# Patient Record
Sex: FEMALE | Race: WHITE | HISPANIC OR LATINO | Employment: FULL TIME | URBAN - METROPOLITAN AREA
[De-identification: names, ages, dates, MRNs, and addresses within clinical notes are randomized per-mention and may not be internally consistent; named-entity substitution may affect disease eponyms.]

---

## 2017-01-03 ENCOUNTER — ALLSCRIPTS OFFICE VISIT (OUTPATIENT)
Dept: OTHER | Facility: OTHER | Age: 42
End: 2017-01-03

## 2017-01-03 ENCOUNTER — LAB REQUISITION (OUTPATIENT)
Dept: LAB | Facility: HOSPITAL | Age: 42
End: 2017-01-03
Payer: COMMERCIAL

## 2017-01-03 DIAGNOSIS — Z34.93 ENCOUNTER FOR SUPERVISION OF NORMAL PREGNANCY IN THIRD TRIMESTER: ICD-10-CM

## 2017-01-03 DIAGNOSIS — O09.523 SUPERVISION OF ELDERLY MULTIGRAVIDA IN THIRD TRIMESTER: ICD-10-CM

## 2017-01-03 LAB
GLUCOSE (HISTORICAL): NEGATIVE
HGB UR QL STRIP.AUTO: NORMAL
LEUKOCYTE ESTERASE UR QL STRIP: NORMAL
PROT UR STRIP-MCNC: NEGATIVE MG/DL

## 2017-01-03 PROCEDURE — 87653 STREP B DNA AMP PROBE: CPT | Performed by: OBSTETRICS & GYNECOLOGY

## 2017-01-05 LAB — GP B STREP DNA SPEC QL NAA+PROBE: ABNORMAL

## 2017-01-11 ENCOUNTER — ALLSCRIPTS OFFICE VISIT (OUTPATIENT)
Dept: PERINATAL CARE | Facility: CLINIC | Age: 42
End: 2017-01-11
Payer: COMMERCIAL

## 2017-01-11 ENCOUNTER — GENERIC CONVERSION - ENCOUNTER (OUTPATIENT)
Dept: OTHER | Facility: OTHER | Age: 42
End: 2017-01-11

## 2017-01-11 ENCOUNTER — ALLSCRIPTS OFFICE VISIT (OUTPATIENT)
Dept: OTHER | Facility: OTHER | Age: 42
End: 2017-01-11

## 2017-01-11 LAB
GLUCOSE (HISTORICAL): NORMAL
HGB UR QL STRIP.AUTO: NORMAL
KETONES UR STRIP-MCNC: NORMAL MG/DL
LEUKOCYTE ESTERASE UR QL STRIP: NORMAL
NITRITE UR QL STRIP: NORMAL

## 2017-01-11 PROCEDURE — 59025 FETAL NON-STRESS TEST: CPT | Performed by: OBSTETRICS & GYNECOLOGY

## 2017-01-11 PROCEDURE — 76815 OB US LIMITED FETUS(S): CPT | Performed by: OBSTETRICS & GYNECOLOGY

## 2017-01-18 ENCOUNTER — ALLSCRIPTS OFFICE VISIT (OUTPATIENT)
Dept: OTHER | Facility: OTHER | Age: 42
End: 2017-01-18

## 2017-01-18 ENCOUNTER — ALLSCRIPTS OFFICE VISIT (OUTPATIENT)
Dept: PERINATAL CARE | Facility: CLINIC | Age: 42
End: 2017-01-18
Payer: COMMERCIAL

## 2017-01-18 ENCOUNTER — GENERIC CONVERSION - ENCOUNTER (OUTPATIENT)
Dept: OTHER | Facility: OTHER | Age: 42
End: 2017-01-18

## 2017-01-18 LAB
GLUCOSE (HISTORICAL): NORMAL
HGB UR QL STRIP.AUTO: NORMAL
LEUKOCYTE ESTERASE UR QL STRIP: NORMAL
NITRITE UR QL STRIP: NORMAL
PROT UR STRIP-MCNC: NORMAL MG/DL

## 2017-01-18 PROCEDURE — 76815 OB US LIMITED FETUS(S): CPT | Performed by: OBSTETRICS & GYNECOLOGY

## 2017-01-18 PROCEDURE — 59025 FETAL NON-STRESS TEST: CPT | Performed by: OBSTETRICS & GYNECOLOGY

## 2017-01-25 ENCOUNTER — ALLSCRIPTS OFFICE VISIT (OUTPATIENT)
Dept: PERINATAL CARE | Facility: CLINIC | Age: 42
End: 2017-01-25
Payer: COMMERCIAL

## 2017-01-25 ENCOUNTER — GENERIC CONVERSION - ENCOUNTER (OUTPATIENT)
Dept: OTHER | Facility: OTHER | Age: 42
End: 2017-01-25

## 2017-01-25 PROCEDURE — 76820 UMBILICAL ARTERY ECHO: CPT | Performed by: OBSTETRICS & GYNECOLOGY

## 2017-01-25 PROCEDURE — 76818 FETAL BIOPHYS PROFILE W/NST: CPT | Performed by: OBSTETRICS & GYNECOLOGY

## 2017-01-25 PROCEDURE — 76821 MIDDLE CEREBRAL ARTERY ECHO: CPT | Performed by: OBSTETRICS & GYNECOLOGY

## 2017-02-01 ENCOUNTER — GENERIC CONVERSION - ENCOUNTER (OUTPATIENT)
Dept: OTHER | Facility: OTHER | Age: 42
End: 2017-02-01

## 2017-02-01 ENCOUNTER — ALLSCRIPTS OFFICE VISIT (OUTPATIENT)
Dept: PERINATAL CARE | Facility: CLINIC | Age: 42
End: 2017-02-01
Payer: COMMERCIAL

## 2017-02-01 ENCOUNTER — APPOINTMENT (OUTPATIENT)
Dept: PERINATAL CARE | Facility: CLINIC | Age: 42
End: 2017-02-01
Payer: COMMERCIAL

## 2017-02-01 ENCOUNTER — ALLSCRIPTS OFFICE VISIT (OUTPATIENT)
Dept: OTHER | Facility: OTHER | Age: 42
End: 2017-02-01

## 2017-02-01 PROCEDURE — 76815 OB US LIMITED FETUS(S): CPT | Performed by: OBSTETRICS & GYNECOLOGY

## 2017-02-01 PROCEDURE — 59025 FETAL NON-STRESS TEST: CPT | Performed by: OBSTETRICS & GYNECOLOGY

## 2017-02-03 ENCOUNTER — HOSPITAL ENCOUNTER (OUTPATIENT)
Facility: HOSPITAL | Age: 42
Discharge: HOME | End: 2017-02-03
Attending: OBSTETRICS & GYNECOLOGY | Admitting: OBSTETRICS & GYNECOLOGY
Payer: COMMERCIAL

## 2017-02-03 VITALS
OXYGEN SATURATION: 98 % | TEMPERATURE: 98.3 F | BODY MASS INDEX: 34.85 KG/M2 | SYSTOLIC BLOOD PRESSURE: 120 MMHG | WEIGHT: 166 LBS | RESPIRATION RATE: 20 BRPM | HEIGHT: 58 IN | DIASTOLIC BLOOD PRESSURE: 83 MMHG | HEART RATE: 101 BPM

## 2017-02-03 PROCEDURE — 99214 OFFICE O/P EST MOD 30 MIN: CPT

## 2017-02-03 PROCEDURE — 76815 OB US LIMITED FETUS(S): CPT | Performed by: OBSTETRICS & GYNECOLOGY

## 2017-02-03 PROCEDURE — 59025 FETAL NON-STRESS TEST: CPT | Performed by: OBSTETRICS & GYNECOLOGY

## 2017-02-03 RX ORDER — ACETAMINOPHEN 500 MG
1000 TABLET ORAL EVERY 6 HOURS PRN
COMMUNITY
End: 2017-02-06 | Stop reason: HOSPADM

## 2017-02-04 ENCOUNTER — ANESTHESIA EVENT (INPATIENT)
Dept: LABOR AND DELIVERY | Facility: HOSPITAL | Age: 42
End: 2017-02-04
Payer: COMMERCIAL

## 2017-02-04 ENCOUNTER — ANESTHESIA (INPATIENT)
Dept: LABOR AND DELIVERY | Facility: HOSPITAL | Age: 42
End: 2017-02-04
Payer: COMMERCIAL

## 2017-02-04 ENCOUNTER — HOSPITAL ENCOUNTER (INPATIENT)
Facility: HOSPITAL | Age: 42
LOS: 2 days | Discharge: HOME | End: 2017-02-06
Attending: OBSTETRICS & GYNECOLOGY | Admitting: OBSTETRICS & GYNECOLOGY
Payer: COMMERCIAL

## 2017-02-04 DIAGNOSIS — Z3A.40 40 WEEKS GESTATION OF PREGNANCY: Primary | ICD-10-CM

## 2017-02-04 LAB
ABO GROUP BLD: NORMAL
BASE EXCESS BLDCOA CALC-SCNC: -5.5 MMOL/L (ref 3–11)
BASE EXCESS BLDCOV CALC-SCNC: -4.3 MMOL/L (ref 1–9)
BASOPHILS # BLD AUTO: 0.02 THOUSANDS/ΜL (ref 0–0.1)
BASOPHILS NFR BLD AUTO: 0 % (ref 0–1)
BLD GP AB SCN SERPL QL: NEGATIVE
EOSINOPHIL # BLD AUTO: 0.06 THOUSAND/ΜL (ref 0–0.61)
EOSINOPHIL NFR BLD AUTO: 1 % (ref 0–6)
ERYTHROCYTE [DISTWIDTH] IN BLOOD BY AUTOMATED COUNT: 14.7 % (ref 11.6–15.1)
HCO3 BLDCOA-SCNC: 20.9 MMOL/L (ref 17.3–27.3)
HCO3 BLDCOV-SCNC: 20 MMOL/L (ref 12.2–28.6)
HCT VFR BLD AUTO: 37.5 % (ref 34.8–46.1)
HGB BLD-MCNC: 12.8 G/DL (ref 11.5–15.4)
LYMPHOCYTES # BLD AUTO: 2.42 THOUSANDS/ΜL (ref 0.6–4.47)
LYMPHOCYTES NFR BLD AUTO: 21 % (ref 14–44)
MCH RBC QN AUTO: 30 PG (ref 26.8–34.3)
MCHC RBC AUTO-ENTMCNC: 34.1 G/DL (ref 31.4–37.4)
MCV RBC AUTO: 88 FL (ref 82–98)
MONOCYTES # BLD AUTO: 0.68 THOUSAND/ΜL (ref 0.17–1.22)
MONOCYTES NFR BLD AUTO: 6 % (ref 4–12)
NEUTROPHILS # BLD AUTO: 8.09 THOUSANDS/ΜL (ref 1.85–7.62)
NEUTS SEG NFR BLD AUTO: 72 % (ref 43–75)
NRBC BLD AUTO-RTO: 0 /100 WBCS
O2 CT VFR BLDCOA CALC: 9.7 ML/DL
OXYHGB MFR BLDCOA: 41.4 %
OXYHGB MFR BLDCOV: 72 %
PCO2 BLDCOA: 43.6 MM[HG] (ref 30–60)
PCO2 BLDCOV: 35.3 MM HG (ref 27–43)
PH BLDCOA: 7.3 [PH] (ref 7.23–7.43)
PH BLDCOV: 7.37 [PH] (ref 7.19–7.49)
PLATELET # BLD AUTO: 184 THOUSANDS/UL (ref 149–390)
PMV BLD AUTO: 12 FL (ref 8.9–12.7)
PO2 BLDCOA: 19.5 MM HG (ref 5–25)
PO2 BLDCOV: 30.2 MM HG (ref 15–45)
RBC # BLD AUTO: 4.27 MILLION/UL (ref 3.81–5.12)
RH BLD: POSITIVE
SAO2 % BLDCOV: 16.6 ML/DL
WBC # BLD AUTO: 11.31 THOUSAND/UL (ref 4.31–10.16)

## 2017-02-04 PROCEDURE — 86900 BLOOD TYPING SEROLOGIC ABO: CPT | Performed by: OBSTETRICS & GYNECOLOGY

## 2017-02-04 PROCEDURE — 86901 BLOOD TYPING SEROLOGIC RH(D): CPT | Performed by: OBSTETRICS & GYNECOLOGY

## 2017-02-04 PROCEDURE — 99213 OFFICE O/P EST LOW 20 MIN: CPT

## 2017-02-04 PROCEDURE — 86592 SYPHILIS TEST NON-TREP QUAL: CPT | Performed by: OBSTETRICS & GYNECOLOGY

## 2017-02-04 PROCEDURE — 86850 RBC ANTIBODY SCREEN: CPT | Performed by: OBSTETRICS & GYNECOLOGY

## 2017-02-04 PROCEDURE — 85025 COMPLETE CBC W/AUTO DIFF WBC: CPT | Performed by: OBSTETRICS & GYNECOLOGY

## 2017-02-04 PROCEDURE — 82805 BLOOD GASES W/O2 SATURATION: CPT | Performed by: OBSTETRICS & GYNECOLOGY

## 2017-02-04 RX ORDER — BUTORPHANOL TARTRATE 1 MG/ML
INJECTION, SOLUTION INTRAMUSCULAR; INTRAVENOUS
Status: COMPLETED
Start: 2017-02-04 | End: 2017-02-04

## 2017-02-04 RX ORDER — OXYTOCIN/RINGER'S LACTATE 30/500 ML
1-30 PLASTIC BAG, INJECTION (ML) INTRAVENOUS
Status: DISCONTINUED | OUTPATIENT
Start: 2017-02-04 | End: 2017-02-05

## 2017-02-04 RX ORDER — LIDOCAINE HYDROCHLORIDE AND EPINEPHRINE 15; 5 MG/ML; UG/ML
INJECTION, SOLUTION EPIDURAL AS NEEDED
Status: DISCONTINUED | OUTPATIENT
Start: 2017-02-04 | End: 2017-02-05 | Stop reason: SURG

## 2017-02-04 RX ORDER — SODIUM CHLORIDE, SODIUM LACTATE, POTASSIUM CHLORIDE, CALCIUM CHLORIDE 600; 310; 30; 20 MG/100ML; MG/100ML; MG/100ML; MG/100ML
125 INJECTION, SOLUTION INTRAVENOUS CONTINUOUS
Status: DISCONTINUED | OUTPATIENT
Start: 2017-02-04 | End: 2017-02-05

## 2017-02-04 RX ORDER — LIDOCAINE HYDROCHLORIDE 10 MG/ML
INJECTION, SOLUTION INFILTRATION; PERINEURAL AS NEEDED
Status: DISCONTINUED | OUTPATIENT
Start: 2017-02-04 | End: 2017-02-05 | Stop reason: SURG

## 2017-02-04 RX ORDER — ROPIVACAINE HYDROCHLORIDE 2 MG/ML
INJECTION, SOLUTION EPIDURAL; INFILTRATION; PERINEURAL AS NEEDED
Status: DISCONTINUED | OUTPATIENT
Start: 2017-02-04 | End: 2017-02-05 | Stop reason: SURG

## 2017-02-04 RX ORDER — BUTORPHANOL TARTRATE 1 MG/ML
1 INJECTION, SOLUTION INTRAMUSCULAR; INTRAVENOUS ONCE
Status: COMPLETED | OUTPATIENT
Start: 2017-02-04 | End: 2017-02-04

## 2017-02-04 RX ADMIN — SODIUM CHLORIDE 5 MILLION UNITS: 0.9 INJECTION, SOLUTION INTRAVENOUS at 17:35

## 2017-02-04 RX ADMIN — BUTORPHANOL TARTRATE 1 MG: 1 INJECTION, SOLUTION INTRAMUSCULAR; INTRAVENOUS at 18:03

## 2017-02-04 RX ADMIN — ROPIVACAINE HYDROCHLORIDE 4 ML: 2 INJECTION, SOLUTION EPIDURAL; INFILTRATION at 18:59

## 2017-02-04 RX ADMIN — Medication 8 ML/HR: at 19:00

## 2017-02-04 RX ADMIN — SODIUM CHLORIDE, SODIUM LACTATE, POTASSIUM CHLORIDE, AND CALCIUM CHLORIDE 125 ML/HR: .6; .31; .03; .02 INJECTION, SOLUTION INTRAVENOUS at 17:35

## 2017-02-04 RX ADMIN — Medication 2 MILLI-UNITS/MIN: at 20:28

## 2017-02-04 RX ADMIN — LIDOCAINE HYDROCHLORIDE AND EPINEPHRINE 3 ML: 15; 5 INJECTION, SOLUTION EPIDURAL at 18:53

## 2017-02-04 RX ADMIN — SODIUM CHLORIDE 2.5 MILLION UNITS: 9 INJECTION, SOLUTION INTRAVENOUS at 22:22

## 2017-02-04 RX ADMIN — LIDOCAINE HYDROCHLORIDE 3 ML: 10 INJECTION, SOLUTION INFILTRATION; PERINEURAL at 18:45

## 2017-02-04 RX ADMIN — SODIUM CHLORIDE, SODIUM LACTATE, POTASSIUM CHLORIDE, AND CALCIUM CHLORIDE 125 ML/HR: .6; .31; .03; .02 INJECTION, SOLUTION INTRAVENOUS at 18:42

## 2017-02-04 RX ADMIN — SODIUM CHLORIDE, SODIUM LACTATE, POTASSIUM CHLORIDE, AND CALCIUM CHLORIDE 125 ML/HR: .6; .31; .03; .02 INJECTION, SOLUTION INTRAVENOUS at 19:30

## 2017-02-04 RX ADMIN — ROPIVACAINE HYDROCHLORIDE 4 ML: 2 INJECTION, SOLUTION EPIDURAL; INFILTRATION at 18:55

## 2017-02-05 LAB
BASOPHILS # BLD AUTO: 0.03 THOUSANDS/ΜL (ref 0–0.1)
BASOPHILS NFR BLD AUTO: 0 % (ref 0–1)
EOSINOPHIL # BLD AUTO: 0.01 THOUSAND/ΜL (ref 0–0.61)
EOSINOPHIL NFR BLD AUTO: 0 % (ref 0–6)
ERYTHROCYTE [DISTWIDTH] IN BLOOD BY AUTOMATED COUNT: 14.8 % (ref 11.6–15.1)
HCT VFR BLD AUTO: 34.5 % (ref 34.8–46.1)
HGB BLD-MCNC: 11.6 G/DL (ref 11.5–15.4)
LYMPHOCYTES # BLD AUTO: 2.02 THOUSANDS/ΜL (ref 0.6–4.47)
LYMPHOCYTES NFR BLD AUTO: 11 % (ref 14–44)
MCH RBC QN AUTO: 29.7 PG (ref 26.8–34.3)
MCHC RBC AUTO-ENTMCNC: 33.6 G/DL (ref 31.4–37.4)
MCV RBC AUTO: 88 FL (ref 82–98)
MONOCYTES # BLD AUTO: 1.41 THOUSAND/ΜL (ref 0.17–1.22)
MONOCYTES NFR BLD AUTO: 8 % (ref 4–12)
NEUTROPHILS # BLD AUTO: 14.56 THOUSANDS/ΜL (ref 1.85–7.62)
NEUTS SEG NFR BLD AUTO: 81 % (ref 43–75)
NRBC BLD AUTO-RTO: 0 /100 WBCS
PLATELET # BLD AUTO: 142 THOUSANDS/UL (ref 149–390)
PMV BLD AUTO: 11.6 FL (ref 8.9–12.7)
RBC # BLD AUTO: 3.91 MILLION/UL (ref 3.81–5.12)
WBC # BLD AUTO: 18.09 THOUSAND/UL (ref 4.31–10.16)

## 2017-02-05 PROCEDURE — 85025 COMPLETE CBC W/AUTO DIFF WBC: CPT | Performed by: OBSTETRICS & GYNECOLOGY

## 2017-02-05 PROCEDURE — 0HQ9XZZ REPAIR PERINEUM SKIN, EXTERNAL APPROACH: ICD-10-PCS | Performed by: OBSTETRICS & GYNECOLOGY

## 2017-02-05 PROCEDURE — 4A0HXCZ MEASUREMENT OF PRODUCTS OF CONCEPTION, CARDIAC RATE, EXTERNAL APPROACH: ICD-10-PCS | Performed by: OBSTETRICS & GYNECOLOGY

## 2017-02-05 RX ORDER — IBUPROFEN 600 MG/1
600 TABLET ORAL EVERY 6 HOURS PRN
Status: DISCONTINUED | OUTPATIENT
Start: 2017-02-05 | End: 2017-02-06 | Stop reason: HOSPADM

## 2017-02-05 RX ORDER — ACETAMINOPHEN 325 MG/1
650 TABLET ORAL EVERY 6 HOURS PRN
Status: DISCONTINUED | OUTPATIENT
Start: 2017-02-05 | End: 2017-02-06 | Stop reason: HOSPADM

## 2017-02-05 RX ORDER — DOCUSATE SODIUM 100 MG/1
100 CAPSULE, LIQUID FILLED ORAL 2 TIMES DAILY
Status: DISCONTINUED | OUTPATIENT
Start: 2017-02-05 | End: 2017-02-06 | Stop reason: HOSPADM

## 2017-02-05 RX ORDER — OXYCODONE HYDROCHLORIDE AND ACETAMINOPHEN 5; 325 MG/1; MG/1
1 TABLET ORAL EVERY 4 HOURS PRN
Status: DISCONTINUED | OUTPATIENT
Start: 2017-02-05 | End: 2017-02-06 | Stop reason: HOSPADM

## 2017-02-05 RX ORDER — OXYCODONE HYDROCHLORIDE AND ACETAMINOPHEN 5; 325 MG/1; MG/1
2 TABLET ORAL EVERY 4 HOURS PRN
Status: DISCONTINUED | OUTPATIENT
Start: 2017-02-05 | End: 2017-02-06 | Stop reason: HOSPADM

## 2017-02-05 RX ORDER — DIAPER,BRIEF,INFANT-TODD,DISP
1 EACH MISCELLANEOUS AS NEEDED
Status: DISCONTINUED | OUTPATIENT
Start: 2017-02-05 | End: 2017-02-06 | Stop reason: HOSPADM

## 2017-02-05 RX ORDER — SENNOSIDES 8.6 MG
1 TABLET ORAL
Status: DISCONTINUED | OUTPATIENT
Start: 2017-02-05 | End: 2017-02-06 | Stop reason: HOSPADM

## 2017-02-05 RX ORDER — MEDROXYPROGESTERONE ACETATE 150 MG/ML
150 INJECTION, SUSPENSION INTRAMUSCULAR ONCE
Status: COMPLETED | OUTPATIENT
Start: 2017-02-05 | End: 2017-02-05

## 2017-02-05 RX ADMIN — DOCUSATE SODIUM 100 MG: 100 CAPSULE, LIQUID FILLED ORAL at 17:11

## 2017-02-05 RX ADMIN — OXYCODONE HYDROCHLORIDE AND ACETAMINOPHEN 2 TABLET: 5; 325 TABLET ORAL at 01:22

## 2017-02-05 RX ADMIN — IBUPROFEN 600 MG: 600 TABLET ORAL at 07:57

## 2017-02-05 RX ADMIN — IBUPROFEN 600 MG: 600 TABLET ORAL at 14:12

## 2017-02-05 RX ADMIN — MEDROXYPROGESTERONE ACETATE 150 MG: 150 INJECTION, SUSPENSION INTRAMUSCULAR at 17:10

## 2017-02-05 RX ADMIN — DOCUSATE SODIUM 100 MG: 100 CAPSULE, LIQUID FILLED ORAL at 07:59

## 2017-02-06 VITALS
RESPIRATION RATE: 18 BRPM | WEIGHT: 166 LBS | HEART RATE: 82 BPM | TEMPERATURE: 98.1 F | BODY MASS INDEX: 38.42 KG/M2 | SYSTOLIC BLOOD PRESSURE: 117 MMHG | OXYGEN SATURATION: 98 % | HEIGHT: 55 IN | DIASTOLIC BLOOD PRESSURE: 68 MMHG

## 2017-02-06 LAB — RPR SER QL: NORMAL

## 2017-02-06 RX ORDER — ACETAMINOPHEN 325 MG/1
650 TABLET ORAL EVERY 6 HOURS PRN
Qty: 30 TABLET | Refills: 0 | Status: SHIPPED | OUTPATIENT
Start: 2017-02-06 | End: 2017-02-16

## 2017-02-06 RX ORDER — IBUPROFEN 600 MG/1
600 TABLET ORAL EVERY 6 HOURS PRN
Qty: 30 TABLET | Refills: 0 | Status: SHIPPED | OUTPATIENT
Start: 2017-02-06 | End: 2018-06-12

## 2017-02-06 RX ORDER — DOCUSATE SODIUM 100 MG/1
100 CAPSULE, LIQUID FILLED ORAL 2 TIMES DAILY
Qty: 60 CAPSULE | Refills: 0 | Status: SHIPPED | OUTPATIENT
Start: 2017-02-06 | End: 2018-06-12

## 2017-02-06 RX ADMIN — DOCUSATE SODIUM 100 MG: 100 CAPSULE, LIQUID FILLED ORAL at 07:47

## 2017-02-06 RX ADMIN — IBUPROFEN 600 MG: 600 TABLET ORAL at 07:47

## 2017-02-08 ENCOUNTER — TELEPHONE (OUTPATIENT)
Dept: LABOR AND DELIVERY | Facility: HOSPITAL | Age: 42
End: 2017-02-08

## 2017-02-13 LAB — PLACENTA IN STORAGE: NORMAL

## 2017-02-20 ENCOUNTER — GENERIC CONVERSION - ENCOUNTER (OUTPATIENT)
Dept: OTHER | Facility: OTHER | Age: 42
End: 2017-02-20

## 2017-03-02 ENCOUNTER — ALLSCRIPTS OFFICE VISIT (OUTPATIENT)
Dept: OTHER | Facility: OTHER | Age: 42
End: 2017-03-02

## 2017-03-20 ENCOUNTER — GENERIC CONVERSION - ENCOUNTER (OUTPATIENT)
Dept: OTHER | Facility: OTHER | Age: 42
End: 2017-03-20

## 2017-04-24 ENCOUNTER — ALLSCRIPTS OFFICE VISIT (OUTPATIENT)
Dept: OTHER | Facility: OTHER | Age: 42
End: 2017-04-24

## 2017-04-24 LAB — HCG, QUALITATIVE (HISTORICAL): NEGATIVE

## 2017-07-17 ENCOUNTER — ALLSCRIPTS OFFICE VISIT (OUTPATIENT)
Dept: OTHER | Facility: OTHER | Age: 42
End: 2017-07-17

## 2017-07-24 ENCOUNTER — ALLSCRIPTS OFFICE VISIT (OUTPATIENT)
Dept: OTHER | Facility: OTHER | Age: 42
End: 2017-07-24

## 2017-08-24 ENCOUNTER — GENERIC CONVERSION - ENCOUNTER (OUTPATIENT)
Dept: OTHER | Facility: OTHER | Age: 42
End: 2017-08-24

## 2017-09-06 ENCOUNTER — ALLSCRIPTS OFFICE VISIT (OUTPATIENT)
Dept: OTHER | Facility: OTHER | Age: 42
End: 2017-09-06

## 2017-09-06 DIAGNOSIS — Z12.31 ENCOUNTER FOR SCREENING MAMMOGRAM FOR MALIGNANT NEOPLASM OF BREAST: ICD-10-CM

## 2018-01-09 NOTE — RESULT NOTES
Verified Results  (1) PLACENTA IN STORAGE [id number] 01:10AM Lani Whelan     Test Name Result Flag Reference   PLACENTA IN STORAGE Placenta Discarded

## 2018-01-10 NOTE — MISCELLANEOUS
Message  Received call from Tamiko Hines that patient's specimen is expected to result in the next 24-48 hours  11 days today        Signatures   Electronically signed by : Nii Jose, ; Sep 12 2016 11:18AM EST                       (Author)

## 2018-01-12 VITALS
WEIGHT: 151 LBS | BODY MASS INDEX: 29.64 KG/M2 | SYSTOLIC BLOOD PRESSURE: 98 MMHG | DIASTOLIC BLOOD PRESSURE: 66 MMHG | HEIGHT: 60 IN

## 2018-01-13 VITALS
HEIGHT: 60 IN | WEIGHT: 165.03 LBS | SYSTOLIC BLOOD PRESSURE: 120 MMHG | BODY MASS INDEX: 32.4 KG/M2 | DIASTOLIC BLOOD PRESSURE: 65 MMHG

## 2018-01-14 VITALS
BODY MASS INDEX: 30.04 KG/M2 | DIASTOLIC BLOOD PRESSURE: 66 MMHG | HEIGHT: 60 IN | WEIGHT: 153 LBS | SYSTOLIC BLOOD PRESSURE: 104 MMHG | HEART RATE: 79 BPM

## 2018-01-14 VITALS
SYSTOLIC BLOOD PRESSURE: 106 MMHG | BODY MASS INDEX: 29.64 KG/M2 | HEIGHT: 60 IN | WEIGHT: 151 LBS | DIASTOLIC BLOOD PRESSURE: 76 MMHG

## 2018-01-14 VITALS
SYSTOLIC BLOOD PRESSURE: 131 MMHG | HEIGHT: 60 IN | BODY MASS INDEX: 32.63 KG/M2 | WEIGHT: 166.2 LBS | DIASTOLIC BLOOD PRESSURE: 68 MMHG

## 2018-01-14 VITALS
BODY MASS INDEX: 29.72 KG/M2 | DIASTOLIC BLOOD PRESSURE: 72 MMHG | WEIGHT: 151.4 LBS | HEIGHT: 60 IN | SYSTOLIC BLOOD PRESSURE: 118 MMHG

## 2018-01-14 VITALS
SYSTOLIC BLOOD PRESSURE: 112 MMHG | HEIGHT: 60 IN | WEIGHT: 162 LBS | HEART RATE: 102 BPM | DIASTOLIC BLOOD PRESSURE: 65 MMHG | BODY MASS INDEX: 31.8 KG/M2

## 2018-01-14 VITALS
BODY MASS INDEX: 32.39 KG/M2 | DIASTOLIC BLOOD PRESSURE: 64 MMHG | HEIGHT: 60 IN | WEIGHT: 165 LBS | SYSTOLIC BLOOD PRESSURE: 116 MMHG

## 2018-01-14 VITALS
BODY MASS INDEX: 32.39 KG/M2 | DIASTOLIC BLOOD PRESSURE: 68 MMHG | HEIGHT: 60 IN | WEIGHT: 165 LBS | SYSTOLIC BLOOD PRESSURE: 125 MMHG

## 2018-01-14 VITALS
DIASTOLIC BLOOD PRESSURE: 50 MMHG | WEIGHT: 164.2 LBS | SYSTOLIC BLOOD PRESSURE: 115 MMHG | BODY MASS INDEX: 32.24 KG/M2 | HEIGHT: 60 IN

## 2018-01-14 NOTE — MISCELLANEOUS
Message  Return to work or school:   Duarte Izaguirre is under my professional care   She was seen in my office on 08/24/2017   She is able to return to work on  08/25/2017            Signatures   Electronically signed by : Katy Woodard RN; Aug 24 2017  7:49AM EST                       (Author)

## 2018-01-15 NOTE — PROGRESS NOTES
SEP 20 2016         RE: Francesco Coello                               To: [facility]   MR#: [id number]                                   [address] [place] [address]   : Suzy Mary [address], [address]   ENC: [id number]                             Fax: [phone]   (Exam #: [id number])      The LMP of this 37year old,  G5, P2-0-2-2 patient was MAY 15 2016, her   working BRYCE is 2017 and the current gestational age is 25 weeks 3   days by 1st Trimester Sono  A sonographic examination was performed on SEP   20 2016 using real time equipment  The ultrasound examination was   performed using abdominal & vaginal techniques  The patient has a BMI of   31 0  Her blood pressure today was 111/56        Earliest ultrasound found in her record: 16 12w5d  17 BRYCE      Cardiac motion was observed at 151 bpm       INDICATIONS      fetal anatomical survey   obesity   advanced maternal age   family h/o diabetes      Exam Types      Transvaginal   LEVEL II      RESULTS      Fetus # 1 of 1   Vertex presentation   Fetal growth appeared normal   Placenta Location = Posterior, fundal   No placenta previa   Placenta Grade = II      MEASUREMENTS (* Included In Average GA)      AC              14 7 cm        19 weeks 5 days* (36%)   BPD              4 5 cm        19 weeks 5 days* (33%)   HC              17 1 cm        19 weeks 4 days* (26%)   Femur            3 1 cm        19 weeks 6 days* (31%)      Nuchal Fold      4 6 mm      Humerus          3 2 cm        20 weeks 5 days  (57%)   Radius           2 6 cm        20 weeks 4 days   Ulna             3 1 cm        21 weeks 4 days   Tibia            2 9 cm        20 weeks 4 days  (47%)   Fibula           2 8 cm        19 weeks 2 days   Foot             3 5 cm        21 weeks 0 days      Cerebellum       2 2 cm        21 weeks 1 day   Biorbit          3 0 cm        19 weeks 4 days   CisternaMagna    4 7 mm HC/AC           1 17   FL/AC           0 21   FL/BPD          0 69   EFW (Ac/Fl/Hc)   313 grams - 0 lbs 11 oz      THE AVERAGE GESTATIONAL AGE is 19 weeks 5 days +/- 10 days  AMNIOTIC FLUID         Largest Vertical Pocket = 4 6 cm   Amniotic Fluid: Normal      UTERINE ARTERIES                                  S/D   PI    RI    NOTCH       Left Uterine Artery        2 02  0 74  0 50       Right Uterine Artery       2 36  0 94  0 58      CERVICAL EVALUATION      SUPINE      Cervical Length: 4 10 cm      OTHER TEST RESULTS           Funneling?: No             Dynamic Changes?: No        Resp  To TFP?: No      ANATOMY      Head                                    Normal   Face/Neck                               Normal   Th  Cav  Normal   Heart                                   Normal   Abd  Cav  Normal   Stomach                                 Normal   Right Kidney                            Normal   Left Kidney                             Normal   Bladder                                 Normal   Abd  Wall                               Normal   Spine                                   Normal   Extrems                                 Normal   Genitalia                               Normal   Placenta                                Normal   Umbl  Cord                              Normal   Uterus                                  Normal   PCI                                     Normal      ANATOMY DETAILS      Visualized Appearing Sonographically Normal:   HEAD: (Calvarium, BPD Level, Cavum, Lateral Ventricles, Choroid Plexus,   Cerebellum, Cisterna Magna);    FACE/NECK: (Neck, Nuchal Fold, Profile,   Orbits, Nose/Lips, Palate, Face);    TH  CAV : (Diaphragm);     HEART:   (Four Chamber View, Proximal Left Outflow, Proximal Right Outflow, 3   Vessel Trachea, Short Axis of Greater Vessels, Ductal Arch, Aortic Arch,   Interventricular Septum, Interatrial Septum, IVC, SVC, Cardiac Axis,   Cardiac Position);    ABD  CAV , STOMACH, RIGHT KIDNEY, LEFT KIDNEY,   BLADDER, ABD  WALL, SPINE: (Cervical Spine, Thoracic Spine, Lumbar Spine,   Sacrum);    EXTREMS: (Lt Humerus, Rt Humerus, Lt Forearm, Rt Forearm, Lt   Hand, Rt Hand, Lt Femur, Rt Femur, Lt Low Leg, Rt Low Leg, Lt Foot, Rt   Foot);    GENITALIA, PLACENTA, UMBL  CORD, UTERUS, PCI      ADNEXA      The left ovary appeared normal and measured 3 1 x 2 5 x 1 0 cm with a   volume of 4 1 cc  The right ovary appeared normal and measured 2 7 x 2 6 x   1 5 cm with a volume of 5 5 cc  IMPRESSION      Collins IUP   19 weeks and 5 days by this ultrasound  (BRYCE=2017)   20 weeks and 3 days by 1st Tri Sono  (BRYCE=2017)   Vertex presentation   Fetal growth appeared normal   Normal anatomy survey   Regular fetal heart rate of 151 bpm   Posterior, fundal placenta   No placenta previa      CONSULT COMMENT      OFFICE VISIT      Thank you very much for referring this very nice patient for a    evaluation and fetal anatomic survey  This is the patient's fifth   pregnancy  She had a history of 2 previous full-term vaginal deliveries   without couple occasions in  and again in   She has had 2 first   trimester miscarriages  Other than class I obesity and occasional   migraines, she has no other significant medical or surgical history  She   denies the current use of tobacco, alcohol, or drugs  Her medications   include prenatal vitamins and Tylenol when necessary  She has no   significant drug allergies  Her family medical history is   noncontributory  A review of systems is otherwise negative  On exam today   the patient appears well, in no acute distress, and denies any complaints  Her abdomen is non-tender  The patient had noninvasive prenatal testing through [place] [facility] test   Her results were normal, placing her in a   very low risk category    The sensitivity of detecting Trisomy 21 with this   test is 99 1% with a specificity of 99 9%  The sensitivity of detecting   Trisomy 18 is >99 9% with a specificity of 99 6%  The sensitivity of   detecting Trisomy 13 is 91 7% with a specificity of 99 7%  Her negative   result is very reassuring to rule out the aforementioned Trisomies  The fetal anatomic survey is complete  There is no sonographic evidence   of fetal abnormalities at this time  Good fetal movement and tone are   seen  The amniotic fluid volume appears normal   The placenta is   posterior fundal and it appears sonographically normal   A transvaginal   ultrasound was performed to assess the cervix, which was not seen well   transabdominally  The cervical length was 4 1 centimeters, which is   normal for the current gestational age  There was no significant   funneling or dynamic changes appreciated  The patient was informed of   today's findings and all of her questions were answered  The limitations   of ultrasound were reviewed with the patient, which she accepts  Recommend the patient return in 8 weeks for a growth ultrasound secondary   to advanced maternal age  We discussed that additional surveillance is   recommended in the third trimester starting at around 36 weeks with weekly   antepartum surveillance for the indication of AMA greater than 40  Please note, in addition to the time spent discussing the results of the   ultrasound, I spent approximately 10 minutes of face-to-face time with the   patient, greater than 50% of which was spent in counseling and the   coordination of care for this patient  Thank you very much for allowing us to participate in the care of this   very nice patient  Should you have any questions, please do not hesitate   to contact our office  JOSIAH Thompson M D     Electronically signed 10/03/16 17:23

## 2018-01-15 NOTE — PROGRESS NOTES
Chief Complaint  Patient seen for genetic counseling to discuss maternal age-related risks for aneuploidy  At the time of our genetic counseling session, Ralph Tan was approximately 17 weeks 4 days pregnant  At the age of 37, there is an estimated 1/62 risk for a fetal chromosome abnormality at term  Approximately half of these abnormalities are due to Down syndrome and the remainder are due to other chromosome abnormalities  I reviewed with the patient the option of amniocentesis for chromosome analysis  Amniocentesis is generally performed at 16 weeks or later and has an associated procedure related risk of miscarriage of less than 1/300  Chromosome results amniocentesis are greater than 99 9% accurate  Occasionally a repeat procedure may be necessary due to cell culture failure  Measurement of AFP from amniotic fluid is able to detect approximately 95% of open neural tube defects  We also reviewed the availability and limitations of second trimester maternal serum screening, NIPS, and level II ultrasound evaluation  Second trimester maternal serum screening is able to detect approximately 80% of pregnancies in which the fetus has Down syndrome, 80% of pregnancies in which the fetus has trisomy 25 and 80% of pregnancies which appears has an open neural tube defect  NIPS involves assessment of fragments of fetal DNA in maternal blood  This test has varying detection rates depending on the laboratory used though the quoted detection rate for Down syndrome is generally greater than 99% and detection rate for trisomy 18 is 98% or better  NIPS has a low false positive rate however consideration of prenatal diagnostic testing is always recommended following a positive NIPS  Level II ultrasound evaluation is able to detect many major physical birth defects and variations in fetal development that may be associated with chromosome abnormalities   Level II ultrasound evaluation is not able to detect all birth defects or health problems  After discussing the available prenatal diagnostic and screening procedures, the patient elected to decline amniocentesis at this time  She did elect to move forward with NIPS  Blood will be drawn following the genetic counseling session and sent to 's labs  The patient was made aware of expected out of pocket expense due to unmet deductible  In addition, she is planning to schedule level II ultrasound evaluation at the appropriate time  Maternal serum AFP screening is recommended if not previously ordered  During her counseling session, histories were taken on the patient's family and the family of her , Jeffrey Pitt, both of which were negative for any prior known cases of intellectual disability, birth defects or single gene disorders  The patient reports that both she and her  are of [place] descent with no known Denominational ancestry  Carrier screening for CF, SMA, Fragile X and hemoglobinopathies was discussed  Jayshree elected to decline genetic carrier screening for CF, SMA and Fragile X  Hemoglobinopathy screening is recommended if not previously performed  Lastly, we discussed the fact that it is important to keep in mind that everyone in the general population regardless of age, family history, or medical background has approximately a 3% risk of having a child with some type of her defect, genetic disease or intellectual disability  Currently there are no tests available to rule out all birth defects or health problems  Active Problems    1  Elderly multigravida in second trimester (659 63) (O09 522)   2  Encounter for routine gynecological examination (V72 31) (Z01 419)   3  Encounter for supervision of other normal pregnancy in first trimester (V22 1) (Z34 81)   4  Multigravida of advanced maternal age in first trimester (65 56) (O200 65)   5  Screen for STD (sexually transmitted disease) (V74 5) (Z11 3)    Past Medical History    1   History of Complete spontaneous  (634 92) (O03 9)   2  History of  (normal spontaneous vaginal delivery) (650) (O80)    Surgical History    1  History of Dental Surgery    Family History    1  Family history of Elevated BP    2  Family history of diabetes mellitus (V18 0) (Z83 3)    Social History    · Never a smoker    Current Meds   1  Prenatal Plus/Iron 27-1 MG TABS; TAKE 1 TABLET DAILY; Therapy: 66IUT6986 to (Evaluate:85Qpj5190)  Requested for: 07FAD9978; Last   Rx:26Lbs0391 Ordered    Allergies    1  No Known Drug Allergies    2   Latex    Vitals  Signs   Recorded: 56XAJ6045 83:03XB   Systolic: 97, LUE, Sitting  Diastolic: 69, LUE, Sitting  Pain Scale: 0  Height: 4 ft 10 in  Weight: 156 lb 9 6 oz  BMI Calculated: 32 73  BSA Calculated: 1 64  BP Cuff Size: Large    Future Appointments    Date/Time Provider Specialty Site   2016 08:00 AM  [address]   2016 04:00 PM JOSIAH Anderson 21 Chief Resident, Schedule  [facility]  Leah Santa 41     Signatures   Electronically signed by : Mag Reno, ; Aug 31 2016  9:51AM EST                       (Author)    Electronically signed by : Mag Reno, ; Aug 31 2016  9:52AM EST                       (Author)

## 2018-01-16 NOTE — MISCELLANEOUS
Reason For Visit  Reason For Visit Free Text Note Form: PN PATIENT SEEN FOR ASSESS  Case Management Documentation [facility]:   Information obtained from the patient, family member(s) and medical record  Patient's financial status employed  Action Plan: information provided  Progress Note  MET WITH 42 Y/O- M- G5:P2:AB2-  BILINGUAL WOMAN  PATIENT RESIDES WITH  AND 2 KIDS  PREGNANCY WAS NOT PLAN BUT WELCOMED  PATIENT DENIES ANY USAGE OF DRUG, ALCOHOL OR SMOKING  NO MENTAL HEALTH HISTORY OR DV ISSUES  PRESENTS COOPERATIVE  DENIES ANY CONCERN AT THIS TIME  WILL FOLLOW UP AS NEEDED  Active Problems    1  Encounter for routine gynecological examination (V72 31) (Z01 419)   2  Encounter for supervision of other normal pregnancy in first trimester (V22 1) (Z34 81)   3  Multigravida of advanced maternal age in first trimester (65 56) (O200 65)   4  Screen for STD (sexually transmitted disease) (V74 5) (Z11 3)    Current Meds   1  Prenatal Plus/Iron 27-1 MG TABS; TAKE 1 TABLET DAILY; Therapy: 45DOX2737 to (Evaluate:48Vvd6002)  Requested for: 55XFL3713; Last   Rx:52Wel3829 Ordered    Allergies    1  No Known Drug Allergies    2   No Known Food Allergies    Signatures   Electronically signed by : SHIVANI Chang; Aug 25 2016  3:56PM EST                       (Author)

## 2018-01-16 NOTE — PROGRESS NOTES
2017         RE: Vishnu Graham                               To: [facility]   MR#: [id number]                                   [address] [place] [address]   : [facility], [address]   ENC: [id number]                             Fax: [phone]   (Exam #: [id number])      The LMP of this 37year old,  G5, P2-0-2-2 patient was MAY 15 2016, her   working BRYCE is 2017 and the current gestational age is 42 weeks 4   days by [address] [place], [place] [address] [address]  A sonographic examination was performed on 2017 using real time equipment  The ultrasound examination was   performed using abdominal technique  The patient has a BMI of 32 2  Her   blood pressure today was 120/65  Earliest ultrasound found in her record: 16 12w5d  17 BRYCE      problem list:   1  Advanced maternal age with normal NIPT      Cardiac motion was observed at 152 bpm       INDICATIONS      obesity   advanced maternal age   family h/o diabetes      Exam Types      Amniotic Fluid Index      RESULTS      Fetus # 1 of 1   Variable presentation   Placenta Location = Anterior   Placenta Grade = II      The NST was reactive with no decelerations  AMNIOTIC FLUID      Q1: 2 3      Q2: 4 2      Q3: 4 4      Q4: 2 6   MIGUEL ANGEL Total = 13 5 cm      IMPRESSION      Collins IUP   36 weeks and 4 days by 1st Tri Sono  (BRYCE=2017)   Variable presentation   Regular fetal heart rate of 152 bpm   Anterior placenta      GENERAL COMMENT      The patient presented today for antepartum fetal surveillance secondary to   advanced maternal age  She had a modified biophysical profile, which   includes an NST and evaluation of the amniotic fluid  The NST was   reactive and reassuring  The amniotic fluid index was 13 5 cm, which is   normal for gestational age  Recommend continued weekly fetal testing secondary to advanced maternal   age        Thank very much for allowing us to participate in the care of your   patient  Should you have any questions, please do not hesitate to contact   our office  JOSIAH Vasquez M D     Electronically signed 01/12/17 08:22

## 2018-01-18 NOTE — PROGRESS NOTES
2017         RE: Isabel Whitney                               To: [facility]   MR#: [id number]                                   [address] [place] [address]   : Kim De La Cruz [address]   ENC: [id number]                             Fax: [phone]   (Exam #: [id number])      The LMP of this 37year old,  G5, P2-0-2-2 patient was MAY 15 2016, her   working BRYCE is 2017 and the current gestational age is 37 weeks 4   days by 1st Trimester Sono  A sonographic examination was performed on 2017 using real time equipment  The ultrasound examination was   performed using abdominal technique  The patient has a BMI of 32 2  Her   blood pressure today was 125/68  Earliest ultrasound found in her record: 16 12w5d  17 BRYCE      problem list:   1  Advanced maternal age with normal NIPT      Cardiac motion was observed at 173 bpm       INDICATIONS      advanced maternal age      Exam Types      Level I   Doppler study   Biophysical Profile      RESULTS      Fetus # 1 of 1   Vertex presentation   Placenta Location = Anterior   Placenta Grade = II      The NST was reactive with variable decelerations  AMNIOTIC FLUID      Q1: 1 9      Q2: 2 6      Q3: 2 2      Q4: 2 3   MIGUEL ANGEL Total = 9 1 cm   Amniotic Fluid: Normal      FETAL VESSELS                                     S/D   PI    RI    PSV   AEDV RF                                                    cm/s       Umbilical Artery           2 42  0 90  0 59       Middle Cerebral Artery     8 97  2 16  0 87      BIOPHYSICAL PROFILE      The Biophysical Profile score was 10/10  Breathin  Movement: 2  Tone: 2  AFV: 2  NST: 2   The NST was reactive with variable decelerations        IMPRESSION      Collins IUP   38 weeks and 4 days by 1st Tri Sono  (BRYCE=2017)   Vertex presentation   Regular fetal heart rate of 173 bpm   Anterior placenta      GENERAL COMMENT Debbi Lagunas is on weekly fetal testing secondary to her age  Her NST today is   reactive with a baseline of 140 and XL's to 180  There was an area of a   deceleration down to 120 which return to baseline over one minute without   interval change and just after returning to baseline the fetus had   hiccups  An ultrasound was done which showed a normal MIGUEL ANGEL today of 9, and   a normal biophysical profile and normal Dopplers  Risk for stillbirth increases based on her age of 37  With the variable   seen on today's nonstress test, backup testing was recommended and was   reassuring  Would recommend delivery any time after 39 weeks  Flywheel SoftwareJOSIAH M D     Maternal-Fetal Medicine   Electronically signed 01/25/17 21:26

## 2018-01-18 NOTE — PROGRESS NOTES
DEC 27 2016         RE: Wilbern Prader                               To: [address]   MR#: [id number]                                   [address] [place] [address]   : [facility],  [address], [address]   ENC:                                              Fax: [phone]   (Exam #: [id number])      The LMP of this 37year old,  G5, P2-0-2-2 patient was MAY 15 2016, her   working BRYCE is 2017 and the current gestational age is 29 weeks 3   days by [address] [place] [address]  A sonographic examination was performed on DEC   27 2016 using real time equipment  The ultrasound examination was   performed using abdominal technique  The patient has a BMI of 31 4  Her   blood pressure today was 114/60  Earliest ultrasound found in her record: 16 12w5d  17 BRYCE      problem list:   1  Advanced maternal age with normal NIPT      Cardiac motion was observed at 120 bpm       INDICATIONS      obesity   advanced maternal age   family h/o diabetes   fetal growth      Exam Types      Level I      RESULTS      Fetus # 1 of 1   Vertex presentation   Fetal growth appeared normal   Placenta Location = Posterior, fundal   No placenta previa   Placenta Grade = II      MEASUREMENTS (* Included In Average GA)      AC              29 3 cm        33 weeks 3 days* (32%)   BPD              8 1 cm        32 weeks 4 days* (13%)   HC              30 6 cm        33 weeks 4 days* (23%)   Femur            6 5 cm        33 weeks 1 day * (33%)      Humerus          5 8 cm        33 weeks 4 days  (45%)      Cerebellum       4 4 cm        35 weeks 1 day      HC/AC           1 04   FL/AC           0 22   FL/BPD          0 80   EFW (Ac/Fl/Hc)  2185 grams - 4 lbs 13 oz                 (28%)      THE AVERAGE GESTATIONAL AGE is 33 weeks 1 day +/- 18 days        AMNIOTIC FLUID      Q1: 4 9      Q2: 3 3      Q3: 5 3      Q4: 2 4   MIGUEL ANGEL Total = 15 8 cm   Amniotic Fluid: Normal      ANATOMY DETAILS      Visualized Appearing Sonographically Normal:   HEAD: (Calvarium, BPD Level, Cavum, Lateral Ventricles, Cerebellum); HEART: (Four Chamber View, Proximal Left Outflow, Proximal Right Outflow,   3VV, Interventricular Septum);    STOMACH, RIGHT KIDNEY, LEFT KIDNEY,   BLADDER, GENITALIA (Male), PLACENTA      BIOPHYSICAL PROFILE      The Biophysical Profile score was 8/8  Breathin  Movement: 2  Tone: 2  AFV: 2      IMPRESSION      Collins IUP   33 weeks and 1 day by this ultrasound  (BRYCE=2017)   34 weeks and 3 days by 1st Tri Sono  (BRYCE=2017)   Vertex presentation   Fetal growth appeared normal   Regular fetal heart rate of 120 bpm   Posterior, fundal placenta   No placenta previa      GENERAL COMMENT      The patient has no complaints today  She reports regular fetal movements   and denies problems related to hypertension, gestational diabetes,    labor, or vaginal bleeding  Her prenatal course has apparently been   unremarkable in the interim since her most recent visit here  Recommend starting weekly nonstress tests/fluid scans from 36 weeks on   secondary to her age of 37 which increases her risk for stillbirth  RECOMMENDATION      Growth Ultrasound: As indicated      JOSIAH Vasquez M D     Maternal-Fetal Medicine   Electronically signed 16 17:21

## 2018-01-18 NOTE — MISCELLANEOUS
Message  Return to work or school:   Annie Russell is under my professional care  She was seen in my office on 03/02/2017   She is able to return to work on  03/21/2017      No restrictions          Signatures   Electronically signed by : Sonia Keith RN; Mar 20 2017 10:17AM EST                       (Author)

## 2018-01-22 VITALS
BODY MASS INDEX: 28.66 KG/M2 | SYSTOLIC BLOOD PRESSURE: 127 MMHG | WEIGHT: 146 LBS | HEIGHT: 60 IN | DIASTOLIC BLOOD PRESSURE: 85 MMHG

## 2018-01-22 VITALS
SYSTOLIC BLOOD PRESSURE: 122 MMHG | HEIGHT: 60 IN | BODY MASS INDEX: 32.79 KG/M2 | DIASTOLIC BLOOD PRESSURE: 70 MMHG | WEIGHT: 167 LBS

## 2018-01-22 VITALS
DIASTOLIC BLOOD PRESSURE: 76 MMHG | WEIGHT: 164 LBS | HEIGHT: 60 IN | SYSTOLIC BLOOD PRESSURE: 119 MMHG | BODY MASS INDEX: 32.2 KG/M2

## 2018-01-22 VITALS
DIASTOLIC BLOOD PRESSURE: 70 MMHG | SYSTOLIC BLOOD PRESSURE: 102 MMHG | BODY MASS INDEX: 32.39 KG/M2 | HEIGHT: 60 IN | WEIGHT: 165 LBS

## 2018-03-07 NOTE — PROGRESS NOTES
Education  Screwpulp Education Post Partum:   Post Partum Education provided:   The patient is breastfeeding  The patient is not planning on exclusively breastfeeding until the baby is 10months of age  The patient is formula feeding  The baby has seen a pediatrician   The pediatrician is in the network Pediatrician name: Michael 79 partum education provided by: Sonja Domingo 3/2/17      Signatures   Electronically signed by : Sonja Domingo, ; Mar  2 2017 11:38AM EST                       (Author)

## 2018-03-07 NOTE — PROGRESS NOTES
Education  [email or web address] Education 3rd Trimester: Third Trimester Education provided: benefits of breastfeeding, importance of exclusive breastfeeding, early initiation of breastfeeding, exclusive breastfeeding for the first 6 months, frequent feedings for optimal milk production, feeding on demand/baby-led feedings, effective positioning and attachment, non-pharmacologic pain relief methods for labor, importance of early skin-to-skin contact and 28 week packet given  rooming-in on 24 hour basis   The patient is planning on breastfeeding  The patient is planning on exclusively breastfeeding until the baby is 10months of age         Signatures   Electronically signed by : Consuelo Yu RN; Nov 29 2016  3:15PM EST                       (Author)

## 2018-06-12 ENCOUNTER — HOSPITAL ENCOUNTER (EMERGENCY)
Facility: HOSPITAL | Age: 43
Discharge: HOME | End: 2018-06-12
Attending: EMERGENCY MEDICINE
Payer: COMMERCIAL

## 2018-06-12 VITALS
TEMPERATURE: 98.8 F | BODY MASS INDEX: 26.37 KG/M2 | OXYGEN SATURATION: 99 % | RESPIRATION RATE: 18 BRPM | DIASTOLIC BLOOD PRESSURE: 86 MMHG | HEART RATE: 73 BPM | WEIGHT: 135 LBS | SYSTOLIC BLOOD PRESSURE: 129 MMHG

## 2018-06-12 DIAGNOSIS — S61.219A LACERATION OF FINGER: Primary | ICD-10-CM

## 2018-06-12 PROCEDURE — 90471 IMMUNIZATION ADMIN: CPT

## 2018-06-12 PROCEDURE — 99283 EMERGENCY DEPT VISIT LOW MDM: CPT

## 2018-06-12 PROCEDURE — 90715 TDAP VACCINE 7 YRS/> IM: CPT | Performed by: EMERGENCY MEDICINE

## 2018-06-12 RX ORDER — LIDOCAINE HYDROCHLORIDE 10 MG/ML
5 INJECTION, SOLUTION EPIDURAL; INFILTRATION; INTRACAUDAL; PERINEURAL ONCE
Status: COMPLETED | OUTPATIENT
Start: 2018-06-12 | End: 2018-06-12

## 2018-06-12 RX ORDER — BACITRACIN, NEOMYCIN, POLYMYXIN B 400; 3.5; 5 [USP'U]/G; MG/G; [USP'U]/G
1 OINTMENT TOPICAL ONCE
Status: COMPLETED | OUTPATIENT
Start: 2018-06-12 | End: 2018-06-12

## 2018-06-12 RX ORDER — LIDOCAINE WITH 8.4% SOD BICARB 0.9%(10ML)
5 SYRINGE (ML) INJECTION ONCE
Status: DISCONTINUED | OUTPATIENT
Start: 2018-06-12 | End: 2018-06-12

## 2018-06-12 RX ADMIN — TETANUS TOXOID, REDUCED DIPHTHERIA TOXOID AND ACELLULAR PERTUSSIS VACCINE, ADSORBED 0.5 ML: 5; 2.5; 8; 8; 2.5 SUSPENSION INTRAMUSCULAR at 21:37

## 2018-06-12 RX ADMIN — BACITRACIN, NEOMYCIN, POLYMYXIN B 1 SMALL APPLICATION: 400; 3.5; 5 OINTMENT TOPICAL at 22:18

## 2018-06-12 RX ADMIN — LIDOCAINE HYDROCHLORIDE 5 ML: 10 INJECTION, SOLUTION EPIDURAL; INFILTRATION; INTRACAUDAL; PERINEURAL at 21:37

## 2018-06-13 NOTE — ED PROVIDER NOTES
History  Chief Complaint   Patient presents with    Finger Laceration     cutting a tomatoe and cut left thumb on a knife , bleding controlled     Patient is a 36-year-old female presents for a right thumb laceration  Patient says she was cutting tomatoes 0 5 hour ago when the knife slipped and she sliced her thumb  She admits to subjective numbness on the tip of her right thumb  She is unaware when her last tetanus shot was  On exam, there is a 1 cm laceration to the tip of her right thumb   she has intact sensation in her entire thumb  She has good flexion of her DI P and PIP in her thumb  She has intact sensation in her entire hand  Prior to Admission Medications   Prescriptions Last Dose Informant Patient Reported? Taking?   ibuprofen (MOTRIN) 600 mg tablet 6/12/2018 at Unknown time  No Yes   Sig: Take 1 tablet by mouth every 6 (six) hours as needed for mild pain (cramping) for up to 10 days      Facility-Administered Medications: None       Past Medical History:   Diagnosis Date    Migraine     takes excederine migraine       Past Surgical History:   Procedure Laterality Date    DENTAL SURGERY         History reviewed  No pertinent family history  I have reviewed and agree with the history as documented  Social History   Substance Use Topics    Smoking status: Never Smoker    Smokeless tobacco: Not on file    Alcohol use No        Review of Systems   Constitutional: Negative for chills, diaphoresis and fever  HENT: Negative for congestion, sinus pressure, sore throat and trouble swallowing  Eyes: Negative for pain, discharge and itching  Respiratory: Negative for cough, chest tightness, shortness of breath and wheezing  Cardiovascular: Negative for chest pain, palpitations and leg swelling  Gastrointestinal: Negative for abdominal distention, abdominal pain, blood in stool, diarrhea, nausea and vomiting  Endocrine: Negative for polyphagia and polyuria     Genitourinary: Negative for difficulty urinating, dysuria, flank pain, hematuria, pelvic pain and vaginal bleeding  Musculoskeletal: Negative for arthralgias and back pain  Skin: Positive for wound  Negative for rash  Neurological: Positive for numbness  Negative for dizziness, syncope, weakness, light-headedness and headaches  Physical Exam  ED Triage Vitals [06/12/18 2024]   Temperature Pulse Respirations Blood Pressure SpO2   98 8 °F (37 1 °C) 73 18 129/86 99 %      Temp Source Heart Rate Source Patient Position - Orthostatic VS BP Location FiO2 (%)   Tympanic Monitor Sitting Right arm --      Pain Score       6           Orthostatic Vital Signs  Vitals:    06/12/18 2024   BP: 129/86   Pulse: 73   Patient Position - Orthostatic VS: Sitting       Physical Exam   Constitutional: She is oriented to person, place, and time  She appears well-developed and well-nourished  No distress  HENT:   Head: Normocephalic and atraumatic  Eyes: Conjunctivae are normal  Pupils are equal, round, and reactive to light  Neck: Normal range of motion  Neck supple  Cardiovascular: Normal rate, regular rhythm and normal heart sounds  Exam reveals no gallop and no friction rub  No murmur heard  Pulmonary/Chest: Effort normal and breath sounds normal  No respiratory distress  She has no wheezes  Abdominal: Soft  She exhibits no distension  There is no tenderness  There is no guarding  Musculoskeletal: Normal range of motion  She exhibits no edema or deformity  Good flexion in thumb PIP and the DIP   Lymphadenopathy:     She has no cervical adenopathy  Neurological: She is alert and oriented to person, place, and time  No cranial nerve deficit or sensory deficit  She exhibits normal muscle tone  Intact sensation in left thumb   Skin: Skin is warm and dry  No rash noted  1 cm laceration to left thumb tip   Psychiatric: She has a normal mood and affect  Nursing note and vitals reviewed        ED Medications  Medications tetanus-diphtheria-acellular pertussis (BOOSTRIX) IM injection 0 5 mL (0 5 mL Intramuscular Given 6/12/18 2137)   lidocaine (PF) (XYLOCAINE-MPF) 1 % injection 5 mL (5 mL Infiltration Given by Other 6/12/18 2137)   neomycin-bacitracin-polymyxin b (NEOSPORIN) ointment 1 small application (1 small application Topical Given 6/12/18 2218)       Diagnostic Studies  Results Reviewed     None                 No orders to display         Procedures  Lac Repair  Date/Time: 6/13/2018 1:13 AM  Performed by: Dot Sofia  Authorized by: Dot Sofia   Consent: Verbal consent obtained  Risks and benefits: risks, benefits and alternatives were discussed  Consent given by: patient  Patient understanding: patient states understanding of the procedure being performed  Patient identity confirmed: verbally with patient  Body area: upper extremity  Location details: right thumb  Laceration length: 2 cm  Foreign bodies: no foreign bodies  Tendon involvement: none  Nerve involvement: none  Vascular damage: no  Anesthesia: digital block and local infiltration    Anesthesia:  Local Anesthetic: lidocaine 1% with epinephrine    Sedation:  Patient sedated: no    Wound Dehiscence:  Superficial Wound Dehiscence: simple closure      Procedure Details:  Irrigation solution: saline  Irrigation method: syringe  Amount of cleaning: standard  Debridement: none  Degree of undermining: none  Skin closure: 5-0 nylon  Number of sutures: 4  Technique: simple  Approximation: close  Approximation difficulty: simple  Dressing: antibiotic ointment  Patient tolerance: Patient tolerated the procedure well with no immediate complications            Phone Consults  ED Phone Contact    ED Course                               MDM  Number of Diagnoses or Management Options  Laceration of finger:   Diagnosis management comments: 61-year-old female presents right thumb laceration  Patient is unaware last tetanus    Patient has intact sensation in the thumb an entire hand  Patient has good flexion and extension of her thumb  Plan:  Tetanus booster, laceration repair  Patient told to follow up with PCP for suture removal in 7 days    CritCare Time    Disposition  Final diagnoses:   Laceration of finger     Time reflects when diagnosis was documented in both MDM as applicable and the Disposition within this note     Time User Action Codes Description Comment    6/12/2018 10:14 PM Saenz Media Add [[id number] [id number]] Laceration of finger       ED Disposition     ED Disposition Condition Comment    Discharge  Michael Guidry discharge to home/self care  Condition at discharge: Stable        Follow-up Information     Follow up With Specialties Details Why [address] [facility],[address], MD Reproductive Endocrinology and Infertility, Obstetrics and Gynecology, Obstetrics, Gynecology In 7 days suture removal  [address]  [phone]            Discharge Medication List as of 6/12/2018 10:17 PM      CONTINUE these medications which have NOT CHANGED    Details   ibuprofen (MOTRIN) 600 mg tablet Take 1 tablet by mouth every 6 (six) hours as needed for mild pain (cramping) for up to 10 days, Starting Mon 2/6/2017, Until Tue 6/12/2018, Print           No discharge procedures on file  ED Provider  Attending physically available and evaluated Michael Guidry  CHARLY managed the patient along with the ED Attending      Electronically Signed by         Gemma Ang DO  06/13/18 9606

## 2018-06-13 NOTE — ED ATTENDING ATTESTATION
Francisco Strange MD, saw and evaluated the patient  I have discussed the patient with the resident/non-physician practitioner and agree with the resident's/non-physician practitioner's findings, Plan of Care, and MDM as documented in the resident's/non-physician practitioner's note, except where noted  All available labs and Radiology studies were reviewed  At this point I agree with the current assessment done in the Emergency Department    I have conducted an independent evaluation of this patient a history and physical is as follows:    Sliced thumb with a knife PE; sensation intact to light touch full range of motion lac noted mdm: will repair  Critical Care Time  CritCare Time    Procedures

## 2018-06-13 NOTE — DISCHARGE INSTRUCTIONS
Finger Laceration   WHAT YOU NEED TO KNOW:   A finger laceration is a deep cut in your skin  It is often caused by a sharp object, such as a knife, or blunt force to your finger  Your blood vessels, bones, joints, tendons, or nerves may also be injured  DISCHARGE INSTRUCTIONS:   Return to the emergency department if:   · Your wound comes apart  · Blood soaks through your bandage  · You have severe pain in your finger or hand  · Your finger is pale and cold  · You have sudden trouble moving your finger  · Your swelling suddenly gets worse  · You have red streaks on your skin coming from your wound  Contact your healthcare provider or hand specialist if:   · You have new numbness or tingling  · Your finger feels warm, looks swollen or red, and is draining pus  · You have a fever  · You have questions or concerns about your condition or care  Medicines: You may  need any of the following:  · Antibiotics  help prevent a bacterial infection  · Acetaminophen  decreases pain and fever  It is available without a doctor's order  Ask how much to take and how often to take it  Follow directions  Read the labels of all other medicines you are using to see if they also contain acetaminophen, or ask your doctor or pharmacist  Acetaminophen can cause liver damage if not taken correctly  Do not use more than 4 grams (4,000 milligrams) total of acetaminophen in one day  · Prescription pain medicine  may be given  Ask your healthcare provider how to take this medicine safely  Some prescription pain medicines contain acetaminophen  Do not take other medicines that contain acetaminophen without talking to your healthcare provider  Too much acetaminophen may cause liver damage  Prescription pain medicine may cause constipation  Ask your healthcare provider how to prevent or treat constipation  · Take your medicine as directed    Contact your healthcare provider if you think your medicine is not helping or if you have side effects  Tell him or her if you are allergic to any medicine  Keep a list of the medicines, vitamins, and herbs you take  Include the amounts, and when and why you take them  Bring the list or the pill bottles to follow-up visits  Carry your medicine list with you in case of an emergency  Self-care:   · Apply ice  on your finger for 15 to 20 minutes every hour or as directed  Use an ice pack, or put crushed ice in a plastic bag  Cover it with a towel before you apply it to your skin  Ice helps prevent tissue damage and decreases swelling and pain  · Elevate  your hand above the level of your heart as often as you can  This will help decrease swelling and pain  Prop your hand on pillows or blankets to keep it elevated comfortably  · Wear your splint as directed  A splint will decrease movement and stress on your wound  The splint may help your wound heal faster  Ask your healthcare provider how to apply and remove a splint  · Apply ointments to decrease scarring  Do not apply ointments until your healthcare provider says it is okay  You may need to wait until your wound is healed  Ask which ointment to buy and how often to use it  Wound care:   · Do not get your wound wet until your healthcare provider says it is okay  Do not soak your hand in water  Do not go swimming until your healthcare provider says it is okay  When your healthcare provider says it is okay, carefully wash around the wound with soap and water  Let soap and water run over your wound  Gently pat the area dry or allow it to air dry  · Change your bandages when they get wet, dirty, or after washing  Apply new, clean bandages as directed  Do not apply elastic bandages or tape too tightly  Do not put powders or lotions on your wound  · Apply antibiotic ointment as directed  Your healthcare provider may give you antibiotic ointment to put over your wound if you have stitches   If you have Strips-Strips over your wound, let them dry up and fall off on their own  If they do not fall off within 14 days, gently remove them  If you have glue over your wound, do not remove or pick at it  If your glue comes off, do not replace it with glue that you have at home  · Check your wound every day for signs of infection  Signs of infection include swelling, redness, or pus  Follow up with your healthcare provider or hand specialist in 2 days:  Write down your questions so you remember to ask them during your visits  © 2017 [address] [employer]  Information is for End User's use only and may not be sold, redistributed or otherwise used for commercial purposes  All illustrations and images included in CareNotes® are the copyrighted property of [employer] , [employer]  or Isiah Nava  The above information is an  only  It is not intended as medical advice for individual conditions or treatments  Talk to your doctor, nurse or pharmacist before following any medical regimen to see if it is safe and effective for you

## 2018-10-23 ENCOUNTER — OFFICE VISIT (OUTPATIENT)
Dept: URGENT CARE | Facility: CLINIC | Age: 43
End: 2018-10-23
Payer: COMMERCIAL

## 2018-10-23 VITALS
BODY MASS INDEX: 34.71 KG/M2 | HEIGHT: 55 IN | WEIGHT: 150 LBS | TEMPERATURE: 101.9 F | HEART RATE: 123 BPM | DIASTOLIC BLOOD PRESSURE: 70 MMHG | RESPIRATION RATE: 14 BRPM | SYSTOLIC BLOOD PRESSURE: 120 MMHG

## 2018-10-23 DIAGNOSIS — J02.9 ACUTE PHARYNGITIS, UNSPECIFIED ETIOLOGY: ICD-10-CM

## 2018-10-23 DIAGNOSIS — H65.91 RIGHT NON-SUPPURATIVE OTITIS MEDIA: Primary | ICD-10-CM

## 2018-10-23 DIAGNOSIS — M54.42 ACUTE BACK PAIN WITH SCIATICA, LEFT: ICD-10-CM

## 2018-10-23 PROCEDURE — 99213 OFFICE O/P EST LOW 20 MIN: CPT

## 2018-10-23 RX ORDER — AMOXICILLIN 500 MG/1
500 TABLET, FILM COATED ORAL 2 TIMES DAILY
Qty: 10 TABLET | Refills: 0 | Status: SHIPPED | OUTPATIENT
Start: 2018-10-23 | End: 2018-10-28

## 2018-10-23 RX ORDER — IBUPROFEN 600 MG/1
600 TABLET ORAL EVERY 8 HOURS PRN
Qty: 15 TABLET | Refills: 0 | Status: SHIPPED | OUTPATIENT
Start: 2018-10-23 | End: 2019-10-29

## 2018-10-23 RX ORDER — LIDOCAINE 50 MG/G
1 PATCH TOPICAL DAILY
Qty: 5 PATCH | Refills: 0 | Status: SHIPPED | OUTPATIENT
Start: 2018-10-23 | End: 2019-10-29

## 2018-10-23 RX ORDER — DEXAMETHASONE 4 MG/1
8 TABLET ORAL ONCE
Qty: 2 TABLET | Refills: 0 | Status: SHIPPED | OUTPATIENT
Start: 2018-10-23 | End: 2018-10-23

## 2018-10-23 NOTE — PROGRESS NOTES
[facility]        NAME: Chandler Reynolds is a 44 y o  female  : 1975    MRN: [id number]  DATE: 2018  TIME: 3:00 PM    Assessment and Plan   Right non-suppurative otitis media [H65 91]  1  Right non-suppurative otitis media  amoxicillin (AMOXIL) 500 MG tablet   2  Acute back pain with sciatica, left  lidocaine (LIDODERM) 5 %    ibuprofen (MOTRIN) 600 mg tablet   3  Acute pharyngitis, unspecified etiology  dexamethasone (DECADRON) 4 mg tablet         Patient Instructions       Follow up with PCP in 3-5 days  Proceed to  ER if symptoms worsen  Chief Complaint     Chief Complaint   Patient presents with    Fever     x1 day, taking tylenol, right sided flank/back pain, O2- 96%         History of Present Illness       One day history of right ear pain, sore throat and left low back pain that radiates into left buttock  Denies any injury or fall  Had 25 lb child she carries but denies any pain due to carrying child  Denies sick contacts in household  Intermittent fever reported with some relief from tylenol  Did not try ibuprofen  Used heat on back last night with some relief  Works in an office setting and moves frequently  Denies any loss hearing , nasal congestion, sinus pain  Review of Systems   Review of Systems   Constitutional: Positive for fever  Negative for activity change and chills  HENT: Positive for ear pain and sore throat  Negative for congestion, ear discharge, facial swelling, postnasal drip, rhinorrhea, sinus pain, sinus pressure, sneezing, tinnitus and trouble swallowing  Eyes: Negative  Negative for discharge and redness  Respiratory: Negative  Negative for cough, shortness of breath and wheezing  Cardiovascular: Negative  Negative for chest pain  Gastrointestinal: Negative  Negative for abdominal pain  Endocrine: Negative  Genitourinary: Negative  Musculoskeletal: Positive for back pain   Negative for gait problem, joint swelling, myalgias and neck pain  Skin: Negative  Negative for color change  Neurological: Negative  Negative for dizziness and weakness  Psychiatric/Behavioral: Negative  Negative for agitation  Current Medications       Current Outpatient Prescriptions:     amoxicillin (AMOXIL) 500 MG tablet, Take 1 tablet (500 mg total) by mouth 2 (two) times a day for 5 days, Disp: 10 tablet, Rfl: 0    dexamethasone (DECADRON) 4 mg tablet, Take 2 tablets (8 mg total) by mouth once for 1 dose, Disp: 2 tablet, Rfl: 0    ibuprofen (MOTRIN) 600 mg tablet, Take 1 tablet by mouth every 6 (six) hours as needed for mild pain (cramping) for up to 10 days, Disp: 30 tablet, Rfl: 0    ibuprofen (MOTRIN) 600 mg tablet, Take 1 tablet (600 mg total) by mouth every 8 (eight) hours as needed for mild pain for up to 5 days, Disp: 15 tablet, Rfl: 0    lidocaine (LIDODERM) 5 %, Apply 1 patch topically daily for 5 days Remove & Discard patch within 12 hours or as directed by MD, Disp: 5 patch, Rfl: 0    Current Allergies     Allergies as of 10/23/2018 - Reviewed 10/23/2018   Allergen Reaction Noted    Other Swelling 02/04/2017            The following portions of the patient's history were reviewed and updated as appropriate: allergies, current medications, past family history, past medical history, past social history, past surgical history and problem list      Past Medical History:   Diagnosis Date    Migraine     takes excederine migraine       Past Surgical History:   Procedure Laterality Date    DENTAL SURGERY         No family history on file  Medications have been verified  Objective   /70   Pulse (!) 123   Temp (!) 101 9 °F (38 8 °C)   Resp 14   Ht 4' 7" (1 397 m)   Wt 68 kg (150 lb)   BMI 34 86 kg/m²        Physical Exam     Physical Exam   Constitutional: She is oriented to person, place, and time  She appears well-developed and well-nourished  HENT:   Head: Normocephalic and atraumatic     Right Ear: Hearing and ear canal normal  No lacerations  No swelling or tenderness  No foreign bodies  Tympanic membrane is erythematous and bulging  Tympanic membrane is not injected, not scarred, not perforated and not retracted  Tympanic membrane mobility is abnormal  No decreased hearing is noted  Left Ear: Hearing, tympanic membrane, external ear and ear canal normal    Nose: Nose normal    Mouth/Throat: Mucous membranes are normal  Posterior oropharyngeal erythema present  No oropharyngeal exudate, posterior oropharyngeal edema or tonsillar abscesses  Eyes: Pupils are equal, round, and reactive to light  Conjunctivae and EOM are normal  Right eye exhibits no discharge  Left eye exhibits no discharge  Neck: Normal range of motion  Neck supple  No thyromegaly present  Cardiovascular: Normal rate and normal heart sounds  Exam reveals no gallop and no friction rub  No murmur heard  Pulmonary/Chest: Effort normal and breath sounds normal  No respiratory distress  Abdominal: Soft  Bowel sounds are normal  She exhibits no distension and no mass  There is no tenderness  There is no rebound and no guarding  Musculoskeletal: Normal range of motion  She exhibits no edema or deformity  Lumbar back: She exhibits tenderness, pain and spasm  She exhibits normal range of motion, no bony tenderness, no swelling, no edema, no deformity and no laceration  Back:    Neurological: She is alert and oriented to person, place, and time  Skin: Skin is warm, dry and intact  No pallor  Psychiatric: She has a normal mood and affect  Her behavior is normal  Judgment and thought content normal    Nursing note and vitals reviewed

## 2018-10-23 NOTE — PATIENT INSTRUCTIONS
Otitis Media   WHAT YOU NEED TO KNOW:   Otitis media is an ear infection  DISCHARGE INSTRUCTIONS:   Medicines:  · Ibuprofen or acetaminophen  helps decrease your pain and fever  They are available without a doctor's order  Ask your healthcare provider which medicine is right for you  Ask how much to take and how often to take it  These medicines can cause stomach bleeding if not taken correctly  Ibuprofen can cause kidney damage  Do not take ibuprofen if you have kidney disease, an ulcer, or allergies to aspirin  Acetaminophen can cause liver damage  Do not drink alcohol if you take acetaminophen  · Ear drops  help treat your ear pain  · Antibiotics  help treat a bacterial infection that caused your ear infection  · Take your medicine as directed  Contact your healthcare provider if you think your medicine is not helping or if you have side effects  Tell him or her if you are allergic to any medicine  Keep a list of the medicines, vitamins, and herbs you take  Include the amounts, and when and why you take them  Bring the list or the pill bottles to follow-up visits  Carry your medicine list with you in case of an emergency  Heat or ice:   · Heat  may be used to decrease your pain  Place a warm, moist washcloth on your ear  Apply for 15 to 20 minutes, 3 to 4 times a day    · Ice  helps decrease swelling and pain  Use an ice pack or put crushed ice in a plastic bag  Cover the ice pack with a towel and place it on your ear for 15 to 20 minutes, 3 to 4 times a day for 2 days  Prevent otitis media:   · Wash your hands often  Use soap and water  Wash your hands after you use the bathroom, change a child's diapers, or sneeze  Wash your hands before you prepare or eat food  · Stay away from people who are ill  Some germs are easily and quickly spread through contact  Return to work or school: You may return to work or school when your fever is gone     Follow up with your healthcare provider as directed:  Write down your questions so you remember to ask them during your visits  Contact your healthcare provider if:   · Your ear pain gets worse or does not go away, even after treatment  · The outside of your ear is red or swollen  · You have vomiting or diarrhea  · You have fluid coming from your ear  · You have questions or concerns about your condition or care  Return to the emergency department if:   · You have a seizure  · You have a fever and a stiff neck  © 2017 [facility] [facility] Information is for End User's use only and may not be sold, redistributed or otherwise used for commercial purposes  All illustrations and images included in CareNotes® are the copyrighted property of [employer]  or Isiah Nava  The above information is an  only  It is not intended as medical advice for individual conditions or treatments  Talk to your doctor, nurse or pharmacist before following any medical regimen to see if it is safe and effective for you

## 2019-10-03 ENCOUNTER — HOSPITAL ENCOUNTER (EMERGENCY)
Facility: HOSPITAL | Age: 44
Discharge: HOME | End: 2019-10-03
Attending: EMERGENCY MEDICINE
Payer: COMMERCIAL

## 2019-10-03 VITALS
SYSTOLIC BLOOD PRESSURE: 122 MMHG | RESPIRATION RATE: 18 BRPM | HEIGHT: 55 IN | HEART RATE: 66 BPM | DIASTOLIC BLOOD PRESSURE: 69 MMHG | BODY MASS INDEX: 32.4 KG/M2 | OXYGEN SATURATION: 98 % | TEMPERATURE: 97.9 F | WEIGHT: 140 LBS

## 2019-10-03 DIAGNOSIS — R19.7 DIARRHEA, UNSPECIFIED TYPE: ICD-10-CM

## 2019-10-03 DIAGNOSIS — R10.13 EPIGASTRIC PAIN: Primary | ICD-10-CM

## 2019-10-03 LAB
ALBUMIN SERPL BCP-MCNC: 3.8 G/DL (ref 3.5–5)
ALP SERPL-CCNC: 84 U/L (ref 46–116)
ALT SERPL W P-5'-P-CCNC: 22 U/L (ref 12–78)
ANION GAP SERPL CALCULATED.3IONS-SCNC: 6 MMOL/L (ref 4–13)
AST SERPL W P-5'-P-CCNC: 16 U/L (ref 5–45)
BASOPHILS # BLD AUTO: 0.03 THOUSANDS/ΜL (ref 0–0.1)
BASOPHILS NFR BLD AUTO: 0 % (ref 0–1)
BILIRUB SERPL-MCNC: 0.13 MG/DL (ref 0.2–1)
BUN SERPL-MCNC: 18 MG/DL (ref 5–25)
CALCIUM SERPL-MCNC: 9 MG/DL (ref 8.3–10.1)
CHLORIDE SERPL-SCNC: 106 MMOL/L (ref 100–108)
CO2 SERPL-SCNC: 26 MMOL/L (ref 21–32)
CREAT SERPL-MCNC: 0.62 MG/DL (ref 0.6–1.3)
EOSINOPHIL # BLD AUTO: 0.08 THOUSAND/ΜL (ref 0–0.61)
EOSINOPHIL NFR BLD AUTO: 1 % (ref 0–6)
ERYTHROCYTE [DISTWIDTH] IN BLOOD BY AUTOMATED COUNT: 12 % (ref 11.6–15.1)
EXT PREG TEST URINE: NEGATIVE
EXT. CONTROL ED NAV: NORMAL
GFR SERPL CREATININE-BSD FRML MDRD: 111 ML/MIN/1.73SQ M
GLUCOSE SERPL-MCNC: 118 MG/DL (ref 65–140)
HCT VFR BLD AUTO: 39.4 % (ref 34.8–46.1)
HGB BLD-MCNC: 13.3 G/DL (ref 11.5–15.4)
IMM GRANULOCYTES # BLD AUTO: 0.04 THOUSAND/UL (ref 0–0.2)
IMM GRANULOCYTES NFR BLD AUTO: 0 % (ref 0–2)
LIPASE SERPL-CCNC: 106 U/L (ref 73–393)
LYMPHOCYTES # BLD AUTO: 3.4 THOUSANDS/ΜL (ref 0.6–4.47)
LYMPHOCYTES NFR BLD AUTO: 37 % (ref 14–44)
MCH RBC QN AUTO: 30.9 PG (ref 26.8–34.3)
MCHC RBC AUTO-ENTMCNC: 33.8 G/DL (ref 31.4–37.4)
MCV RBC AUTO: 92 FL (ref 82–98)
MONOCYTES # BLD AUTO: 0.56 THOUSAND/ΜL (ref 0.17–1.22)
MONOCYTES NFR BLD AUTO: 6 % (ref 4–12)
NEUTROPHILS # BLD AUTO: 5 THOUSANDS/ΜL (ref 1.85–7.62)
NEUTS SEG NFR BLD AUTO: 56 % (ref 43–75)
NRBC BLD AUTO-RTO: 0 /100 WBCS
PLATELET # BLD AUTO: 252 THOUSANDS/UL (ref 149–390)
PMV BLD AUTO: 11.1 FL (ref 8.9–12.7)
POTASSIUM SERPL-SCNC: 3.5 MMOL/L (ref 3.5–5.3)
PROT SERPL-MCNC: 7.6 G/DL (ref 6.4–8.2)
RBC # BLD AUTO: 4.3 MILLION/UL (ref 3.81–5.12)
SODIUM SERPL-SCNC: 138 MMOL/L (ref 136–145)
WBC # BLD AUTO: 9.11 THOUSAND/UL (ref 4.31–10.16)

## 2019-10-03 PROCEDURE — 99284 EMERGENCY DEPT VISIT MOD MDM: CPT

## 2019-10-03 PROCEDURE — 36415 COLL VENOUS BLD VENIPUNCTURE: CPT | Performed by: EMERGENCY MEDICINE

## 2019-10-03 PROCEDURE — 96360 HYDRATION IV INFUSION INIT: CPT

## 2019-10-03 PROCEDURE — 83690 ASSAY OF LIPASE: CPT | Performed by: EMERGENCY MEDICINE

## 2019-10-03 PROCEDURE — 80053 COMPREHEN METABOLIC PANEL: CPT | Performed by: EMERGENCY MEDICINE

## 2019-10-03 PROCEDURE — 85025 COMPLETE CBC W/AUTO DIFF WBC: CPT | Performed by: EMERGENCY MEDICINE

## 2019-10-03 PROCEDURE — 99284 EMERGENCY DEPT VISIT MOD MDM: CPT | Performed by: EMERGENCY MEDICINE

## 2019-10-03 PROCEDURE — 81025 URINE PREGNANCY TEST: CPT | Performed by: EMERGENCY MEDICINE

## 2019-10-03 RX ORDER — DICYCLOMINE HCL 20 MG
20 TABLET ORAL 2 TIMES DAILY
Qty: 20 TABLET | Refills: 0 | Status: SHIPPED | OUTPATIENT
Start: 2019-10-03 | End: 2019-10-29

## 2019-10-03 RX ORDER — ONDANSETRON 4 MG/1
4 TABLET, ORALLY DISINTEGRATING ORAL ONCE
Status: COMPLETED | OUTPATIENT
Start: 2019-10-03 | End: 2019-10-03

## 2019-10-03 RX ADMIN — SODIUM CHLORIDE 1000 ML: 0.9 INJECTION, SOLUTION INTRAVENOUS at 22:03

## 2019-10-03 RX ADMIN — ONDANSETRON 4 MG: 4 TABLET, ORALLY DISINTEGRATING ORAL at 22:03

## 2019-10-04 NOTE — DISCHARGE INSTRUCTIONS
You were seen today in the Emergency Department for abdominal pain and diarrhea  Your workup included blood work and revealed nothing concerning at this time  Please follow up with your Primary Care Provider in the next 1-2 days if your symptoms persist       Please return to the Emergency Department if you have fevers, chills, chest pain, shortness of breath, your abdominal pain worsens, are unable to eat or drink, or have any other concerning symptoms  I have attached an educational handout about your symptoms  Please look this over and let your nurse and/or me know if you have any further questions before you leave

## 2019-10-04 NOTE — ED PROVIDER NOTES
History  Chief Complaint   Patient presents with    Abdominal Pain     pt reports abd pain since yesterday  Pt denies N/V but reports diarrhea     George Russo is an 38y o  year old female with no significant PMHx, who presents to the ED today with abdominal pain for 2 days, preceded by diarrhea for 4-5 days  The patient says that 1 of her children had similar symptoms, was diagnosed with a viral gastroenteritis last week, and so she thought that this was probably which she had as well  Yesterday she started to have sharp epigastric abdominal pain radiating around her body to her back  She endorses minimal nausea, no vomiting  Her diarrhea is not black or bloody  The pain is not exacerbated or relieved by anything in particular  She endorses decreased oral intake over the last couple of days due to her symptoms  She has taken a couple about resolved she is prescribed for GERD, but this has not abated her abdominal pain  She currently rates her pain at 8/10 in severity  She did not think that she could be pregnant because she had an IUD  He has no history of abdominal surgeries  She is able to pass gas  The patient denies fevers, chills, headaches, lightheadedness or syncope, vertigo, vision changes, hearing changes, chest pain, shortness of breath, cough, changes with urination, back pain, numbness or tingling, rashes, pain anywhere else in body  No vaginal bleeding or discharge  The patient has no recent travel history, new or changing medications, or immunocompromise  The patient is sexually active with 1 partner          History provided by:  Patient   used: No    Abdominal Pain   Pain location:  Epigastric  Pain quality: sharp and stabbing    Pain radiates to:  Back (around to, not through to)  Pain severity:  Severe  Onset quality:  Sudden  Timing:  Constant  Chronicity:  New  Context: sick contacts    Context: not eating, not previous surgeries, not recent illness and not trauma    Relieved by:  Nothing  Worsened by:  Nothing  Ineffective treatments:  Antacids  Associated symptoms: diarrhea and nausea    Associated symptoms: no anorexia, no chest pain, no chills, no constipation, no cough, no dysuria, no fatigue, no fever, no flatus, no hematemesis, no hematochezia, no hematuria, no melena, no shortness of breath, no sore throat, no vaginal bleeding, no vaginal discharge and no vomiting    Risk factors: has not had multiple surgeries        Prior to Admission Medications   Prescriptions Last Dose Informant Patient Reported? Taking?   ibuprofen (MOTRIN) 600 mg tablet   No Yes   Sig: Take 1 tablet (600 mg total) by mouth every 8 (eight) hours as needed for mild pain for up to 5 days   lidocaine (LIDODERM) 5 %   No No   Sig: Apply 1 patch topically daily for 5 days Remove & Discard patch within 12 hours or as directed by MD      Facility-Administered Medications: None       Past Medical History:   Diagnosis Date    Migraine     takes excederine migraine       Past Surgical History:   Procedure Laterality Date    DENTAL SURGERY         History reviewed  No pertinent family history  I have reviewed and agree with the history as documented  Social History     Tobacco Use    Smoking status: Never Smoker    Smokeless tobacco: Never Used   Substance Use Topics    Alcohol use: No    Drug use: No        Review of Systems   Constitutional: Negative for activity change, appetite change, chills, diaphoresis, fatigue and fever  HENT: Negative for rhinorrhea and sore throat  Eyes: Negative for visual disturbance  Respiratory: Negative for cough and shortness of breath  Cardiovascular: Negative for chest pain, palpitations and leg swelling  Gastrointestinal: Positive for abdominal pain, diarrhea and nausea  Negative for abdominal distention, anorexia, blood in stool, constipation, flatus, hematemesis, hematochezia, melena and vomiting     Genitourinary: Negative for difficulty urinating, dysuria, flank pain, hematuria, vaginal bleeding and vaginal discharge  Musculoskeletal: Positive for back pain  Negative for arthralgias, myalgias, neck pain and neck stiffness  Skin: Negative for rash and wound  Allergic/Immunologic: Negative for immunocompromised state  Neurological: Negative for dizziness, syncope, weakness, light-headedness, numbness and headaches  Hematological: Does not bruise/bleed easily  Psychiatric/Behavioral: Negative for confusion  All other systems reviewed and are negative  Physical Exam  ED Triage Vitals [10/03/19 2147]   Temperature Pulse Respirations Blood Pressure SpO2   97 9 °F (36 6 °C) 66 18 122/69 98 %      Temp src Heart Rate Source Patient Position - Orthostatic VS BP Location FiO2 (%)   -- -- -- -- --      Pain Score       8             Orthostatic Vital Signs  Vitals:    10/03/19 2147   BP: 122/69   Pulse: 66       Physical Exam   Constitutional: She is oriented to person, place, and time  She appears well-developed and well-nourished  No distress  HENT:   Head: Normocephalic and atraumatic  Mouth/Throat: Oropharynx is clear and moist  No oropharyngeal exudate  Eyes: Conjunctivae are normal  No scleral icterus  Neck: Neck supple  No JVD present  No tracheal deviation present  Cardiovascular: Normal rate, regular rhythm and intact distal pulses  Pulmonary/Chest: Effort normal and breath sounds normal  No respiratory distress  Abdominal: Soft  She exhibits no distension and no mass  There is tenderness in the epigastric area  There is no rigidity, no rebound, no guarding, no tenderness at McBurney's point and negative Saenz's sign  Musculoskeletal: She exhibits no edema or deformity  Neurological: She is alert and oriented to person, place, and time  Moves all extremities   Skin: Skin is warm and dry  Capillary refill takes less than 2 seconds  No rash noted  She is not diaphoretic     Psychiatric: She has a normal mood and affect  Her behavior is normal    Vitals reviewed        ED Medications  Medications   ondansetron (ZOFRAN-ODT) dispersible tablet 4 mg (4 mg Oral Given 10/3/19 2203)   sodium chloride 0 9 % bolus 1,000 mL (0 mL Intravenous Stopped 10/3/19 2316)       Diagnostic Studies  Results Reviewed     Procedure Component Value Units Date/Time    POCT pregnancy, urine [[id number]]  (Normal) Resulted:  10/03/19 2302    Lab Status:  Final result Updated:  10/03/19 2302     EXT PREG TEST UR (Ref: Negative) negative     Control valid    Comprehensive metabolic panel [[id number]]  (Abnormal) Collected:  10/03/19 2203    Lab Status:  Final result Specimen:  Blood from Arm, Left Updated:  10/03/19 2231     Sodium 138 mmol/L      Potassium 3 5 mmol/L      Chloride 106 mmol/L      CO2 26 mmol/L      ANION GAP 6 mmol/L      BUN 18 mg/dL      Creatinine 0 62 mg/dL      Glucose 118 mg/dL      Calcium 9 0 mg/dL      AST 16 U/L      ALT 22 U/L      Alkaline Phosphatase 84 U/L      Total Protein 7 6 g/dL      Albumin 3 8 g/dL      Total Bilirubin 0 13 mg/dL      eGFR 111 ml/min/1 73sq m     Narrative:       [place] guidelines for Chronic Kidney Disease (CKD):     Stage 1 with normal or high GFR (GFR > 90 mL/min/1 73 square meters)    Stage 2 Mild CKD (GFR = 60-89 mL/min/1 73 square meters)    Stage 3A Moderate CKD (GFR = 45-59 mL/min/1 73 square meters)    Stage 3B Moderate CKD (GFR = 30-44 mL/min/1 73 square meters)    Stage 4 Severe CKD (GFR = 15-29 mL/min/1 73 square meters)    Stage 5 End Stage CKD (GFR <15 mL/min/1 73 square meters)  Note: GFR calculation is accurate only with a steady state creatinine    Lipase [[id number]]  (Normal) Collected:  10/03/19 2203    Lab Status:  Final result Specimen:  Blood from Arm, Left Updated:  10/03/19 2231     Lipase 106 u/L     CBC and differential [[id number]] Collected:  10/03/19 2203    Lab Status:  Final result Specimen:  Blood from Arm, Left Updated:  10/03/19 2212     WBC 9 11 Thousand/uL      RBC 4 30 Million/uL      Hemoglobin 13 3 g/dL      Hematocrit 39 4 %      MCV 92 fL      MCH 30 9 pg      MCHC 33 8 g/dL      RDW 12 0 %      MPV 11 1 fL      Platelets 417 Thousands/uL      nRBC 0 /100 WBCs      Neutrophils Relative 56 %      Immat GRANS % 0 %      Lymphocytes Relative 37 %      Monocytes Relative 6 %      Eosinophils Relative 1 %      Basophils Relative 0 %      Neutrophils Absolute 5 00 Thousands/µL      Immature Grans Absolute 0 04 Thousand/uL      Lymphocytes Absolute 3 40 Thousands/µL      Monocytes Absolute 0 56 Thousand/µL      Eosinophils Absolute 0 08 Thousand/µL      Basophils Absolute 0 03 Thousands/µL                  No orders to display         Procedures  Procedures        ED Course                               MDM  Number of Diagnoses or Management Options  Diarrhea, unspecified type: new and does not require workup  Epigastric pain: new and does not require workup  Diagnosis management comments: 38year old female with no PMHx presents with 2 days of abdominal pain and 4-5 days of diarrhea in the setting of sick contact with similar illness  No pain out of proportion to exam or worsening of pain with or shortly after eating  Pain not colicky  No melena or hematochezia  No peritoneal signs on exam   Patient has no pulsatile abdominal mass, known aneurysm, pulse deficit or asymmetry, tearing or ripping pain  No tenderness at McBurney's point and no positive Saenz sign, Rovsing sign  No radiation of pain from flank to groin, CVA tenderness, urinary complains, or history of urolithiasis  No vaginal bleeding or discharge  No history of abdominal trauma or sickle cell disease  Patient does not have a hx of abdominal surgeries  Patient is not pregnant  Vital signs normal, no fever  Patient is able to pass flatus and stool and can tolerate Po  Workup today reveals no abnormalities    She has been treated with zofran and 1 liter bolus ns and discharged with a prescription with Bentyl and strict return precautions  Amount and/or Complexity of Data Reviewed  Clinical lab tests: ordered and reviewed  Tests in the medicine section of CPT®: ordered and reviewed  Review and summarize past medical records: yes  Discuss the patient with other providers: yes    Risk of Complications, Morbidity, and/or Mortality  Presenting problems: low  Diagnostic procedures: low  Management options: low    Patient Progress  Patient progress: stable      Disposition  Final diagnoses:   Epigastric pain   Diarrhea, unspecified type     Time reflects when diagnosis was documented in both MDM as applicable and the Disposition within this note     Time User Action Codes Description Comment    10/3/2019 11:10 PM Gwendolydeuce Percy Add [R10 13] Epigastric pain     10/3/2019 11:10 PM Gwendolynn Percy Add [R19 7] Diarrhea, unspecified type       ED Disposition     ED Disposition Condition Date/Time Comment    Discharge Stable u Oct 3, 2019 11:10 PM Dylan Posada discharge to home/self care  Return precautions given and questions answered  Follow-up Information     Follow up With Specialties Details Why Contact Info    Khalida Ochoa MD Family Medicine  [facility] [address] [facility]  [phone]            Discharge Medication List as of 10/3/2019 11:12 PM      CONTINUE these medications which have NOT CHANGED    Details   ibuprofen (MOTRIN) 600 mg tablet Take 1 tablet (600 mg total) by mouth every 8 (eight) hours as needed for mild pain for up to 5 days, Starting Tue 10/23/2018, Until Thu 10/3/2019, Normal      lidocaine (LIDODERM) 5 % Apply 1 patch topically daily for 5 days Remove & Discard patch within 12 hours or as directed by MD, Starting Tue 10/23/2018, Until Sun 10/28/2018, Normal           No discharge procedures on file  ED Provider  Attending physically available and evaluated Dylan Posada   I managed the patient along with the ED Attending      Electronically Signed by         Burgess Jyoti DO  10/03/19 4868

## 2019-10-28 NOTE — PROGRESS NOTES
Subjective      Lilia Srinivasan is a 38 y o  female who presents for annual well woman exam   Last Pap smear 16 resulted NILM/ HR HPV negative  Next Pap smear due 2021  Has not had a mammogram   Mammogram ordered today  No menses has Mirena IUD  No intermenstrual bleeding, spotting, or discharge  Patient requests a STI testing today  Current contraception: Mirena IUD placed 17 effective through 2022  History of abnormal Pap smear: no  Family history of uterine or ovarian cancer: no  Regular self breast exam: yes  History of abnormal mammogram:  Mammogram ordered today  Family history of breast cancer: yes- mother and m-grandmother   History of abnormal lipids: no  Gardasil: no      Menstrual History:  OB History        5    Para   3    Term   3            AB   2    Living   3       SAB   2    TAB        Ectopic        Multiple   0    Live Births   3                Menarche age: 6  No LMP recorded (lmp unknown)  (Menstrual status: Birth Control)  Period Duration (Days): mirena -no menses    The following portions of the patient's history were reviewed and updated as appropriate: allergies, current medications, past family history, past medical history, past social history, past surgical history and problem list     Review of Systems  Pertinent items are noted in HPI        Objective      /55 (BP Location: Left arm, Patient Position: Sitting, Cuff Size: Standard)   Pulse 73   Ht 5' (1 524 m)   Wt 65 8 kg (145 lb)   LMP  (LMP Unknown)   BMI 28 32 kg/m²     General:   alert and oriented, in no acute distress, alert, appears stated age and cooperative   Heart: regular rate and rhythm, S1, S2 normal, no murmur, click, rub or gallop   Lungs: clear to auscultation bilaterally   Abdomen: soft, non-tender, without masses or organomegaly, nondistended and normal bowel sounds   Vulva: normal, Bartholin's, Urethra, [place]'s normal, female escutcheon   Vagina: normal mucosa, normal discharge, no palpable nodules, Mirena IUD strings easily visible on exam    Cervix: no cervical motion tenderness and no lesions   Uterus: normal size, non-tender, normal shape and consistency   Adnexa: normal adnexa and no mass, fullness, tenderness   Breast:  Nontender, no palpable masses, no nipple discharge, no skin changes bilaterally           Assessment      @well woman  no contraindication to continue hormonal therapy@   Plan      All questions answered  Blood tests: HIV, RPR and hepatitis-B  Breast self exam technique reviewed and patient encouraged to perform self-exam monthly  Contraception: Mirena IUD effective through 7/24/2022  Diagnosis explained in detail, including differential   Dietary diary  Discussed healthy lifestyle modifications  Educational material distributed  Follow up in 1 Year for annual exam   Follow up as needed  GC specimen  Mammogram   Breast awareness reviewed    Gardasil vaccine recommendations up to age 39 reviewed  Written information provided  Will call with results  Provided list of PCPs for patient

## 2019-10-28 NOTE — PATIENT INSTRUCTIONS
Mammogram   GENERAL INFORMATION:   What is a mammogram?  A mammogram is an x-ray of your breasts to screen for breast cancer  Who should have a mammogram?  You should have a mammogram if you felt a lump or noticed other changes during a breast self-exam  Talk to your caregiver about when you should start having mammograms  How do I get ready for a mammogram?   · Do not use deodorant, powder, lotion, or perfume  These products may cause particles to appear on your mammogram      · Wear a 2-piece outfit  · If you are breastfeeding, express as much milk as possible before the mammogram     · Bring a list of the dates and places of your past mammograms and other breast tests or treatments  · If your breasts are tender before your monthly period, do not have a mammogram during this time  Schedule your mammogram to be done 1 week after your period ends  How is a mammogram done? A top view and a side view x-ray are usually done for each breast  Tell caregivers if you have breast implants or breast problems before you have your mammogram  You may need extra x-rays of each breast   · You will be given a hospital gown  Take off your clothes from the waist up  Wear the hospital gown so that it opens in the front  · You will sit or stand next to a small x-ray machine  The caregiver will help you place one of your breasts on the x-ray plate  Your arm and breast will be moved until your breast is in the correct position  · Your breast will be gently pressed between 2 plastic plates for a few seconds while the x-ray is taken  This may be uncomfortable  · You will be asked to hold your breath while the x-ray is taken  Another x-ray will be taken of the same breast after the position of the x-ray machine has been changed  · Your other breast will be x-rayed the same way    What happens after my mammogram?  Your breasts may feel tender for a short while after the mammogram  You may resume your regular activities  Ask your caregiver when you should receive the results of your mammogram   What are the risks of a mammogram?  You will be exposed to a small amount of radiation  Some breast cancers may not show up on mammograms  When should I contact my caregiver? Contact your caregiver if:  · You cannot make your appointment on time  · You do not receive your results when expected  · You have questions or concerns about the mammogram   CARE AGREEMENT:   You have the right to help plan your care  Learn about your health condition and how it may be treated  Discuss treatment options with your caregivers to decide what care you want to receive  You always have the right to refuse treatment  The above information is an  only  It is not intended as medical advice for individual conditions or treatments  Talk to your doctor, nurse or pharmacist before following any medical regimen to see if it is safe and effective for you  © 2014 [address] is for End User's use only and may not be sold, redistributed or otherwise used for commercial purposes  All illustrations and images included in CareNotes® are the copyrighted property of [employer]  or [facility]  Breast Self Exam for Women   WHAT YOU NEED TO KNOW:   What is a breast self-exam (BSE)? A BSE is a way to check your breasts for lumps and other changes  Regular BSEs can help you know how your breasts normally look and feel  Most breast lumps or changes are not cancer, but you should always have them checked by a healthcare provider  Your healthcare provider can also watch you do a BSE and can tell you if you are doing your BSE correctly  Why should I do a BSE? Breast cancer is the most common type of cancer in women  Even if you have mammograms, you may still want to do a BSE regularly   If you know how your breasts normally feel and look, it may help you know when to contact your healthcare provider  Mammograms can miss some cancers  You may find a lump during a BSE that did not show up on your mammogram   When should I do a BSE? Harjit your calendar to help you remember to do BSE on a regular schedule  One easy way to remember to do a BSE is to do the exam on the same day of each month  If you have periods, you may want to do your BSE 1 week after your period ends  This is the time when your breasts may be the least swollen, lumpy, or tender  You can do regular BSEs even if you are breastfeeding or have breast implants  How should I do a BSE? · Look at your breasts in a mirror  Look at the size and shape of each breast and nipple  Check for swelling, lumps, dimpling, scaly skin, or other skin changes  Look for nipple changes, such as a nipple that is painful or beginning to pull inward  Gently squeeze both nipples and check to see if fluid (that is not breast milk) comes out of them  If you find any of these or other breast changes, contact your healthcare provider  Check your breasts while you sit or  the following 3 positions:    [facility] your arms down at your sides  ¨ Raise your hands and join them behind your head  ¨ Put firm pressure with your hands on your hips  Bend slightly forward while you look at your breasts in the mirror  · Lie down and feel your breasts  When you lie down, your breast tissue spreads out evenly over your chest  This makes it easier for you to feel for lumps and anything that may not be normal for your breasts  Do a BSE on one breast at a time  ¨ Place a small pillow or towel under your left shoulder  Put your left arm behind your head  ¨ Use the 3 middle fingers of your right hand  Use your fingertip pads, on the top of your fingers  Your fingertip pad is the most sensitive part of your finger  ¨ Use small circles to feel your breast tissue  Use your fingertip pads to make dime-sized, overlapping circles on your breast and armpits  Use light, medium, and firm pressure  First, press lightly  Second, press with medium pressure to feel a little deeper into the breast  Last, use firm pressure to feel deep within your breast     ¨ Examine your entire breast area  Examine the breast area from above the breast to below the breast where you feel only ribs  Make small circles with your fingertips, starting in the middle of your armpit  Make circles going up and down the breast area  Continue toward your breast and all the way across it  Examine the area from your armpit all the way over to the middle of your chest (breastbone)  Stop at the middle of your chest     ¨ Move the pillow or towel to your right shoulder, and put your right arm behind your head  Use the 3 fingertip pads of your left hand, and repeat the above steps to do a BSE on your right breast        What else can I do to check for breast problems or cancer? Some experts suggest that women 36years of age or older should have a mammogram every year  Other experts suggest that women between the ages of 48and 76years old should have a mammogram every 2 years  Talk to your healthcare provider about when you should have a mammogram   When should I contact my healthcare provider? · You find any lumps or changes in your breasts  · You have breast pain or fluid coming from your nipples  · You have questions or concerns or concerns about your condition or care  CARE AGREEMENT:   You have the right to help plan your care  Learn about your health condition and how it may be treated  Discuss treatment options with your caregivers to decide what care you want to receive  You always have the right to refuse treatment  The above information is an  only  It is not intended as medical advice for individual conditions or treatments  Talk to your doctor, nurse or pharmacist before following any medical regimen to see if it is safe and effective for you    © 2017 [facility] [address] [place] [address] is for End User's use only and may not be sold, redistributed or otherwise used for commercial purposes  All illustrations and images included in CareNotes® are the copyrighted property of [employer] M [employer] Inc  or Isiah Nava  Human Papillomavirus Vaccine (By injection)   Human Papillomavirus Vaccine (HUE-man pap-ah-KARLO-slime-VYE-sarah VAX-een)  Helps prevent genital warts and cancer of the anus, cervix, vagina, or vulva, which may be caused by human papillomavirus (HPV)  Brand Name(s): Cervarix, Gardasil, Gardasil 9   There may be other brand names for this medicine  When This Medicine Should Not Be Used: This vaccine is not right for everyone  You should not receive it if you had an allergic reaction to human papillomavirus vaccine or to yeast   How to Use This Medicine:   Injectable  · Your doctor will prescribe your exact dose and tell you how often it should be given  This medicine is given as a shot into one of your muscles  · A nurse or other health provider will give you this medicine  · This vaccine must be given as 2 or 3 doses based on the brand  · Read and follow the patient instructions that come with this medicine  Talk to your doctor or pharmacist if you have any questions  · Missed dose: This vaccine needs to be given on a fixed schedule  If you miss your scheduled shot, call your doctor to make another appointment as soon as possible  Drugs and Foods to Avoid:   Ask your doctor or pharmacist before using any other medicine, including over-the-counter medicines, vitamins, and herbal products  · Some medicines can affect how this vaccine works  Tell your doctor if you are using any treatment that weakens the immune system, such as cancer medicine, radiation treatment, or a steroid  Warnings While Using This Medicine:   · Tell your doctor if you are pregnant or breastfeeding, or if you have a weak immune system or are allergic to latex    · This vaccine will not protect you against sexually transmitted diseases that are not caused by HPV  · You still need to see your doctor for routine screening tests for anal or cervical cancer even after you receive this vaccine  · You may feel faint, lightheaded, or dizzy right after you receive this vaccine  Your doctor may ask you to wait 15 minutes before standing  Possible Side Effects While Using This Medicine:   Call your doctor right away if you notice any of these side effects:  · Allergic reaction: Itching or hives, swelling in your face or hands, swelling or tingling in your mouth or throat, chest tightness, trouble breathing  · Lightheadedness, dizziness, or fainting  If you notice these less serious side effects, talk with your doctor:   · Headache, tiredness  · Mild fever  · Pain, redness, itching, or swelling where the shot is given  If you notice other side effects that you think are caused by this medicine, tell your doctor  Call your doctor for medical advice about side effects  You may report side effects to FDA at 3-766-FDA-8681  © 2017 [address] Information is for End User's use only and may not be sold, redistributed or otherwise used for commercial purposes  The above information is an  only  It is not intended as medical advice for individual conditions or treatments  Talk to your doctor, nurse or pharmacist before following any medical regimen to see if it is safe and effective for you

## 2019-10-29 ENCOUNTER — ANNUAL EXAM (OUTPATIENT)
Dept: OBGYN CLINIC | Facility: CLINIC | Age: 44
End: 2019-10-29

## 2019-10-29 ENCOUNTER — APPOINTMENT (OUTPATIENT)
Dept: LAB | Facility: HOSPITAL | Age: 44
End: 2019-10-29
Payer: COMMERCIAL

## 2019-10-29 VITALS
DIASTOLIC BLOOD PRESSURE: 55 MMHG | HEIGHT: 60 IN | SYSTOLIC BLOOD PRESSURE: 106 MMHG | HEART RATE: 73 BPM | WEIGHT: 145 LBS | BODY MASS INDEX: 28.47 KG/M2

## 2019-10-29 DIAGNOSIS — Z72.51 HIGH RISK HETEROSEXUAL BEHAVIOR: ICD-10-CM

## 2019-10-29 DIAGNOSIS — Z01.419 WOMEN'S ANNUAL ROUTINE GYNECOLOGICAL EXAMINATION: Primary | ICD-10-CM

## 2019-10-29 DIAGNOSIS — Z12.39 BREAST CANCER SCREENING: ICD-10-CM

## 2019-10-29 PROBLEM — Z3A.40 40 WEEKS GESTATION OF PREGNANCY: Status: RESOLVED | Noted: 2017-02-04 | Resolved: 2019-10-29

## 2019-10-29 PROBLEM — H65.91 RIGHT NON-SUPPURATIVE OTITIS MEDIA: Status: RESOLVED | Noted: 2018-10-23 | Resolved: 2019-10-29

## 2019-10-29 PROBLEM — M54.42 ACUTE BACK PAIN WITH SCIATICA, LEFT: Status: RESOLVED | Noted: 2018-10-23 | Resolved: 2019-10-29

## 2019-10-29 PROBLEM — J02.9 ACUTE PHARYNGITIS: Status: RESOLVED | Noted: 2018-10-23 | Resolved: 2019-10-29

## 2019-10-29 LAB
HBV SURFACE AG SER QL: NORMAL
RPR SER QL: NORMAL

## 2019-10-29 PROCEDURE — 87389 HIV-1 AG W/HIV-1&-2 AB AG IA: CPT

## 2019-10-29 PROCEDURE — 99396 PREV VISIT EST AGE 40-64: CPT | Performed by: NURSE PRACTITIONER

## 2019-10-29 PROCEDURE — 3725F SCREEN DEPRESSION PERFORMED: CPT | Performed by: NURSE PRACTITIONER

## 2019-10-29 PROCEDURE — 36415 COLL VENOUS BLD VENIPUNCTURE: CPT

## 2019-10-29 PROCEDURE — 87340 HEPATITIS B SURFACE AG IA: CPT

## 2019-10-29 PROCEDURE — 87591 N.GONORRHOEAE DNA AMP PROB: CPT | Performed by: NURSE PRACTITIONER

## 2019-10-29 PROCEDURE — 87491 CHLMYD TRACH DNA AMP PROBE: CPT | Performed by: NURSE PRACTITIONER

## 2019-10-29 PROCEDURE — 86592 SYPHILIS TEST NON-TREP QUAL: CPT

## 2019-10-29 RX ORDER — ACETAMINOPHEN, ASPIRIN AND CAFFEINE 250; 250; 65 MG/1; MG/1; MG/1
1 TABLET, FILM COATED ORAL EVERY 6 HOURS PRN
COMMUNITY

## 2019-10-29 RX ORDER — OMEPRAZOLE 20 MG/1
20 CAPSULE, DELAYED RELEASE ORAL DAILY
Refills: 5 | COMMUNITY
Start: 2019-10-23 | End: 2021-03-05 | Stop reason: SDUPTHER

## 2019-10-30 ENCOUNTER — TELEPHONE (OUTPATIENT)
Dept: OBGYN CLINIC | Facility: CLINIC | Age: 44
End: 2019-10-30

## 2019-10-30 LAB
C TRACH DNA SPEC QL NAA+PROBE: NEGATIVE
HIV 1+2 AB+HIV1 P24 AG SERPL QL IA: NORMAL
N GONORRHOEA DNA SPEC QL NAA+PROBE: NEGATIVE

## 2019-10-31 ENCOUNTER — APPOINTMENT (OUTPATIENT)
Dept: LAB | Facility: CLINIC | Age: 44
End: 2019-10-31
Payer: COMMERCIAL

## 2019-10-31 ENCOUNTER — OFFICE VISIT (OUTPATIENT)
Dept: INTERNAL MEDICINE CLINIC | Facility: CLINIC | Age: 44
End: 2019-10-31

## 2019-10-31 VITALS
SYSTOLIC BLOOD PRESSURE: 110 MMHG | BODY MASS INDEX: 29.8 KG/M2 | WEIGHT: 141.98 LBS | DIASTOLIC BLOOD PRESSURE: 60 MMHG | HEIGHT: 58 IN | TEMPERATURE: 98.2 F | HEART RATE: 70 BPM

## 2019-10-31 DIAGNOSIS — Z23 NEED FOR IMMUNIZATION AGAINST INFLUENZA: ICD-10-CM

## 2019-10-31 DIAGNOSIS — E66.3 OVERWEIGHT (BMI 25.0-29.9): ICD-10-CM

## 2019-10-31 DIAGNOSIS — R20.2 NUMBNESS AND TINGLING IN BOTH HANDS: Primary | ICD-10-CM

## 2019-10-31 DIAGNOSIS — R73.09 ELEVATED GLUCOSE: ICD-10-CM

## 2019-10-31 DIAGNOSIS — R20.0 NUMBNESS AND TINGLING IN BOTH HANDS: Primary | ICD-10-CM

## 2019-10-31 DIAGNOSIS — M43.6 NECK STIFFNESS: ICD-10-CM

## 2019-10-31 DIAGNOSIS — R10.13 EPIGASTRIC PAIN: ICD-10-CM

## 2019-10-31 LAB
CHOLEST SERPL-MCNC: 169 MG/DL (ref 50–200)
EST. AVERAGE GLUCOSE BLD GHB EST-MCNC: 108 MG/DL
HBA1C MFR BLD: 5.4 % (ref 4.2–6.3)
HDLC SERPL-MCNC: 44 MG/DL
LDLC SERPL CALC-MCNC: 104 MG/DL (ref 0–100)
NONHDLC SERPL-MCNC: 125 MG/DL
TRIGL SERPL-MCNC: 104 MG/DL

## 2019-10-31 PROCEDURE — 99203 OFFICE O/P NEW LOW 30 MIN: CPT | Performed by: PHYSICIAN ASSISTANT

## 2019-10-31 PROCEDURE — 83036 HEMOGLOBIN GLYCOSYLATED A1C: CPT

## 2019-10-31 PROCEDURE — 90471 IMMUNIZATION ADMIN: CPT | Performed by: PHYSICIAN ASSISTANT

## 2019-10-31 PROCEDURE — 3008F BODY MASS INDEX DOCD: CPT | Performed by: PHYSICIAN ASSISTANT

## 2019-10-31 PROCEDURE — 1036F TOBACCO NON-USER: CPT | Performed by: PHYSICIAN ASSISTANT

## 2019-10-31 PROCEDURE — 90686 IIV4 VACC NO PRSV 0.5 ML IM: CPT | Performed by: PHYSICIAN ASSISTANT

## 2019-10-31 PROCEDURE — 80061 LIPID PANEL: CPT

## 2019-10-31 PROCEDURE — 36415 COLL VENOUS BLD VENIPUNCTURE: CPT

## 2019-10-31 RX ORDER — METHOCARBAMOL 750 MG/1
750 TABLET, FILM COATED ORAL 3 TIMES DAILY PRN
Qty: 30 TABLET | Refills: 0 | Status: SHIPPED | OUTPATIENT
Start: 2019-10-31 | End: 2021-04-14

## 2019-10-31 NOTE — PATIENT INSTRUCTIONS
Please start stretching the neck 10-15min 3 times a day    Tylenol, tylenol extra strength or tylenol arthritis    Robaxin muscle relaxer 3 times a day as needed or at night before bed - may make drowsy! Heating pad to relax muscles  Consider wrist braces to support wrists and prevent bending at night    We will try this for another 3-4 weeks and call if no better, or follow up  Will consider xray of neck  Low Fat Diet   AMBULATORY CARE:   A low-fat diet  is an eating plan that is low in total fat, unhealthy fat, and cholesterol  You may need to follow a low-fat diet if you have trouble digesting or absorbing fat  You may also need to follow this diet if you have high cholesterol  You can also lower your cholesterol by increasing the amount of fiber in your diet  Soluble fiber is a type of fiber that helps to decrease cholesterol levels  Different types of fat in food:   · Limit unhealthy fats  A diet that is high in cholesterol, saturated fat, and trans fat may cause unhealthy cholesterol levels  Unhealthy cholesterol levels increase your risk of heart disease  ¨ Cholesterol:  Limit intake of cholesterol to less than 200 mg per day  Cholesterol is found in meat, eggs, and dairy  ¨ Saturated fat:  Limit saturated fat to less than 7% of your total daily calories  Ask your dietitian how many calories you need each day  Saturated fat is found in butter, cheese, ice cream, whole milk, and palm oil  Saturated fat is also found in meat, such as beef, pork, chicken skin, and processed meats  Processed meats include sausage, hot dogs, and bologna  ¨ Trans fat:  Avoid trans fat as much as possible  Trans fat is used in fried and baked foods  Foods that say trans fat free on the label may still have up to 0 5 grams of trans fat per serving  · Include healthy fats  Replace foods that are high in saturated and trans fat with foods high in healthy fats   This may help to decrease high cholesterol levels  ¨ Monounsaturated fats: These are found in avocados, nuts, and vegetable oils, such as olive, canola, and sunflower oil  ¨ Polyunsaturated fats: These can be found in vegetable oils, such as soybean or corn oil  Omega-3 fats can help to decrease the risk of heart disease  Omega-3 fats are found in fish, such as salmon, herring, trout, and tuna  Omega-3 fats can also be found in plant foods, such as walnuts, flaxseed, soybeans, and canola oil    Foods to limit or avoid:   · Grains:      ¨ Snacks that are made with partially hydrogenated oils, such as chips, regular crackers, and butter-flavored popcorn    ¨ High-fat baked goods, such as biscuits, croissants, doughnuts, pies, cookies, and pastries    · Dairy:      ¨ Whole milk, 2% milk, and yogurt and ice cream made with whole milk    ¨ Half and half creamer, heavy cream, and whipping cream    ¨ Cheese, cream cheese, and sour cream    · Meats and proteins:      ¨ High-fat cuts of meat (T-bone steak, regular hamburger, and ribs)    ¨ Fried meat, poultry (turkey and chicken), and fish    ¨ Poultry (chicken and turkey) with skin    ¨ Cold cuts (salami or bologna), hot dogs, schaefer, and sausage    ¨ Whole eggs and egg yolks    · Vegetables and fruits with added fat:      ¨ Fried vegetables or vegetables in butter or high-fat sauces, such as cream or cheese sauces    ¨ Fried fruit or fruit served with butter or cream    · Fats:      ¨ Butter, stick margarine, and shortening    ¨ Coconut, palm oil, and palm kernel oil  Foods to include:   · Grains:      ¨ Whole-grain breads, cereals, pasta, and brown rice    ¨ Low-fat crackers and pretzels    · Vegetables and fruits:      ¨ Fresh, frozen, or canned vegetables (no salt or low-sodium)    ¨ Fresh, frozen, dried, or canned fruit (canned in light syrup or fruit juice)    ¨ Avocado    · Low-fat dairy products:      ¨ Nonfat (skim) or 1% milk    ¨ Nonfat or low-fat cheese, yogurt, and cottage cheese    · Meats and proteins:      ¨ Chicken or turkey with no skin    ¨ Baked or broiled fish    ¨ Lean beef and pork (loin, round, extra lean hamburger)    ¨ Beans and peas, unsalted nuts, soy products    ¨ Egg whites and substitutes    ¨ Seeds and nuts    · Fats:      ¨ Unsaturated oil, such as canola, olive, peanut, soybean, or sunflower oil    ¨ Soft or liquid margarine and vegetable oil spread    ¨ Low-fat salad dressing  Other ways to decrease fat:   · Read food labels before you buy foods  Choose foods that have less than 30% of calories from fat  Choose low-fat or fat-free dairy products  Remember that fat free does not mean calorie free  These foods still contain calories, and too many calories can lead to weight gain  · Trim fat from meat and avoid fried food  Trim all visible fat from meat before you cook it  Remove the skin from poultry  Do not garcia meat, fish, or poultry  Bake, roast, boil, or broil these foods instead  Avoid fried foods  Eat a baked potato instead of [place] fries  Steam vegetables instead of sautéing them in butter  · Add less fat to foods  Use imitation schaefer bits on salads and baked potatoes instead of regular schaefer bits  Use fat-free or low-fat salad dressings instead of regular dressings  Use low-fat or nonfat butter-flavored topping instead of regular butter or margarine on popcorn and other foods  Ways to decrease fat in recipes:  Replace high-fat ingredients with low-fat or nonfat ones  This may cause baked goods to be drier than usual  You may need to use nonfat cooking spray on pans to prevent food from sticking  You also may need to change the amount of other ingredients, such as water, in the recipe  Try the following:  · Use low-fat or light margarine instead of regular margarine or shortening  · Use lean ground turkey breast or chicken, or lean ground beef (less than 5% fat) instead of hamburger       · Add 1 teaspoon of canola oil to 8 ounces of skim milk instead of using cream or half and half  · Use grated zucchini, carrots, or apples in breads instead of coconut  · Use blenderized, low-fat cottage cheese, plain tofu, or low-fat ricotta cheese instead of cream cheese  · Use 1 egg white and 1 teaspoon of canola oil, or use ¼ cup (2 ounces) of fat-free egg substitute instead of a whole egg  · Replace half of the oil that is called for in a recipe with applesauce when you bake  Use 3 tablespoons of cocoa powder and 1 tablespoon of canola oil instead of a square of baking chocolate  How to increase fiber:  Eat enough high-fiber foods to get 20 to 30 grams of fiber every day  Slowly increase your fiber intake to avoid stomach cramps, gas, and other problems  · Eat 3 ounces of whole-grain foods each day  An ounce is about 1 slice of bread  Eat whole-grain breads, such as whole-wheat bread  Whole wheat, whole-wheat flour, or other whole grains should be listed as the first ingredient on the food label  Replace white flour with whole-grain flour or use half of each in recipes  Whole-grain flour is heavier than white flour, so you may have to add more yeast or baking powder  · Eat a high-fiber cereal for breakfast   Oatmeal is a good source of soluble fiber  Look for cereals that have bran or fiber in the name  Choose whole-grain products, such as brown rice, barley, and whole-wheat pasta  · Eat more beans, peas, and lentils  For example, add beans to soups or salads  Eat at least 5 cups of fruits and vegetables each day  Eat fruits and vegetables with the peel because the peel is high in fiber  © 2017 [address]  Information is for End User's use only and may not be sold, redistributed or otherwise used for commercial purposes  All illustrations and images included in CareNotes® are the copyrighted property of [employer] M [employer]  or Isiah Nava  The above information is an  only   It is not intended as medical advice for individual conditions or treatments  Talk to your doctor, nurse or pharmacist before following any medical regimen to see if it is safe and effective for you

## 2019-12-20 ENCOUNTER — HOSPITAL ENCOUNTER (OUTPATIENT)
Dept: RADIOLOGY | Age: 44
Discharge: HOME | End: 2019-12-20
Payer: COMMERCIAL

## 2019-12-20 VITALS — HEIGHT: 58 IN | WEIGHT: 141 LBS | BODY MASS INDEX: 29.6 KG/M2

## 2019-12-20 DIAGNOSIS — Z12.39 BREAST CANCER SCREENING: ICD-10-CM

## 2019-12-20 PROCEDURE — 77067 SCR MAMMO BI INCL CAD: CPT

## 2019-12-20 PROCEDURE — 77063 BREAST TOMOSYNTHESIS BI: CPT

## 2019-12-31 ENCOUNTER — HOSPITAL ENCOUNTER (OUTPATIENT)
Dept: ULTRASOUND IMAGING | Facility: CLINIC | Age: 44
Discharge: HOME | End: 2019-12-31
Payer: COMMERCIAL

## 2019-12-31 ENCOUNTER — HOSPITAL ENCOUNTER (OUTPATIENT)
Dept: MAMMOGRAPHY | Facility: CLINIC | Age: 44
Discharge: HOME | End: 2019-12-31
Payer: COMMERCIAL

## 2019-12-31 VITALS — BODY MASS INDEX: 29.6 KG/M2 | HEIGHT: 58 IN | WEIGHT: 141 LBS

## 2019-12-31 DIAGNOSIS — R92.8 ABNORMAL MAMMOGRAM: ICD-10-CM

## 2019-12-31 PROCEDURE — 77065 DX MAMMO INCL CAD UNI: CPT

## 2019-12-31 PROCEDURE — G0279 TOMOSYNTHESIS, MAMMO: HCPCS

## 2019-12-31 PROCEDURE — 76642 ULTRASOUND BREAST LIMITED: CPT

## 2020-01-02 ENCOUNTER — TRANSCRIBE ORDERS (OUTPATIENT)
Dept: ADMINISTRATIVE | Facility: HOSPITAL | Age: 45
End: 2020-01-02

## 2020-01-02 DIAGNOSIS — R92.8 ABNORMAL MAMMOGRAM: Primary | ICD-10-CM

## 2020-07-01 ENCOUNTER — HOSPITAL ENCOUNTER (OUTPATIENT)
Dept: MAMMOGRAPHY | Facility: CLINIC | Age: 45
Discharge: HOME | End: 2020-07-01
Payer: COMMERCIAL

## 2020-07-01 ENCOUNTER — HOSPITAL ENCOUNTER (OUTPATIENT)
Dept: ULTRASOUND IMAGING | Facility: CLINIC | Age: 45
Discharge: HOME | End: 2020-07-01
Payer: COMMERCIAL

## 2020-07-01 DIAGNOSIS — R92.8 ABNORMAL MAMMOGRAM OF BOTH BREASTS: Primary | ICD-10-CM

## 2020-07-01 DIAGNOSIS — R92.8 ABNORMAL MAMMOGRAM: ICD-10-CM

## 2020-07-01 PROCEDURE — 76642 ULTRASOUND BREAST LIMITED: CPT

## 2020-07-01 PROCEDURE — 77065 DX MAMMO INCL CAD UNI: CPT

## 2020-07-01 PROCEDURE — G0279 TOMOSYNTHESIS, MAMMO: HCPCS

## 2020-08-07 ENCOUNTER — OFFICE VISIT (OUTPATIENT)
Dept: URGENT CARE | Age: 45
End: 2020-08-07
Payer: COMMERCIAL

## 2020-08-07 VITALS
OXYGEN SATURATION: 98 % | HEART RATE: 77 BPM | BODY MASS INDEX: 32.4 KG/M2 | DIASTOLIC BLOOD PRESSURE: 76 MMHG | TEMPERATURE: 97.3 F | RESPIRATION RATE: 16 BRPM | WEIGHT: 140 LBS | HEIGHT: 55 IN | SYSTOLIC BLOOD PRESSURE: 94 MMHG

## 2020-08-07 DIAGNOSIS — N39.0 URINARY TRACT INFECTION WITH HEMATURIA, SITE UNSPECIFIED: Primary | ICD-10-CM

## 2020-08-07 DIAGNOSIS — R31.9 URINARY TRACT INFECTION WITH HEMATURIA, SITE UNSPECIFIED: Primary | ICD-10-CM

## 2020-08-07 LAB
SL AMB  POCT GLUCOSE, UA: ABNORMAL
SL AMB LEUKOCYTE ESTERASE,UA: ABNORMAL
SL AMB POCT BILIRUBIN,UA: ABNORMAL
SL AMB POCT BLOOD,UA: ABNORMAL
SL AMB POCT CLARITY,UA: ABNORMAL
SL AMB POCT COLOR,UA: YELLOW
SL AMB POCT KETONES,UA: ABNORMAL
SL AMB POCT NITRITE,UA: ABNORMAL
SL AMB POCT PH,UA: 5
SL AMB POCT SPECIFIC GRAVITY,UA: 1.02
SL AMB POCT URINE PROTEIN: ABNORMAL
SL AMB POCT UROBILINOGEN: 0.2

## 2020-08-07 PROCEDURE — 81002 URINALYSIS NONAUTO W/O SCOPE: CPT | Performed by: PHYSICIAN ASSISTANT

## 2020-08-07 PROCEDURE — S9083 URGENT CARE CENTER GLOBAL: HCPCS | Performed by: PHYSICIAN ASSISTANT

## 2020-08-07 PROCEDURE — 87086 URINE CULTURE/COLONY COUNT: CPT | Performed by: PHYSICIAN ASSISTANT

## 2020-08-07 PROCEDURE — G0382 LEV 3 HOSP TYPE B ED VISIT: HCPCS | Performed by: PHYSICIAN ASSISTANT

## 2020-08-07 RX ORDER — CIPROFLOXACIN 500 MG/1
500 TABLET, FILM COATED ORAL EVERY 12 HOURS SCHEDULED
Qty: 14 TABLET | Refills: 0 | Status: SHIPPED | OUTPATIENT
Start: 2020-08-07 | End: 2020-08-14

## 2020-08-08 LAB — BACTERIA UR CULT: NORMAL

## 2020-08-08 NOTE — PATIENT INSTRUCTIONS
1  Drink plenty fluids  2   Take probiotics [i e  Yogurt, Acidophilus, Florastor (liquid)] daily  3   Over-the-counter medications as needed for symptomatic care  4    Advance activities as tolerated  5    Follow-up with your primary care physician in 3-4 days  6   Go to emergency room if symptoms are worsening  Peripheral Line Insertion

## 2020-08-08 NOTE — PROGRESS NOTES
[facility]        NAME: Lary Israel is a 41 y o  female  : 1975    MRN: [id number]  DATE: 2020  TIME: 9:07 PM    Assessment and Plan   Urinary tract infection with hematuria, site unspecified [N39 0, R31 9]  1  Urinary tract infection with hematuria, site unspecified  ciprofloxacin (CIPRO) 500 mg tablet         Patient Instructions       Follow up with PCP in 3-5 days  Proceed to  ER if symptoms worsen  Chief Complaint     Chief Complaint   Patient presents with    Possible UTI     started last week with bladder pain and burning urination    concentrated and foul smelling urin    started to resolve with increased hydration    now worsening         History of Present Illness       Patient here for evaluation of a possible urinary tract infection  Patient's symptoms started last week  She has been drinking plenty fluids but symptoms have been getting worse  She denies any fevers, chills, body aches  Review of Systems   Review of Systems   Constitutional: Negative  Genitourinary: Positive for dysuria, frequency, hematuria and urgency  Negative for decreased urine volume, difficulty urinating and flank pain  Musculoskeletal: Negative for back pain           Current Medications       Current Outpatient Medications:     aspirin-acetaminophen-caffeine (EXCEDRIN MIGRAINE) 250-250-65 MG per tablet, Take 1 tablet by mouth every 6 (six) hours as needed for headaches, Disp: , Rfl:     levonorgestrel (MIRENA) 20 MCG/24HR IUD, 1 each by Intrauterine route once, Disp: , Rfl:     methocarbamol (ROBAXIN) 750 mg tablet, Take 1 tablet (750 mg total) by mouth 3 (three) times a day as needed for muscle spasms, Disp: 30 tablet, Rfl: 0    omeprazole (PriLOSEC) 20 mg delayed release capsule, Take 20 mg by mouth daily, Disp: , Rfl: 5    ciprofloxacin (CIPRO) 500 mg tablet, Take 1 tablet (500 mg total) by mouth every 12 (twelve) hours for 7 days, Disp: 14 tablet, Rfl: 0    Current Allergies     Allergies as of 08/07/2020 - Reviewed 08/07/2020   Allergen Reaction Noted    Other Swelling 02/04/2017            The following portions of the patient's history were reviewed and updated as appropriate: allergies, current medications, past family history, past medical history, past social history, past surgical history and problem list      Past Medical History:   Diagnosis Date    GERD (gastroesophageal reflux disease)     Migraine     takes excederine migraine       Past Surgical History:   Procedure Laterality Date    DENTAL SURGERY         Family History   Problem Relation Age of Onset    Breast cancer Mother         58 y/o    Mony Castelan BRCA1 Negative Mother     Diabetes Father     Hypertension Father     No Known Problems Brother     No Known Problems Daughter     No Known Problems Son     Breast cancer Maternal Grandmother 46    Lung cancer Maternal Grandmother 59    Alzheimer's disease Maternal Grandfather     Heart disease Paternal Grandmother     No Known Problems Paternal Grandfather     No Known Problems Brother     No Known Problems Brother     No Known Problems Son     Lung cancer Maternal Uncle 48    Throat cancer Cousin 39    BRCA1 Positive Cousin     Kidney cancer Cousin 41         Medications have been verified  Objective   BP 94/76   Pulse 77   Temp (!) 97 3 °F (36 3 °C)   Resp 16   Ht 4' 7" (1 397 m)   Wt 63 5 kg (140 lb)   SpO2 98%   BMI 32 54 kg/m²        Physical Exam     Physical Exam  Vitals signs and nursing note reviewed  Constitutional:       General: She is not in acute distress  Appearance: She is well-developed  She is not diaphoretic  HENT:      Head: Normocephalic and atraumatic  Abdominal:      General: Abdomen is flat  Bowel sounds are normal       Tenderness: There is no abdominal tenderness  There is no right CVA tenderness or left CVA tenderness  Skin:     General: Skin is warm and dry  Findings: No rash  Neurological:      Mental Status: She is alert and oriented to person, place, and time  Psychiatric:         Behavior: Behavior normal          Thought Content:  Thought content normal          Judgment: Judgment normal

## 2020-11-04 ENCOUNTER — OFFICE VISIT (OUTPATIENT)
Dept: INTERNAL MEDICINE CLINIC | Facility: CLINIC | Age: 45
End: 2020-11-04

## 2020-11-04 VITALS
WEIGHT: 139.8 LBS | OXYGEN SATURATION: 98 % | BODY MASS INDEX: 28.18 KG/M2 | SYSTOLIC BLOOD PRESSURE: 132 MMHG | TEMPERATURE: 98.7 F | HEART RATE: 85 BPM | DIASTOLIC BLOOD PRESSURE: 80 MMHG | HEIGHT: 59 IN

## 2020-11-04 DIAGNOSIS — M72.2 PLANTAR FASCIITIS, LEFT: ICD-10-CM

## 2020-11-04 DIAGNOSIS — Z13.220 SCREENING, LIPID: ICD-10-CM

## 2020-11-04 DIAGNOSIS — Z13.0 SCREENING FOR DEFICIENCY ANEMIA: ICD-10-CM

## 2020-11-04 DIAGNOSIS — E66.3 OVERWEIGHT (BMI 25.0-29.9): ICD-10-CM

## 2020-11-04 DIAGNOSIS — Z23 NEED FOR IMMUNIZATION AGAINST INFLUENZA: ICD-10-CM

## 2020-11-04 DIAGNOSIS — R23.2 HOT FLASHES: ICD-10-CM

## 2020-11-04 DIAGNOSIS — R73.09 ELEVATED GLUCOSE: ICD-10-CM

## 2020-11-04 DIAGNOSIS — Z00.00 ROUTINE ADULT HEALTH MAINTENANCE: Primary | ICD-10-CM

## 2020-11-04 PROBLEM — R10.13 EPIGASTRIC PAIN: Status: RESOLVED | Noted: 2019-10-31 | Resolved: 2020-11-04

## 2020-11-04 PROBLEM — N39.0 URINARY TRACT INFECTION WITH HEMATURIA: Status: RESOLVED | Noted: 2020-08-07 | Resolved: 2020-11-04

## 2020-11-04 PROBLEM — R31.9 URINARY TRACT INFECTION WITH HEMATURIA: Status: RESOLVED | Noted: 2020-08-07 | Resolved: 2020-11-04

## 2020-11-04 PROCEDURE — 1036F TOBACCO NON-USER: CPT | Performed by: PHYSICIAN ASSISTANT

## 2020-11-04 PROCEDURE — 99396 PREV VISIT EST AGE 40-64: CPT | Performed by: PHYSICIAN ASSISTANT

## 2020-11-04 PROCEDURE — 90471 IMMUNIZATION ADMIN: CPT | Performed by: PHYSICIAN ASSISTANT

## 2020-11-04 PROCEDURE — 3008F BODY MASS INDEX DOCD: CPT | Performed by: PHYSICIAN ASSISTANT

## 2020-11-04 PROCEDURE — 90686 IIV4 VACC NO PRSV 0.5 ML IM: CPT | Performed by: PHYSICIAN ASSISTANT

## 2020-11-04 RX ORDER — DICLOFENAC SODIUM 75 MG/1
75 TABLET, DELAYED RELEASE ORAL 2 TIMES DAILY
Qty: 20 TABLET | Refills: 0 | Status: SHIPPED | OUTPATIENT
Start: 2020-11-04 | End: 2021-01-07 | Stop reason: ALTCHOICE

## 2021-01-05 ENCOUNTER — HOSPITAL ENCOUNTER (OUTPATIENT)
Dept: MAMMOGRAPHY | Facility: CLINIC | Age: 46
Discharge: HOME | End: 2021-01-05
Payer: COMMERCIAL

## 2021-01-05 ENCOUNTER — TELEPHONE (OUTPATIENT)
Dept: OBGYN CLINIC | Facility: CLINIC | Age: 46
End: 2021-01-05

## 2021-01-05 VITALS — BODY MASS INDEX: 28.02 KG/M2 | HEIGHT: 59 IN | WEIGHT: 139 LBS

## 2021-01-05 DIAGNOSIS — R92.8 ABNORMAL MAMMOGRAM OF BOTH BREASTS: ICD-10-CM

## 2021-01-05 PROCEDURE — G0279 TOMOSYNTHESIS, MAMMO: HCPCS

## 2021-01-05 PROCEDURE — 77066 DX MAMMO INCL CAD BI: CPT

## 2021-01-07 ENCOUNTER — ANNUAL EXAM (OUTPATIENT)
Dept: OBGYN CLINIC | Facility: CLINIC | Age: 46
End: 2021-01-07

## 2021-01-07 ENCOUNTER — LAB (OUTPATIENT)
Dept: LAB | Facility: HOSPITAL | Age: 46
End: 2021-01-07
Payer: COMMERCIAL

## 2021-01-07 VITALS
BODY MASS INDEX: 28.39 KG/M2 | WEIGHT: 140.8 LBS | HEART RATE: 74 BPM | SYSTOLIC BLOOD PRESSURE: 111 MMHG | DIASTOLIC BLOOD PRESSURE: 67 MMHG | HEIGHT: 59 IN

## 2021-01-07 DIAGNOSIS — Z13.29 SCREENING FOR THYROID DISORDER: ICD-10-CM

## 2021-01-07 DIAGNOSIS — N93.9 ABNORMAL UTERINE BLEEDING: ICD-10-CM

## 2021-01-07 DIAGNOSIS — Z01.419 WOMEN'S ANNUAL ROUTINE GYNECOLOGICAL EXAMINATION: Primary | ICD-10-CM

## 2021-01-07 LAB — TSH SERPL DL<=0.05 MIU/L-ACNC: 0.99 UIU/ML (ref 0.36–3.74)

## 2021-01-07 PROCEDURE — 84443 ASSAY THYROID STIM HORMONE: CPT

## 2021-01-07 PROCEDURE — 36415 COLL VENOUS BLD VENIPUNCTURE: CPT

## 2021-01-07 PROCEDURE — 1036F TOBACCO NON-USER: CPT | Performed by: STUDENT IN AN ORGANIZED HEALTH CARE EDUCATION/TRAINING PROGRAM

## 2021-01-07 PROCEDURE — 99396 PREV VISIT EST AGE 40-64: CPT | Performed by: STUDENT IN AN ORGANIZED HEALTH CARE EDUCATION/TRAINING PROGRAM

## 2021-01-07 PROCEDURE — 3008F BODY MASS INDEX DOCD: CPT | Performed by: STUDENT IN AN ORGANIZED HEALTH CARE EDUCATION/TRAINING PROGRAM

## 2021-01-07 PROCEDURE — G0145 SCR C/V CYTO,THINLAYER,RESCR: HCPCS | Performed by: STUDENT IN AN ORGANIZED HEALTH CARE EDUCATION/TRAINING PROGRAM

## 2021-01-07 PROCEDURE — 87624 HPV HI-RISK TYP POOLED RSLT: CPT

## 2021-01-07 PROCEDURE — 3725F SCREEN DEPRESSION PERFORMED: CPT | Performed by: STUDENT IN AN ORGANIZED HEALTH CARE EDUCATION/TRAINING PROGRAM

## 2021-01-07 NOTE — PROGRESS NOTES
Annual GYN Examination  Bill Alcantar  1975    Subjective      Bill Alcantar is a 41 y o  female who presents for annual well woman exam      GYN:  · Denies vaginal discharge, labial erythema or lesions, dyspareunia  · Menarche at age 6  · Menses are regular, q 28-30 days, lasting 21 days  · no unusual pelvic pain  · no unusual vaginal discharge  · no previous abnormal Pap tests  · Pap smear 2016 negative, HR HPV negative  Repeat Pap and HPV cotesting today  · no family or personal history of cervical cancer  · LMP 1/3/2021  · Contraception: mirena IUD placed 17 effective through 2022  · Patient is sexually active with one partner, her   · Gynecologic surgeries: Denies    OB:  · [id number]   OB History        5    Para   3    Term   3            AB   2    Living   3       SAB   2    TAB        Ectopic        Multiple   0    Live Births   3               · Pregnancies were uncomplicated  · 3 children who is 25, 21 and 3 yo  :  · Denies dysuria, urinary frequency or urgency  · Denies hematuria, flank pain, incontinence  Breast:  · Denies breast mass, skin changes, dimpling, reddening, nipple retraction  · Denies breast discharge  · Patient does not do monthly breast exams  · Patient does have a family history of breast, endometrial, or ovarian cancer  Mother has breast cancer at age 59, s/p lumpectomy, chemo, and radiation  General:  · Diet: well balanced diet  · Exercise: Does not engage in regular exercise  · Work: currently employed, full-time at [employer]  · ETOH: none  · Tobacco: Never used  · Recreational drug use: denies use    Screening:  · Cervical cancer: Last pap done , no abnormalities, high-risk HPV negative  · Breast cancer: Last mammogram was in 21  Results were normal--routine follow-up in 12 months  · Colon cancer: will screen at 51   · STD screening: Very low risk of STD exposure, no STI screening today        Review of Systems  Pertinent items are noted in HPI  Objective      LMP 1/3/21    General:   alert, appears stated age and cooperative   Heart: regular rate and rhythm, S1, S2 normal, no murmur, click, rub or gallop   Lungs: clear to auscultation bilaterally   Breasts: normal appearance, no masses or tenderness, No nipple retraction or dimpling, No nipple discharge or bleeding, No axillary or supraclavicular adenopathy   Abdomen: soft, non-tender, without masses or organomegaly   Vulva: normal   Vagina: normal mucosa, scant blood   Cervix: no cervical motion tenderness and Mirena string noticed  Uterus: normal size, mobile   Adnexa: no mass, fullness, tenderness               Assessment/Plan:   Problem List Items Addressed This Visit        Genitourinary    Abnormal uterine bleeding     Patient has Mirena placed in 2017, did not have menses in 2 years, then resumed her periods every month last year, 2020  Each period lasts 3 weeks, light bright red blood for which patient uses panty liners  Will check TSH and CBC  Also do US pelvic  Will follow up in 6 weeks  Relevant Orders    US pelvis complete w transvaginal       Other    Screening for thyroid disorder    Relevant Orders    TSH, 3rd generation with Free T4 reflex    Women's annual routine gynecological examination - Primary    Relevant Orders    Liquid-based pap, screening          -      Pap smear with HPV co-testing performed today   -      Will await results and notify patient when results received  -      Health maintenance counseling performed on the following topics:   -      Regular exercise (at least 150 minutes of moderate aerobic activity per week or 75 minutes of vigorous aerobic activity per week) for CV & bone health    -      Diet well balanced diet to maintain a healthy weight   -      Adequate intake of calcium & vitamin D to reduce osteoporosis risk  -      All questions have been answered to her satisfaction        Huong Khan, MD  [facility]

## 2021-01-07 NOTE — ASSESSMENT & PLAN NOTE
Patient has Mirena placed in 2017, did not have menses in 2 years, then resumed her periods every month last year, 2020  Each period lasts 3 weeks, light bright red blood for which patient uses panty liners  Will check TSH and CBC  Also do US pelvic  Will follow up in 6 weeks

## 2021-01-07 NOTE — PATIENT INSTRUCTIONS

## 2021-01-08 ENCOUNTER — LAB (OUTPATIENT)
Dept: LAB | Facility: HOSPITAL | Age: 46
End: 2021-01-08
Payer: COMMERCIAL

## 2021-01-08 DIAGNOSIS — R73.09 ELEVATED GLUCOSE: ICD-10-CM

## 2021-01-08 DIAGNOSIS — Z13.220 SCREENING, LIPID: ICD-10-CM

## 2021-01-08 DIAGNOSIS — E66.3 OVERWEIGHT (BMI 25.0-29.9): ICD-10-CM

## 2021-01-08 DIAGNOSIS — Z13.0 SCREENING FOR DEFICIENCY ANEMIA: ICD-10-CM

## 2021-01-08 LAB
ALBUMIN SERPL BCP-MCNC: 3.9 G/DL (ref 3.5–5)
ALP SERPL-CCNC: 77 U/L (ref 46–116)
ALT SERPL W P-5'-P-CCNC: 26 U/L (ref 12–78)
ANION GAP SERPL CALCULATED.3IONS-SCNC: 3 MMOL/L (ref 4–13)
AST SERPL W P-5'-P-CCNC: 15 U/L (ref 5–45)
BASOPHILS # BLD AUTO: 0.03 THOUSANDS/ΜL (ref 0–0.1)
BASOPHILS NFR BLD AUTO: 0 % (ref 0–1)
BILIRUB SERPL-MCNC: 0.41 MG/DL (ref 0.2–1)
BUN SERPL-MCNC: 15 MG/DL (ref 5–25)
CALCIUM SERPL-MCNC: 8.8 MG/DL (ref 8.3–10.1)
CHLORIDE SERPL-SCNC: 107 MMOL/L (ref 100–108)
CHOLEST SERPL-MCNC: 184 MG/DL (ref 50–200)
CO2 SERPL-SCNC: 28 MMOL/L (ref 21–32)
CREAT SERPL-MCNC: 0.58 MG/DL (ref 0.6–1.3)
EOSINOPHIL # BLD AUTO: 0.08 THOUSAND/ΜL (ref 0–0.61)
EOSINOPHIL NFR BLD AUTO: 1 % (ref 0–6)
ERYTHROCYTE [DISTWIDTH] IN BLOOD BY AUTOMATED COUNT: 12.5 % (ref 11.6–15.1)
EST. AVERAGE GLUCOSE BLD GHB EST-MCNC: 103 MG/DL
GFR SERPL CREATININE-BSD FRML MDRD: 112 ML/MIN/1.73SQ M
GLUCOSE P FAST SERPL-MCNC: 96 MG/DL (ref 65–99)
HBA1C MFR BLD: 5.2 %
HCT VFR BLD AUTO: 45.3 % (ref 34.8–46.1)
HDLC SERPL-MCNC: 45 MG/DL
HGB BLD-MCNC: 14.4 G/DL (ref 11.5–15.4)
IMM GRANULOCYTES # BLD AUTO: 0.03 THOUSAND/UL (ref 0–0.2)
IMM GRANULOCYTES NFR BLD AUTO: 0 % (ref 0–2)
LDLC SERPL CALC-MCNC: 115 MG/DL (ref 0–100)
LYMPHOCYTES # BLD AUTO: 2.7 THOUSANDS/ΜL (ref 0.6–4.47)
LYMPHOCYTES NFR BLD AUTO: 34 % (ref 14–44)
MCH RBC QN AUTO: 30.2 PG (ref 26.8–34.3)
MCHC RBC AUTO-ENTMCNC: 31.8 G/DL (ref 31.4–37.4)
MCV RBC AUTO: 95 FL (ref 82–98)
MONOCYTES # BLD AUTO: 0.5 THOUSAND/ΜL (ref 0.17–1.22)
MONOCYTES NFR BLD AUTO: 6 % (ref 4–12)
NEUTROPHILS # BLD AUTO: 4.5 THOUSANDS/ΜL (ref 1.85–7.62)
NEUTS SEG NFR BLD AUTO: 59 % (ref 43–75)
NONHDLC SERPL-MCNC: 139 MG/DL
NRBC BLD AUTO-RTO: 0 /100 WBCS
PLATELET # BLD AUTO: 263 THOUSANDS/UL (ref 149–390)
PMV BLD AUTO: 11.7 FL (ref 8.9–12.7)
POTASSIUM SERPL-SCNC: 3.8 MMOL/L (ref 3.5–5.3)
PROT SERPL-MCNC: 7.8 G/DL (ref 6.4–8.2)
RBC # BLD AUTO: 4.77 MILLION/UL (ref 3.81–5.12)
SODIUM SERPL-SCNC: 138 MMOL/L (ref 136–145)
TRIGL SERPL-MCNC: 119 MG/DL
WBC # BLD AUTO: 7.84 THOUSAND/UL (ref 4.31–10.16)

## 2021-01-08 PROCEDURE — 36415 COLL VENOUS BLD VENIPUNCTURE: CPT

## 2021-01-08 PROCEDURE — 80053 COMPREHEN METABOLIC PANEL: CPT

## 2021-01-08 PROCEDURE — 85025 COMPLETE CBC W/AUTO DIFF WBC: CPT

## 2021-01-08 PROCEDURE — 83036 HEMOGLOBIN GLYCOSYLATED A1C: CPT

## 2021-01-08 PROCEDURE — 80061 LIPID PANEL: CPT

## 2021-01-09 LAB
HPV HR 12 DNA CVX QL NAA+PROBE: NEGATIVE
HPV16 DNA CVX QL NAA+PROBE: NEGATIVE
HPV18 DNA CVX QL NAA+PROBE: NEGATIVE

## 2021-01-11 ENCOUNTER — TELEPHONE (OUTPATIENT)
Dept: OBGYN CLINIC | Facility: MEDICAL CENTER | Age: 46
End: 2021-01-11

## 2021-01-11 LAB
LAB AP GYN PRIMARY INTERPRETATION: NORMAL
Lab: NORMAL

## 2021-01-13 ENCOUNTER — HOSPITAL ENCOUNTER (OUTPATIENT)
Dept: RADIOLOGY | Facility: HOSPITAL | Age: 46
Discharge: HOME | End: 2021-01-13
Payer: COMMERCIAL

## 2021-01-13 DIAGNOSIS — N93.9 ABNORMAL UTERINE BLEEDING: ICD-10-CM

## 2021-01-13 PROCEDURE — 76830 TRANSVAGINAL US NON-OB: CPT

## 2021-01-13 PROCEDURE — 76856 US EXAM PELVIC COMPLETE: CPT

## 2021-01-28 ENCOUNTER — TRANSCRIBE ORDERS (OUTPATIENT)
Dept: LAB | Facility: HOSPITAL | Age: 46
End: 2021-01-28

## 2021-01-28 ENCOUNTER — LAB (OUTPATIENT)
Dept: LAB | Facility: HOSPITAL | Age: 46
End: 2021-01-28
Payer: COMMERCIAL

## 2021-01-28 DIAGNOSIS — Z79.899 ENCOUNTER FOR LONG-TERM (CURRENT) USE OF OTHER MEDICATIONS: Primary | ICD-10-CM

## 2021-01-28 DIAGNOSIS — Z79.899 ENCOUNTER FOR LONG-TERM (CURRENT) USE OF OTHER MEDICATIONS: ICD-10-CM

## 2021-01-28 LAB
ALBUMIN SERPL BCP-MCNC: 3.8 G/DL (ref 3.5–5)
ALP SERPL-CCNC: 82 U/L (ref 46–116)
ALT SERPL W P-5'-P-CCNC: 19 U/L (ref 12–78)
AST SERPL W P-5'-P-CCNC: 10 U/L (ref 5–45)
BILIRUB DIRECT SERPL-MCNC: 0.1 MG/DL (ref 0–0.2)
BILIRUB SERPL-MCNC: 0.3 MG/DL (ref 0.2–1)
PROT SERPL-MCNC: 7.5 G/DL (ref 6.4–8.2)

## 2021-01-28 PROCEDURE — 80076 HEPATIC FUNCTION PANEL: CPT

## 2021-01-28 PROCEDURE — 36415 COLL VENOUS BLD VENIPUNCTURE: CPT

## 2021-02-15 ENCOUNTER — OFFICE VISIT (OUTPATIENT)
Dept: OBGYN CLINIC | Facility: CLINIC | Age: 46
End: 2021-02-15

## 2021-02-15 VITALS
DIASTOLIC BLOOD PRESSURE: 77 MMHG | WEIGHT: 144 LBS | SYSTOLIC BLOOD PRESSURE: 112 MMHG | BODY MASS INDEX: 29.03 KG/M2 | HEIGHT: 59 IN | HEART RATE: 79 BPM

## 2021-02-15 DIAGNOSIS — R92.8 ABNORMAL MAMMOGRAM: ICD-10-CM

## 2021-02-15 DIAGNOSIS — N93.9 ABNORMAL UTERINE BLEEDING (AUB): Primary | ICD-10-CM

## 2021-02-15 LAB — SL AMB POCT URINE HCG: NORMAL

## 2021-02-15 PROCEDURE — 99212 OFFICE O/P EST SF 10 MIN: CPT | Performed by: OBSTETRICS & GYNECOLOGY

## 2021-02-15 PROCEDURE — 81025 URINE PREGNANCY TEST: CPT | Performed by: OBSTETRICS & GYNECOLOGY

## 2021-02-15 RX ORDER — MEDROXYPROGESTERONE ACETATE 150 MG/ML
150 INJECTION, SUSPENSION INTRAMUSCULAR
Qty: 1 ML | Refills: 0 | Status: SHIPPED | OUTPATIENT
Start: 2021-02-15 | End: 2021-09-08

## 2021-02-15 RX ORDER — MEDROXYPROGESTERONE ACETATE 150 MG/ML
150 INJECTION, SUSPENSION INTRAMUSCULAR
Qty: 1 ML | Refills: 2 | Status: SHIPPED | OUTPATIENT
Start: 2021-02-15 | End: 2021-02-15

## 2021-02-15 NOTE — PROGRESS NOTES
[address] [address]  Phone#  [phone]  Fax#  [phone]  [facility] VISIT      Fabiola Balderrama is a 41 y o  female here for results discussion  With abnormal uterine bleeding, she has IUD in place since 2017, patient states before her IUD she had menstrual cycles every 28 days lasting 3 days, moderate flow  After her IUD was placed she had some vaginal spotting the first couple weeks and subsequent did not have  menstrual periods  In the last year she started had regular menstrual periods again but in the last 2 menstrual cycles she has been bleeding for 3 weeks  She states she use between 2-4 pads per day during this 3 weeks of bleeding  Currently she is not having vaginal bleeding  She was evaluated previously by our team, TSH, CBC and pelvic ultrasound were order  CBC with hemoglobin of 14 4, TSH within normal limits, pelvic ultrasound with evidence of submucosal fibroid of 15 x 12 x 15 mm, the IUD is in right location  Last Pap smear 2021 with Co testing:  negative    Patient denies, personal family history of thromboembolic events, she do not smoke, denies history of migraines with our or hypertensive disorders  She placed the IUD initially as contraception  Personal health questionnaire reviewed: yes  Gynecologic History  No LMP recorded  (Menstrual status: Birth Control)  Contraception: IUD  Last Pap:  2021  Results were: normal  Last mammogram: 2021   Results were:  BI-RADS 3 on left breast probable benign, BI-RADS 1 on right breast, patient is due for diagnostic mammogram in 1 year for of both breasts    Obstetric History  OB History    Para Term  AB Living   5 3 3   2 3   SAB TAB Ectopic Multiple Live Births   2     0 3      # Outcome Date GA Lbr Juanito/2nd Weight Sex Delivery Anes PTL Lv   5 Term 17 40w0d / 00:19 3030 g (6 lb 10 9 oz) M Vag-Spont EPI N CRUZ   4 SAB 02/03/15 10w0d          3 SAB 02/03/08           2 Term 05/29/01 40w0d  2948 g (6 lb 8 oz) F Vag-Spont   CRUZ   1 Term 09/06/98 40w0d  3175 g (7 lb) M Vag-Spont  N CRUZ         The following portions of the patient's history were reviewed and updated as appropriate: current medications, past social history, past surgical history and problem list     Review of Systems  Pertinent items are noted in HPI  Objective  Blood pressure 112/77, pulse 79, height 4' 11" (1 499 m), weight 65 3 kg (144 lb), not currently breastfeeding  Physical examination   Cardio-pulmonar  Normal vesicular murmur, no rales,  nonlabored breathing , normal S1/S2 no cardiac murmurs , no gallops   Abdomen  Soft , no tender, no signs or peritoneal irritation   Extremities  mobiles no edemas      Assessment/Plan    Abnormal uterine bleeding with IUD in place  · Patient with IUD since 07/24/2017, due for removal in 07/24/2022  Patient placed IUD initially for contraception  · In last 2 cycles patient with evidence of menstrual periods lasting 3 weeks  Last hemoglobin 14 4, TSH normal  · Pelvic ultrasound with evidence of IUD placed at in the right position with some mucosal fibroid of 15 x 12 x 15 mm  Normal Pap smear  · Today we discussed with patient if we remove the IUD probably she is going to have heavier vaginal bleeding due to presence of submucosal fibroid  · At this time with order 1 Depo-Provera shot and will re-evaluate patient in 3 months  · Plan has been discussed with patient, she understood and agreed with plan    Abnormal screening mammogram  · Mammogram 01/05/2021 with BI-RADS 3 on left breast and the rectus 1 on right breast  · Patient needs at DIAGNOSTIC mammogram in 1 year for both breast      D/w Dr Mary Arroyo MD

## 2021-02-15 NOTE — PROGRESS NOTES
Depo-Provera      [x]   Patient provided box   o 2 Refills remain  o Refills submitted no   Last  Annual Date / Birth control check : 1/7/2021   Last Depo date: 2/15/2021   Side effects: no   HCG: yes  o if applicable: negative   Given by: Kristin Toure MA   Site: left deltoid    o Calcium supplement daily teaching  o Condoms for 2 weeks following first injection dose

## 2021-03-03 ENCOUNTER — OFFICE VISIT (OUTPATIENT)
Dept: URGENT CARE | Age: 46
End: 2021-03-03
Payer: COMMERCIAL

## 2021-03-03 VITALS — TEMPERATURE: 96.9 F | RESPIRATION RATE: 20 BRPM | OXYGEN SATURATION: 98 % | HEART RATE: 88 BPM

## 2021-03-03 DIAGNOSIS — R05.9 COUGH: Primary | ICD-10-CM

## 2021-03-03 PROCEDURE — G0382 LEV 3 HOSP TYPE B ED VISIT: HCPCS | Performed by: PHYSICIAN ASSISTANT

## 2021-03-03 PROCEDURE — U0005 INFEC AGEN DETEC AMPLI PROBE: HCPCS | Performed by: PHYSICIAN ASSISTANT

## 2021-03-03 PROCEDURE — S9083 URGENT CARE CENTER GLOBAL: HCPCS | Performed by: PHYSICIAN ASSISTANT

## 2021-03-03 PROCEDURE — U0003 INFECTIOUS AGENT DETECTION BY NUCLEIC ACID (DNA OR RNA); SEVERE ACUTE RESPIRATORY SYNDROME CORONAVIRUS 2 (SARS-COV-2) (CORONAVIRUS DISEASE [COVID-19]), AMPLIFIED PROBE TECHNIQUE, MAKING USE OF HIGH THROUGHPUT TECHNOLOGIES AS DESCRIBED BY CMS-2020-01-R: HCPCS | Performed by: PHYSICIAN ASSISTANT

## 2021-03-03 RX ORDER — BENZONATATE 100 MG/1
100 CAPSULE ORAL 3 TIMES DAILY PRN
Qty: 9 CAPSULE | Refills: 0 | Status: SHIPPED | OUTPATIENT
Start: 2021-03-03 | End: 2021-03-05 | Stop reason: SDUPTHER

## 2021-03-04 LAB — SARS-COV-2 RNA RESP QL NAA+PROBE: NEGATIVE

## 2021-03-04 NOTE — PROGRESS NOTES
[facility]        NAME: Dylan Posada is a 41 y o  female  : 1975    MRN: [id number]  DATE: March 3, 2021  TIME: 8:38 PM    Assessment and Plan   Cough [R05]  1  Cough  benzonatate (TESSALON PERLES) 100 mg capsule    Novel Coronavirus (Covid-19),PCR [facility] - Office Collection     Requesting covid test and cough medication- will send in tessalon pearles per patient request  COVID test pending    Patient Instructions     COVID swab collected today, test results pending  Check MyChart and call in 2-3 days for test results  Results may take up to 7-10 days  Quarantine guidelines discussed  OTC supplements/medications discussed  Take Tessalon pearles as prescribed if needed  Follow-up with PCP in the next 1-2 days for re-evaluation  Go to the ED if any fevers, unable to stay hydrated, abdominal pain, chest pain, shortness of breath, wheezing, chest tightness, cough or cold symptoms, new or worsening symptoms or other concerning symptoms  Chief Complaint     Chief Complaint   Patient presents with    Cough     pt states started with a cough 1 week ago, no known covid exposures         History of Present Illness        31-year-old female presents with cough x 5-6 days  States she has also been having some postnasal drip  States the cough is intermittent and dry  Denies any mucus or phlegm production  She denies any nasal congestion, rhinorrhea, sinus pressure, sore throat, ear pain, fevers or other cold-like symptoms  She denies any loss of taste or smell  She denies any travel or known COVID exposures  States she is working at [employer] she has a history of allergies and thought it was maybe allergies  Has tried Robitussin with significant relief  She denies any chest pain, chest tightness, shortness of breath, loss of taste/smell, GI/ symptoms or other complaints  She denies any pregnancy risk  States she came for COVID testing and a cough medication        Review of Systems   Review of Systems   Constitutional: Negative for activity change, appetite change, chills, fatigue and fever  HENT: Positive for postnasal drip  Negative for congestion, ear pain, facial swelling, rhinorrhea, sinus pressure, sore throat, trouble swallowing and voice change  Eyes: Negative for discharge, itching and visual disturbance  Respiratory: Positive for cough  Negative for chest tightness, shortness of breath and wheezing  Cardiovascular: Negative for chest pain  Gastrointestinal: Negative for abdominal pain, diarrhea, nausea and vomiting  Musculoskeletal: Negative for back pain and neck pain  Skin: Negative for rash  Neurological: Negative for dizziness, syncope, weakness, numbness and headaches  All other systems reviewed and are negative          Current Medications       Current Outpatient Medications:     aspirin-acetaminophen-caffeine (EXCEDRIN MIGRAINE) 250-250-65 MG per tablet, Take 1 tablet by mouth every 6 (six) hours as needed for headaches, Disp: , Rfl:     levonorgestrel (MIRENA) 20 MCG/24HR IUD, 1 each by Intrauterine route once, Disp: , Rfl:     medroxyPROGESTERone (DEPO-PROVERA) 150 mg/mL injection, Inject 1 mL (150 mg total) into a muscle every 3 (three) months, Disp: 1 mL, Rfl: 0    benzonatate (TESSALON PERLES) 100 mg capsule, Take 1 capsule (100 mg total) by mouth 3 (three) times a day as needed for cough, Disp: 9 capsule, Rfl: 0    methocarbamol (ROBAXIN) 750 mg tablet, Take 1 tablet (750 mg total) by mouth 3 (three) times a day as needed for muscle spasms (Patient not taking: Reported on 2/15/2021), Disp: 30 tablet, Rfl: 0    omeprazole (PriLOSEC) 20 mg delayed release capsule, Take 20 mg by mouth daily, Disp: , Rfl: 5    Current Allergies     Allergies as of 03/03/2021 - Reviewed 03/03/2021   Allergen Reaction Noted    Other Swelling 02/04/2017            The following portions of the patient's history were reviewed and updated as appropriate: allergies, current medications, past family history, past medical history, past social history, past surgical history and problem list      Past Medical History:   Diagnosis Date    GERD (gastroesophageal reflux disease)     Migraine     takes excederine migraine       Past Surgical History:   Procedure Laterality Date    DENTAL SURGERY         Family History   Problem Relation Age of Onset    Breast cancer Mother         58 y/o    [facility] BRCA1 Negative Mother     Diabetes Father     Hypertension Father     No Known Problems Brother     No Known Problems Daughter     No Known Problems Son     Breast cancer Maternal Grandmother 46    Lung cancer Maternal Grandmother 59    Alzheimer's disease Maternal Grandfather     Heart disease Paternal Grandmother     No Known Problems Paternal Grandfather     No Known Problems Brother     No Known Problems Brother     No Known Problems Son     Lung cancer Maternal Uncle 48    Throat cancer Cousin 39    BRCA1 Positive Cousin     Kidney cancer Cousin 41         Medications have been verified  Objective   Pulse 88   Temp (!) 96 9 °F (36 1 °C)   Resp 20   LMP 02/23/2021   SpO2 98%        Physical Exam     Physical Exam  Vitals signs and nursing note reviewed  Constitutional:       General: She is not in acute distress  Appearance: She is well-developed  Comments: Smiling, talkative   HENT:      Head: Normocephalic and atraumatic  Right Ear: Tympanic membrane normal       Left Ear: Tympanic membrane normal       Mouth/Throat:      Mouth: Mucous membranes are moist       Pharynx: Oropharynx is clear  Uvula midline  No oropharyngeal exudate, posterior oropharyngeal erythema or uvula swelling  Eyes:      Pupils: Pupils are equal, round, and reactive to light  Neck:      Musculoskeletal: Normal range of motion and neck supple  Cardiovascular:      Rate and Rhythm: Normal rate and regular rhythm  Heart sounds: Normal heart sounds  Pulmonary:      Effort: Pulmonary effort is normal  No respiratory distress  Breath sounds: Normal breath sounds  No wheezing  Skin:     Capillary Refill: Capillary refill takes less than 2 seconds  Neurological:      Mental Status: She is alert and oriented to person, place, and time     Psychiatric:         Behavior: Behavior normal

## 2021-03-04 NOTE — PATIENT INSTRUCTIONS
COVID swab collected today, test results pending  Check MyChart and call in 2-3 days for test results  Results may take up to 7-10 days  Quarantine guidelines discussed  OTC supplements/medications discussed  Take Tessalon pearles as prescribed if needed  Follow-up with PCP in the next 1-2 days for re-evaluation  Go to the ED if any fevers, unable to stay hydrated, abdominal pain, chest pain, shortness of breath, wheezing, chest tightness, cough or cold symptoms, new or worsening symptoms or other concerning symptoms  [address]    Your healthcare provider and/or public health staff have evaluated you and have determined that you do not need to remain in the hospital at this time  At this time you can be isolated at home where you will be monitored by staff from your local or state health department  You should carefully follow the prevention and isolation steps below until a healthcare provider or local or state health department says that you can return to your normal activities  Stay home except to get medical care    People who are mildly ill with COVID-19 are able to isolate at home during their illness  You should restrict activities outside your home, except for getting medical care  Do not go to work, school, or public areas  Avoid using public transportation, ride-sharing, or taxis  Separate yourself from other people and animals in your home    People: As much as possible, you should stay in a specific room and away from other people in your home  Also, you should use a separate bathroom, if available  Animals: You should restrict contact with pets and other animals while you are sick with COVID-19, just like you would around other people  Although there have not been reports of pets or other animals becoming sick with COVID-19, it is still recommended that people sick with COVID-19 limit contact with animals until more information is known about the virus   When possible, have another member of your household care for your animals while you are sick  If you are sick with COVID-19, avoid contact with your pet, including petting, snuggling, being kissed or licked, and sharing food  If you must care for your pet or be around animals while you are sick, wash your hands before and after you interact with pets and wear a facemask  See COVID-19 and Animals for more information  Call ahead before visiting your doctor    If you have a medical appointment, call the healthcare provider and tell them that you have or may have COVID-19  This will help the healthcare providers office take steps to keep other people from getting infected or exposed  Wear a facemask    You should wear a facemask when you are around other people (e g , sharing a room or vehicle) or pets and before you enter a healthcare providers office  If you are not able to wear a facemask (for example, because it causes trouble breathing), then people who live with you should not stay in the same room with you, or they should wear a facemask if they enter your room  Cover your coughs and sneezes    Cover your mouth and nose with a tissue when you cough or sneeze  Throw used tissues in a lined trash can  Immediately wash your hands with soap and water for at least 20 seconds or, if soap and water are not available, clean your hands with an alcohol-based hand  that contains at least 60% alcohol  Clean your hands often    Wash your hands often with soap and water for at least 20 seconds, especially after blowing your nose, coughing, or sneezing; going to the bathroom; and before eating or preparing food  If soap and water are not readily available, use an alcohol-based hand  with at least 60% alcohol, covering all surfaces of your hands and rubbing them together until they feel dry  Soap and water are the best option if hands are visibly dirty   Avoid touching your eyes, nose, and mouth with unwashed hands  Avoid sharing personal household items    You should not share dishes, drinking glasses, cups, eating utensils, towels, or bedding with other people or pets in your home  After using these items, they should be washed thoroughly with soap and water  Clean all high-touch surfaces everyday    High touch surfaces include counters, tabletops, doorknobs, bathroom fixtures, toilets, phones, keyboards, tablets, and bedside tables  Also, clean any surfaces that may have blood, stool, or body fluids on them  Use a household cleaning spray or wipe, according to the label instructions  Labels contain instructions for safe and effective use of the cleaning product including precautions you should take when applying the product, such as wearing gloves and making sure you have good ventilation during use of the product  Monitor your symptoms    Seek prompt medical attention if your illness is worsening (e g , difficulty breathing)  Before seeking care, call your healthcare provider and tell them that you have, or are being evaluated for, COVID-19  Put on a facemask before you enter the facility  These steps will help the healthcare providers office to keep other people in the office or waiting room from getting infected or exposed  Ask your healthcare provider to call the local or Critical access hospital health department  Persons who are placed under active monitoring or facilitated self-monitoring should follow instructions provided by their local health department or occupational health professionals, as appropriate  If you have a medical emergency and need to call 911, notify the dispatch personnel that you have, or are being evaluated for COVID-19  If possible, put on a facemask before emergency medical services arrive      Discontinuing home isolation    Patients with confirmed COVID-19 should remain under home isolation precautions until the following conditions are met:   - They have had no fever for at least 24 hours (that is one full day of no fever without the use medicine that reduces fevers)  AND  - other symptoms have improved (for example, when their cough or shortness of breath have improved)  AND  - If had mild or moderate illness, at least 10 days have passed since their symptoms first appeared or if severe illness (needed oxygen) or immunosuppressed, at least 20 days have passed since symptoms first appeared  Patients with confirmed COVID-19 should also notify close contacts (including their workplace) and ask that they self-quarantine  Currently, close contact is defined as being within 6 feet for 15 minutes or more from the period 24 hours starting 48 hours before symptom onset to the time at which the patient went into isolation  Close contacts of patients diagnosed with COVID-19 should be instructed by the patient to self-quarantine for 14 days from the last time of their last contact with the patient       Source: [employer]

## 2021-03-05 ENCOUNTER — TELEMEDICINE (OUTPATIENT)
Dept: INTERNAL MEDICINE CLINIC | Facility: CLINIC | Age: 46
End: 2021-03-05

## 2021-03-05 ENCOUNTER — TELEPHONE (OUTPATIENT)
Dept: URGENT CARE | Age: 46
End: 2021-03-05

## 2021-03-05 VITALS — WEIGHT: 130 LBS | BODY MASS INDEX: 26.21 KG/M2 | HEIGHT: 59 IN

## 2021-03-05 DIAGNOSIS — K21.9 GASTROESOPHAGEAL REFLUX DISEASE, UNSPECIFIED WHETHER ESOPHAGITIS PRESENT: ICD-10-CM

## 2021-03-05 DIAGNOSIS — J06.9 UPPER RESPIRATORY TRACT INFECTION, UNSPECIFIED TYPE: Primary | ICD-10-CM

## 2021-03-05 DIAGNOSIS — R05.9 COUGH: ICD-10-CM

## 2021-03-05 PROCEDURE — 3008F BODY MASS INDEX DOCD: CPT | Performed by: PHYSICIAN ASSISTANT

## 2021-03-05 PROCEDURE — 1036F TOBACCO NON-USER: CPT | Performed by: PHYSICIAN ASSISTANT

## 2021-03-05 PROCEDURE — 99213 OFFICE O/P EST LOW 20 MIN: CPT | Performed by: PHYSICIAN ASSISTANT

## 2021-03-05 RX ORDER — FLUTICASONE PROPIONATE 50 MCG
2 SPRAY, SUSPENSION (ML) NASAL DAILY
Qty: 1 BOTTLE | Refills: 0 | Status: SHIPPED | OUTPATIENT
Start: 2021-03-05 | End: 2021-09-08

## 2021-03-05 RX ORDER — BENZONATATE 100 MG/1
100 CAPSULE ORAL 3 TIMES DAILY PRN
Qty: 21 CAPSULE | Refills: 0 | Status: SHIPPED | OUTPATIENT
Start: 2021-03-05 | End: 2021-03-12

## 2021-03-05 RX ORDER — LORATADINE 10 MG/1
10 TABLET ORAL DAILY
Qty: 10 TABLET | Refills: 0 | Status: SHIPPED | OUTPATIENT
Start: 2021-03-05 | End: 2021-09-08

## 2021-03-05 RX ORDER — OMEPRAZOLE 20 MG/1
20 CAPSULE, DELAYED RELEASE ORAL DAILY
Qty: 30 CAPSULE | Refills: 1 | Status: SHIPPED | OUTPATIENT
Start: 2021-03-05 | End: 2021-09-08 | Stop reason: SDUPTHER

## 2021-03-05 NOTE — TELEPHONE ENCOUNTER
Called and discussed negative COVID result with patient  Answered all questions  Patient agrees to follow-up with PCP, or to go to the ER sooner if any new, worsening or other concerning symptoms

## 2021-03-05 NOTE — PROGRESS NOTES
COVID-19 Virtual Visit     Assessment/Plan:    Problem List Items Addressed This Visit     None      Visit Diagnoses     Upper respiratory tract infection, unspecified type    -  Primary    Relevant Medications    fluticasone (FLONASE) 50 mcg/act nasal spray    loratadine (CLARITIN) 10 mg tablet    Gastroesophageal reflux disease, unspecified whether esophagitis present        Relevant Medications    omeprazole (PriLOSEC) 20 mg delayed release capsule    Cough        Relevant Medications    benzonatate (TESSALON PERLES) 100 mg capsule         Disposition:     After clarifying the patient's history, my suspicion for COVID-19 infection is very low  Pleasant 78-year-old female presenting today for over a week of a cough as well as some nasal congestion, left ear pain yesterday  Evaluated in urgent care a few days ago and just received call that her COVID test is negative  She states benzonatate they prescribed her helping cough a little bit but symptoms are persisting  Based on assessment and discussion of her symptoms today, obvious COVID suspicion is low since her test was negative, no known COVID contacts and she has no other COVID type symptoms  At this time however I do not feel this is bacterial related based on this limited assessment and I do not feel antibiotics are immediately warranted  I will continue patient on benzonatate cough pills t i d  P r n  Cough but will add an antihistamine and fluticasone nasal spray to help with nasal congestion and see if this will come down irritation in her throat that make be causing spasm and cough  She will use fluticasone nasal spray 2 sprays each nostril once a day for week in addition to loratadine 10 mg once a day q h s  For 5-7 days  Plenty of rest, clear fluids for hydration, warm steam, Vicks Vaporub all recommended for conservative management    I told patient to call back middle of next week if symptoms persist despite treatment that may require additional treatment, possibly trial of antibiotics at that time  She does not need to be seen for virtual visit unless her symptoms are significantly worse or new symptoms arise that need reassessment  Patient agrees with treatment plan today  Patient also requesting a brief course of refill of omeprazole until she can get in to see specialist   Omeprazole refilled for her today  I have spent 12 minutes directly with the patient  Greater than 50% of this time was spent in counseling/coordination of care regarding: prognosis, risks and benefits of treatment options, instructions for management and impressions  Encounter provider Milena Muller PA-C    Provider located at [id number] [place] [address] Road  [address] [place], [place] [id number] [phone]    Recent Visits  No visits were found meeting these conditions  Showing recent visits within past 7 days and meeting all other requirements     Today's Visits  Date Type Provider Dept   03/05/21 207 [facility], [address] [place] [address] today's visits and meeting all other requirements     Future Appointments  No visits were found meeting these conditions  Showing future appointments within next 150 days and meeting all other requirements      This virtual check-in was done via [employer] and patient was informed that this is a secure, HIPAA-compliant platform  She agrees to proceed  Patient agrees to participate in a virtual check in via telephone or video visit instead of presenting to the office to address urgent/immediate medical needs  Patient is aware this is a billable service  After connecting through [facility], the patient was identified by name and date of birth  Demarco Lizarraga was informed that this was a telemedicine visit and that the exam was being conducted confidentially over secure lines  My office door was closed  No one else was in the room  Mya Chirinos acknowledged consent and understanding of privacy and security of the telemedicine visit  I informed the patient that I have reviewed her record in Epic and presented the opportunity for her to ask any questions regarding the visit today  The patient agreed to participate  Subjective: Mya Chirinos is a 41 y o  female who is concerned about COVID-19  Patient's symptoms include nasal congestion, rhinorrhea, cough (dry, more so with talking) and headache (last week, resolved since)  Patient denies fever, chills, sore throat, anosmia, loss of taste, shortness of breath, chest tightness, abdominal pain, nausea, vomiting, diarrhea and myalgias  Date of symptom onset: 2/23/2021    Exposure:   Contact with a person who is under investigation (PUI) for or who is positive for COVID-19 within the last 14 days?: No    Hospitalized recently for fever and/or lower respiratory symptoms?: No      Currently a healthcare worker that is involved in direct patient care?: No      Works in a special setting where the risk of COVID-19 transmission may be high? (this may include long-term care, correctional and intermediate facilities; homeless shelters; assisted-living facilities and group homes ): No      Resident in a special setting where the risk of COVID-19 transmission may be high? (this may include long-term care, correctional and intermediate facilities; homeless shelters; assisted-living facilities and group homes ): No      States went to urgent care 2 days ago - covid test today negative  states taking medicine from urgent care benzonatate and helping a little, also taking Robitussin    Pressure in left ear      Lab Results   Component Value Date    SARSCOV2 Negative 03/03/2021    SARSCOV2 Negative 12/07/2020     Past Medical History:   Diagnosis Date    GERD (gastroesophageal reflux disease)     Migraine     takes excederine migraine     Past Surgical History:   Procedure Laterality Date    DENTAL SURGERY       Current Outpatient Medications   Medication Sig Dispense Refill    aspirin-acetaminophen-caffeine (EXCEDRIN MIGRAINE) 250-250-65 MG per tablet Take 1 tablet by mouth every 6 (six) hours as needed for headaches      benzonatate (TESSALON PERLES) 100 mg capsule Take 1 capsule (100 mg total) by mouth 3 (three) times a day as needed for cough for up to 7 days 21 capsule 0    levonorgestrel (MIRENA) 20 MCG/24HR IUD 1 each by Intrauterine route once      fluticasone (FLONASE) 50 mcg/act nasal spray 2 sprays into each nostril daily 1 Bottle 0    loratadine (CLARITIN) 10 mg tablet Take 1 tablet (10 mg total) by mouth daily 10 tablet 0    medroxyPROGESTERone (DEPO-PROVERA) 150 mg/mL injection Inject 1 mL (150 mg total) into a muscle every 3 (three) months (Patient not taking: Reported on 3/5/2021) 1 mL 0    methocarbamol (ROBAXIN) 750 mg tablet Take 1 tablet (750 mg total) by mouth 3 (three) times a day as needed for muscle spasms (Patient not taking: Reported on 2/15/2021) 30 tablet 0    omeprazole (PriLOSEC) 20 mg delayed release capsule Take 1 capsule (20 mg total) by mouth daily 30 capsule 1     No current facility-administered medications for this visit  Allergies   Allergen Reactions    Other Swelling     Patient states that she gets hives and swells when she comes in contact with clorox or bleach products  Review of Systems   Constitutional: Negative for chills and fever  HENT: Positive for congestion and rhinorrhea  Negative for sore throat  Respiratory: Positive for cough (dry, more so with talking)  Negative for chest tightness and shortness of breath  Gastrointestinal: Negative for abdominal pain, diarrhea, nausea and vomiting  Musculoskeletal: Negative for myalgias  Neurological: Positive for headaches (last week, resolved since)       Objective:    Vitals:    03/05/21 0917   Weight: 59 kg (130 lb)   Height: 4' 11" (1 499 m)       Physical Exam  Constitutional: General: She is not in acute distress  Appearance: She is not ill-appearing, toxic-appearing or diaphoretic  HENT:      Head: Normocephalic and atraumatic  Pulmonary:      Effort: Pulmonary effort is normal    Neurological:      Mental Status: She is alert and oriented to person, place, and time  Psychiatric:         Mood and Affect: Mood normal          Behavior: Behavior normal      Patient talking and breathing comfortably without any obvious distress or gasping for air, no cough throughout visits and no pauses in communication for deep breath  VIRTUAL VISIT DISCLAIMER    Imani Xiao acknowledges that she has consented to an online visit or consultation  She understands that the online visit is based solely on information provided by her, and that, in the absence of a face-to-face physical evaluation by the physician, the diagnosis she receives is both limited and provisional in terms of accuracy and completeness  This is not intended to replace a full medical face-to-face evaluation by the physician  Imani Xiao understands and accepts these terms

## 2021-04-01 DIAGNOSIS — J06.9 UPPER RESPIRATORY TRACT INFECTION, UNSPECIFIED TYPE: ICD-10-CM

## 2021-04-01 RX ORDER — FLUTICASONE PROPIONATE 50 MCG
SPRAY, SUSPENSION (ML) NASAL
Qty: 16 ML | OUTPATIENT
Start: 2021-04-01

## 2021-04-01 NOTE — TELEPHONE ENCOUNTER
I am receiving on a refill request for Flonase nasal spray which I prescribed for patient 1 time for acute URI symptoms  Please see if patient truly needs a refill otherwise I did refuse medications since this is only a temporary medicine

## 2021-04-14 ENCOUNTER — OFFICE VISIT (OUTPATIENT)
Dept: OBGYN CLINIC | Facility: CLINIC | Age: 46
End: 2021-04-14

## 2021-04-14 VITALS
WEIGHT: 139 LBS | DIASTOLIC BLOOD PRESSURE: 81 MMHG | HEIGHT: 59 IN | BODY MASS INDEX: 28.02 KG/M2 | HEART RATE: 89 BPM | SYSTOLIC BLOOD PRESSURE: 118 MMHG

## 2021-04-14 DIAGNOSIS — N75.0 BARTHOLIN GLAND CYST: Primary | ICD-10-CM

## 2021-04-14 PROCEDURE — 56420 I&D BARTHOLINS GLAND ABSCESS: CPT | Performed by: NURSE PRACTITIONER

## 2021-04-14 RX ORDER — TERBINAFINE HYDROCHLORIDE 250 MG/1
250 TABLET ORAL DAILY
COMMUNITY
Start: 2021-03-04 | End: 2021-04-14

## 2021-04-14 NOTE — PROGRESS NOTES
Incision and drain    Date/Time: 2021 4:44 PM  Performed by: LULU Alexander  Authorized by: LULU Alexander   Universal Protocol:  Consent: Verbal consent obtained  Written consent obtained  Consent given by: patient  Time out: Immediately prior to procedure a "time out" was called to verify the correct patient, procedure, equipment, support staff and site/side marked as required  Timeout called at: 2021 4:35 PM   Patient understanding: patient states understanding of the procedure being performed  Patient consent: the patient's understanding of the procedure matches consent given  Procedure consent: procedure consent matches procedure scheduled  Relevant documents: relevant documents present and verified  Test results: test results not available  Site marked: the operative site was marked  Radiology Images displayed and confirmed  If images not available, report reviewed: imaging studies not available  Patient identity confirmed: verbally with patient      Patient location:  Bedside  Location:     Type:  Bartholin cyst    Location:  Anogenital    Anogenital location:  Bartholin's gland  Pre-procedure details:     Skin preparation:  Betadine  Anesthesia (see MAR for exact dosages): Anesthesia method:  Local infiltration    Local anesthetic:  Lidocaine 1% WITH epi  Procedure details:     Complexity:  Simple    Needle aspiration: no      Incision types:  Stab incision    Scalpel blade:  11    Approach:  Open    Incision depth:  Subcutaneous    Wound management:  Probed and deloculated    Drainage:  Purulent and bloody    Drainage amount: Moderate    Wound treatment:  Wound left open    Packing materials:  None  Post-procedure details:     Patient tolerance of procedure: Tolerated well, no immediate complications  Comments:       here for bartholin Gland cyst incision and drainage  Risks, benefits and alternatives reviewed    Risks including return of cyst, infection, bleeding and pain reviewed  Patient verbalizes understanding and desires I&D at today's visit  Tolerated well    Signs and symptoms report reviewed  RTO as scheduled for annual & follow up

## 2021-04-14 NOTE — PATIENT INSTRUCTIONS
Bartholin Cyst   WHAT YOU NEED TO KNOW:   What is a Bartholin cyst?  A Bartholin cyst is a lump near the opening to your vagina  You may have pain in this area when you walk or have sex  A Bartholin cyst is caused by blockage of your Bartholin gland  You have a Bartholin gland on each side of your vagina  The glands produce fluid to moisten your vagina  Over time the fluid can build up in the gland and form a cyst  The cyst may become infected  You may be at risk for a Bartholin cyst if you have a sexually transmitted infection  An injury or surgery near your vagina may also increase you risk  How is a Bartholin cyst diagnosed and treated? Your healthcare provider will examine your vagina  A sample of fluid from your vagina may be collected  The fluid can be tested for sexually transmitted infections  Your healthcare provider may tell you how to treat your cyst at home  Instead, you may need any of the following:  · Medicine  may be given to treat or prevent an infection  Medicine may also be given to decrease pain and swelling  · Incision and drainage  is a procedure to drain the cyst  Your healthcare provider will make an opening in the cyst so it can drain  He or she may also place packing or a drain in your wound  The drain will help keep your gland open and drain extra fluid  The packing will be removed in 24 to 48 hours  The drain may be left in place for 4 to 6 weeks  · Surgery  may be needed if other treatments do not work  Surgery may be done to hold your gland open and prevent another blockage  Surgery may also be done to remove 1 or both of your Bartholin glands  What can I do to care for myself if my cyst is not drained? · Take a sitz bath  3 to 4 times each day or as directed  A sitz bath may help relieve swelling and pain  It will also help open your Bartholin glands so they drain normally  Place a clean towel on the bottom of your bath tub   Fill your bath tub with warm water up to your hips  You can also buy a sitz bath that fits in your toilet  Sit in the water for 10 minutes  · Apply a warm compress  to your cyst  This may relieve swelling and pain  A warm compress will also help open your Bartholin glands so they drain normally  Wet a washcloth in warm, but not hot, water  Apply the compress for 10 minutes  Repeat 4 times each day  · Keep the area around your vagina clean  Always wipe front to back  Shower once a day  Gently pat the area dry after a shower  What can I do to care for myself after my cyst is drained? · Take a sitz bath  in 24 to 48 hours or as directed  You may need to wait to take a sitz bath until after your packing is removed  A sitz bath may help relieve swelling and pain  Take a sitz bath 3 to 4 times each day for 3 days  Place a clean towel on the bottom of your bath tub  Fill your bath tub with warm water up to your hips  You can also buy a sitz bath that fits in your toilet  Sit in the water for 10 minutes  · Wear a sanitary pad  to absorb drainage from your wound  You may have drainage for a few weeks after your cyst is drained  · Ask your healthcare provider if it is okay to have sex  Sex may cause your drain to fall out  It may also increase your risk for an infection  · Keep the area around your vagina clean  Always wipe front to back  Shower once a day  Gently pat the area dry after a shower  When should I contact my gynecologist or healthcare provider? · You have a fever  · Your cyst gets larger or becomes more painful  · Your cyst returns after treatment  · Your drain falls out  · You have pus, redness, or swelling where the cyst was drained  · You have questions or concerns about your condition or care  CARE AGREEMENT:   You have the right to help plan your care  Learn about your health condition and how it may be treated   Discuss treatment options with your healthcare providers to decide what care you want to receive  You always have the right to refuse treatment  The above information is an  only  It is not intended as medical advice for individual conditions or treatments  Talk to your doctor, nurse or pharmacist before following any medical regimen to see if it is safe and effective for you  © Copyright [address] Information is for End User's use only and may not be sold, redistributed or otherwise used for commercial purposes   All illustrations and images included in CareNotes® are the copyrighted property of A D A M , Inc  or [address] [place] [address]

## 2021-04-29 ENCOUNTER — OFFICE VISIT (OUTPATIENT)
Dept: OBGYN CLINIC | Facility: CLINIC | Age: 46
End: 2021-04-29

## 2021-04-29 ENCOUNTER — IMMUNIZATIONS (OUTPATIENT)
Dept: FAMILY MEDICINE CLINIC | Facility: HOSPITAL | Age: 46
End: 2021-04-29

## 2021-04-29 VITALS
DIASTOLIC BLOOD PRESSURE: 69 MMHG | HEART RATE: 100 BPM | SYSTOLIC BLOOD PRESSURE: 127 MMHG | WEIGHT: 137.8 LBS | HEIGHT: 59 IN | BODY MASS INDEX: 27.78 KG/M2

## 2021-04-29 DIAGNOSIS — Z23 ENCOUNTER FOR IMMUNIZATION: Primary | ICD-10-CM

## 2021-04-29 DIAGNOSIS — N93.9 ABNORMAL UTERINE BLEEDING: Primary | ICD-10-CM

## 2021-04-29 PROCEDURE — 0001A SARS-COV-2 / COVID-19 MRNA VACCINE (PFIZER-BIONTECH) 30 MCG: CPT

## 2021-04-29 PROCEDURE — 99213 OFFICE O/P EST LOW 20 MIN: CPT | Performed by: STUDENT IN AN ORGANIZED HEALTH CARE EDUCATION/TRAINING PROGRAM

## 2021-04-29 PROCEDURE — 91300 SARS-COV-2 / COVID-19 MRNA VACCINE (PFIZER-BIONTECH) 30 MCG: CPT

## 2021-04-29 PROCEDURE — 3008F BODY MASS INDEX DOCD: CPT | Performed by: PHYSICIAN ASSISTANT

## 2021-04-29 NOTE — PROGRESS NOTES
ASSESMENT & PLAN:           1  Hx of AUB   - Prior workup 2021 normal TSH, small submucosal fibroid on 21 TVUS, Mirena IUD present (2021), normal menses monthly for 3d, has been irregular the past few months but this resolved with depoprovera IM 2021,    - Discussed watchful monitoring, offered 2nd depo shot today but does not seem necessary as pt has no complaints in regards to her bleeding, low threshold to perform EMB if bleeding reoccurs, discussed possibility of perimenopausal state since pt's mother went into menopause mid 45s  Pt currently denies hot flashes, vaginal dryness, dyspareunia, mood changes, difficulty sleeping  2  Recent bartholin cyst I&D   - 2w ago, healing well, no complaints by patient   - Continue sitz baths  3  RTC   - Next annual or PRN    SUBJECTIVE:             Srini Short is a 41 y o  at [id number] who presents for possible repeat depoprovera shot  She currently has a Mirena IUD in place for birth control  She has had very regular periods, lasting 3d at a time until earlier this year when they began to change and become a few days longer  She was seen in February for AUB and had a TVUS and TSH (normal)  Her Hgb at that time was 14 4g/dL      Review of Systems   Review of Systems   As noted above    OB Hx:  OB History    Para Term  AB Living   5 3 3 0 2 3   SAB TAB Ectopic Multiple Live Births   2 0 0 0 3      # Outcome Date GA Lbr Juanito/2nd Weight Sex Delivery Anes PTL Lv   5 Term 17 40w0d / 00:19 3030 g (6 lb 10 9 oz) M Vag-Spont EPI N CRUZ      Name: Juanita Sarabia  (RAYMOND)      Apgar1: 9  Apgar5: 9   4 SAB /03/15 10w0d          3 SAB /03/08           2 Term / 40w0d  2948 g (6 lb 8 oz) F Vag-Spont   CRUZ   1 Term 98 40w0d  3175 g (7 lb) M Vag-Spont  N CRUZ     Medical Hx  Past Medical History:   Diagnosis Date    GERD (gastroesophageal reflux disease)     Migraine     takes excederine migraine     Surgical Hx  Past Surgical History: Procedure Laterality Date    DENTAL SURGERY       Family Hx  Family History   Problem Relation Age of Onset    Breast cancer Mother         60 y/o    [facility] BRCA1 Negative Mother     Diabetes Father     Hypertension Father     No Known Problems Brother     No Known Problems Daughter     No Known Problems Son     Breast cancer Maternal Grandmother 46    Lung cancer Maternal Grandmother 59    Alzheimer's disease Maternal Grandfather     Heart disease Paternal Grandmother     No Known Problems Paternal Grandfather     No Known Problems Brother     No Known Problems Brother     No Known Problems Son     Lung cancer Maternal Uncle 48    Throat cancer Cousin 39    BRCA1 Positive Cousin     Kidney cancer Cousin 39     Social Hx  Social History     Socioeconomic History    Marital status: /Civil Union     Spouse name: Lyndon Jones    Number of children: 2    Years of education: Not on file    Highest education level: Not on file   Occupational History    Occupation:    Social Needs    Financial resource strain: Somewhat hard    Food insecurity     Worry: Never true     Inability: Sometimes true    Transportation needs     Medical: No     Non-medical: No   Tobacco Use    Smoking status: Never Smoker    Smokeless tobacco: Never Used   Substance and Sexual Activity    Alcohol use: No     Frequency: Never     Binge frequency: Never    Drug use: No    Sexual activity: Yes     Partners: Male     Birth control/protection: I U D  Lifestyle    Physical activity     Days per week: 0 days     Minutes per session: 0 min    Stress:  Only a little   Relationships    Social connections     Talks on phone: More than three times a week     Gets together: More than three times a week     Attends Nondenominational service: More than 4 times per year     Active member of club or organization: No     Attends meetings of clubs or organizations: Never     Relationship status:     Intimate partner violence     Fear of current or ex partner: No     Emotionally abused: No     Physically abused: No     Forced sexual activity: No   Other Topics Concern    Not on file   Social History Narrative    Not on file     Allergies  Allergies   Allergen Reactions    Other Swelling     Patient states that she gets hives and swells when she comes in contact with clorox or bleach products       Meds    Current Outpatient Medications:     aspirin-acetaminophen-caffeine (EXCEDRIN MIGRAINE) 250-250-65 MG per tablet, Take 1 tablet by mouth every 6 (six) hours as needed for headaches, Disp: , Rfl:     levonorgestrel (MIRENA) 20 MCG/24HR IUD, 1 each by Intrauterine route once, Disp: , Rfl:     loratadine (CLARITIN) 10 mg tablet, Take 1 tablet (10 mg total) by mouth daily, Disp: 10 tablet, Rfl: 0    medroxyPROGESTERone (DEPO-PROVERA) 150 mg/mL injection, Inject 1 mL (150 mg total) into a muscle every 3 (three) months, Disp: 1 mL, Rfl: 0    omeprazole (PriLOSEC) 20 mg delayed release capsule, Take 1 capsule (20 mg total) by mouth daily, Disp: 30 capsule, Rfl: 1    fluticasone (FLONASE) 50 mcg/act nasal spray, 2 sprays into each nostril daily (Patient not taking: Reported on 4/29/2021), Disp: 1 Bottle, Rfl: 0    OBJECTIVE:               Vitals  Vitals:    04/29/21 0850   BP: 127/69   Pulse: 100       Physical Exam  No acute distress, pleasant and conversant  Pelvic exam deferred    Johnna Ganser,   PGY-4 OB/GYN   4/29/2021 12:10 PM

## 2021-05-20 ENCOUNTER — IMMUNIZATIONS (OUTPATIENT)
Dept: FAMILY MEDICINE CLINIC | Facility: HOSPITAL | Age: 46
End: 2021-05-20

## 2021-05-20 DIAGNOSIS — Z23 ENCOUNTER FOR IMMUNIZATION: Primary | ICD-10-CM

## 2021-05-20 PROCEDURE — 91300 SARS-COV-2 / COVID-19 MRNA VACCINE (PFIZER-BIONTECH) 30 MCG: CPT

## 2021-05-20 PROCEDURE — 0002A SARS-COV-2 / COVID-19 MRNA VACCINE (PFIZER-BIONTECH) 30 MCG: CPT

## 2021-08-13 ENCOUNTER — HOSPITAL ENCOUNTER (OUTPATIENT)
Facility: HOSPITAL | Age: 46
Setting detail: OBSERVATION
Discharge: HOME | End: 2021-08-14
Attending: EMERGENCY MEDICINE | Admitting: INTERNAL MEDICINE
Payer: COMMERCIAL

## 2021-08-13 DIAGNOSIS — T78.2XXA ANAPHYLAXIS, INITIAL ENCOUNTER: Primary | ICD-10-CM

## 2021-08-13 PROBLEM — M25.552 LEFT HIP PAIN: Status: ACTIVE | Noted: 2021-08-13

## 2021-08-13 PROBLEM — K21.9 GERD (GASTROESOPHAGEAL REFLUX DISEASE): Status: ACTIVE | Noted: 2021-08-13

## 2021-08-13 PROBLEM — G43.909 MIGRAINES: Status: ACTIVE | Noted: 2021-08-13

## 2021-08-13 LAB
PLATELET # BLD AUTO: 240 THOUSANDS/UL (ref 149–390)
PMV BLD AUTO: 10.6 FL (ref 8.9–12.7)

## 2021-08-13 PROCEDURE — 96366 THER/PROPH/DIAG IV INF ADDON: CPT

## 2021-08-13 PROCEDURE — 99219 PR INITIAL OBSERVATION CARE/DAY 50 MINUTES: CPT | Performed by: INTERNAL MEDICINE

## 2021-08-13 PROCEDURE — 99291 CRITICAL CARE FIRST HOUR: CPT | Performed by: EMERGENCY MEDICINE

## 2021-08-13 PROCEDURE — 99284 EMERGENCY DEPT VISIT MOD MDM: CPT

## 2021-08-13 PROCEDURE — 85049 AUTOMATED PLATELET COUNT: CPT | Performed by: STUDENT IN AN ORGANIZED HEALTH CARE EDUCATION/TRAINING PROGRAM

## 2021-08-13 PROCEDURE — 96372 THER/PROPH/DIAG INJ SC/IM: CPT

## 2021-08-13 PROCEDURE — 96365 THER/PROPH/DIAG IV INF INIT: CPT

## 2021-08-13 PROCEDURE — 96375 TX/PRO/DX INJ NEW DRUG ADDON: CPT

## 2021-08-13 RX ORDER — ACETAMINOPHEN 325 MG/1
650 TABLET ORAL EVERY 6 HOURS PRN
Status: DISCONTINUED | OUTPATIENT
Start: 2021-08-13 | End: 2021-08-14 | Stop reason: HOSPADM

## 2021-08-13 RX ORDER — METHYLPREDNISOLONE SODIUM SUCCINATE 125 MG/2ML
60 INJECTION, POWDER, LYOPHILIZED, FOR SOLUTION INTRAMUSCULAR; INTRAVENOUS ONCE
Status: COMPLETED | OUTPATIENT
Start: 2021-08-14 | End: 2021-08-14

## 2021-08-13 RX ORDER — DIPHENHYDRAMINE HYDROCHLORIDE 50 MG/ML
50 INJECTION INTRAMUSCULAR; INTRAVENOUS EVERY 6 HOURS PRN
Status: DISCONTINUED | OUTPATIENT
Start: 2021-08-13 | End: 2021-08-14 | Stop reason: HOSPADM

## 2021-08-13 RX ORDER — SODIUM CHLORIDE, SODIUM GLUCONATE, SODIUM ACETATE, POTASSIUM CHLORIDE, MAGNESIUM CHLORIDE, SODIUM PHOSPHATE, DIBASIC, AND POTASSIUM PHOSPHATE .53; .5; .37; .037; .03; .012; .00082 G/100ML; G/100ML; G/100ML; G/100ML; G/100ML; G/100ML; G/100ML
1000 INJECTION, SOLUTION INTRAVENOUS ONCE
Status: COMPLETED | OUTPATIENT
Start: 2021-08-13 | End: 2021-08-13

## 2021-08-13 RX ORDER — PANTOPRAZOLE SODIUM 40 MG/1
40 TABLET, DELAYED RELEASE ORAL
Status: DISCONTINUED | OUTPATIENT
Start: 2021-08-14 | End: 2021-08-14 | Stop reason: HOSPADM

## 2021-08-13 RX ORDER — LIDOCAINE 50 MG/G
1 PATCH TOPICAL DAILY
Status: DISCONTINUED | OUTPATIENT
Start: 2021-08-13 | End: 2021-08-14 | Stop reason: HOSPADM

## 2021-08-13 RX ORDER — DIPHENHYDRAMINE HYDROCHLORIDE 50 MG/ML
50 INJECTION INTRAMUSCULAR; INTRAVENOUS ONCE
Status: COMPLETED | OUTPATIENT
Start: 2021-08-13 | End: 2021-08-13

## 2021-08-13 RX ORDER — EPINEPHRINE 1 MG/ML
0.3 INJECTION, SOLUTION, CONCENTRATE INTRAVENOUS ONCE AS NEEDED
Status: DISCONTINUED | OUTPATIENT
Start: 2021-08-13 | End: 2021-08-14 | Stop reason: HOSPADM

## 2021-08-13 RX ORDER — SODIUM CHLORIDE 9 MG/ML
200 INJECTION, SOLUTION INTRAVENOUS CONTINUOUS
Status: DISCONTINUED | OUTPATIENT
Start: 2021-08-13 | End: 2021-08-14 | Stop reason: HOSPADM

## 2021-08-13 RX ORDER — EPINEPHRINE 1 MG/ML
0.3 INJECTION, SOLUTION, CONCENTRATE INTRAVENOUS ONCE
Status: COMPLETED | OUTPATIENT
Start: 2021-08-13 | End: 2021-08-13

## 2021-08-13 RX ORDER — DEXAMETHASONE SODIUM PHOSPHATE 4 MG/ML
10 INJECTION, SOLUTION INTRA-ARTICULAR; INTRALESIONAL; INTRAMUSCULAR; INTRAVENOUS; SOFT TISSUE ONCE
Status: COMPLETED | OUTPATIENT
Start: 2021-08-13 | End: 2021-08-13

## 2021-08-13 RX ORDER — BUTALBITAL, ACETAMINOPHEN AND CAFFEINE 50; 325; 40 MG/1; MG/1; MG/1
1 TABLET ORAL EVERY 6 HOURS PRN
Status: DISCONTINUED | OUTPATIENT
Start: 2021-08-13 | End: 2021-08-14 | Stop reason: HOSPADM

## 2021-08-13 RX ORDER — EPINEPHRINE 1 MG/ML
0.5 INJECTION, SOLUTION, CONCENTRATE INTRAVENOUS ONCE
Status: COMPLETED | OUTPATIENT
Start: 2021-08-13 | End: 2021-08-13

## 2021-08-13 RX ADMIN — SODIUM CHLORIDE 200 ML/HR: 0.9 INJECTION, SOLUTION INTRAVENOUS at 19:53

## 2021-08-13 RX ADMIN — SODIUM CHLORIDE 1000 ML: 0.9 INJECTION, SOLUTION INTRAVENOUS at 17:44

## 2021-08-13 RX ADMIN — FAMOTIDINE 20 MG: 10 INJECTION INTRAVENOUS at 11:08

## 2021-08-13 RX ADMIN — SODIUM CHLORIDE, SODIUM GLUCONATE, SODIUM ACETATE, POTASSIUM CHLORIDE, MAGNESIUM CHLORIDE, SODIUM PHOSPHATE, DIBASIC, AND POTASSIUM PHOSPHATE 1000 ML: .53; .5; .37; .037; .03; .012; .00082 INJECTION, SOLUTION INTRAVENOUS at 11:07

## 2021-08-13 RX ADMIN — SODIUM CHLORIDE, SODIUM GLUCONATE, SODIUM ACETATE, POTASSIUM CHLORIDE, MAGNESIUM CHLORIDE, SODIUM PHOSPHATE, DIBASIC, AND POTASSIUM PHOSPHATE 1000 ML: .53; .5; .37; .037; .03; .012; .00082 INJECTION, SOLUTION INTRAVENOUS at 12:42

## 2021-08-13 RX ADMIN — DEXAMETHASONE SODIUM PHOSPHATE 10 MG: 4 INJECTION INTRA-ARTICULAR; INTRALESIONAL; INTRAMUSCULAR; INTRAVENOUS; SOFT TISSUE at 11:13

## 2021-08-13 RX ADMIN — EPINEPHRINE 0.3 MG: 1 INJECTION, SOLUTION, CONCENTRATE INTRAVENOUS at 13:05

## 2021-08-13 RX ADMIN — BUTALBITAL, ACETAMINOPHEN, AND CAFFEINE 1 TABLET: 50; 325; 40 TABLET ORAL at 19:53

## 2021-08-13 RX ADMIN — DIPHENHYDRAMINE HYDROCHLORIDE 50 MG: 50 INJECTION, SOLUTION INTRAMUSCULAR; INTRAVENOUS at 11:08

## 2021-08-13 RX ADMIN — EPINEPHRINE 0.5 MG: 1 INJECTION, SOLUTION, CONCENTRATE INTRAVENOUS at 11:11

## 2021-08-13 NOTE — QUICK NOTE
Patient seen and examined on arrival to the floor  Says that she feels well, she is lying comfortably in bed  She endorses some heaviness in her legs and hands but otherwise feels okay  Denies shortness of breath or itchiness  Blood pressure running in the high 90s to low 559 systolic  Heart rate in the low 100s, but suspect this may be secondary to her EpiPen  Patient did not receive her 3rd bolus  Will recheck blood pressure after 3rd bolus and continue to monitor on continuous fluids

## 2021-08-13 NOTE — ED PROVIDER NOTES
History  Chief Complaint   Patient presents with    Allergic Reaction     pt reports at 0915 she got "bit"  patient is unaware of what bit her  states she has burning systemically  Patient is a 52-year-old female, no significant past medical history, who presents to the emergency department for an allergic reaction after a possible insect bite  Patient states she was walking outside to her car about 1 5 ago when she felt something bite/sting her right forearm  She immediately felt pain, but otherwise felt ok  However, several minutes later, she developed a diffuse itchy rash of her legs, arms, chest and face, lightheadedness, nausea, and mild abdominal discomfort  She was subsequently brought to the emergency department for further evaluation  Patient denies any prior history of allergic reactions or anaphylaxis to meds, foods, or insects  Currently, she denies any chest pain, shortness of breath, tongue swelling, difficulty swallowing, changes in speech,  vomiting, diarrhea, or any other new or concerning symptoms  Prior to Admission Medications   Prescriptions Last Dose Informant Patient Reported?  Taking?   aspirin-acetaminophen-caffeine ([address] [place] [address]) 250-250-65 MG per tablet Past Month at Unknown time  Yes Yes   Sig: Take 1 tablet by mouth every 6 (six) hours as needed for headaches   fluticasone (FLONASE) 50 mcg/act nasal spray Not Taking at Unknown time  No No   Si sprays into each nostril daily   Patient not taking: Reported on 2021   levonorgestrel (MIRENA) 20 MCG/24HR IUD 2021 at Unknown time  Yes Yes   Si each by Intrauterine route once   loratadine (CLARITIN) 10 mg tablet Not Taking at Unknown time  No No   Sig: Take 1 tablet (10 mg total) by mouth daily   Patient not taking: Reported on 2021   medroxyPROGESTERone (DEPO-PROVERA) 150 mg/mL injection Not Taking at Unknown time  No No   Sig: Inject 1 mL (150 mg total) into a muscle every 3 (three) months Patient not taking: Reported on 8/13/2021   omeprazole (PriLOSEC) 20 mg delayed release capsule 8/12/2021 at Unknown time  No Yes   Sig: Take 1 capsule (20 mg total) by mouth daily      Facility-Administered Medications: None       Past Medical History:   Diagnosis Date    GERD (gastroesophageal reflux disease)     Migraine     takes excederine migraine       Past Surgical History:   Procedure Laterality Date    DENTAL SURGERY         Family History   Problem Relation Age of Onset    Breast cancer Mother         60 y/o    Lilia Day BRCA1 Negative Mother     Diabetes Father     Hypertension Father     No Known Problems Brother     No Known Problems Daughter     No Known Problems Son     Breast cancer Maternal Grandmother 46    Lung cancer Maternal Grandmother 59    Alzheimer's disease Maternal Grandfather     Heart disease Paternal Grandmother     No Known Problems Paternal Grandfather     No Known Problems Brother     No Known Problems Brother     No Known Problems Son     Lung cancer Maternal Uncle 48    Throat cancer Cousin 39    BRCA1 Positive Cousin     Kidney cancer Cousin 39     I have reviewed and agree with the history as documented  E-Cigarette/Vaping    E-Cigarette Use Never User      E-Cigarette/Vaping Substances    Nicotine No     THC No     CBD No     Flavoring No     Other No     Unknown No      Social History     Tobacco Use    Smoking status: Never Smoker    Smokeless tobacco: Never Used   Vaping Use    Vaping Use: Never used   Substance Use Topics    Alcohol use: Not Currently    Drug use: No        Review of Systems   Constitutional: Negative for chills and fever  HENT: Negative for sore throat, trouble swallowing and voice change  Eyes: Negative for redness  Respiratory: Negative for cough and shortness of breath  Cardiovascular: Negative for chest pain and leg swelling  Gastrointestinal: Positive for abdominal pain and nausea   Negative for diarrhea and vomiting  Musculoskeletal: Negative for back pain  Skin: Positive for rash and wound  Neurological: Positive for light-headedness  Negative for headaches  All other systems reviewed and are negative  Physical Exam  ED Triage Vitals   Temperature Pulse Respirations Blood Pressure SpO2   08/13/21 1057 08/13/21 1055 08/13/21 1055 08/13/21 1057 08/13/21 1055   97 9 °F (36 6 °C) 78 18 (!) 86/47 100 %      Temp Source Heart Rate Source Patient Position - Orthostatic VS BP Location FiO2 (%)   08/13/21 1057 08/13/21 1055 08/13/21 1120 08/13/21 1055 --   Oral Monitor Lying Right arm       Pain Score       08/13/21 1600       No Pain             Orthostatic Vital Signs  Vitals:    08/13/21 1946 08/13/21 1947 08/13/21 1948 08/13/21 2248   BP:    112/57   Pulse: 103 101 102 92   Patient Position - Orthostatic VS:           Physical Exam  Vitals and nursing note reviewed  Constitutional:       General: She is not in acute distress  Appearance: She is well-developed  She is not diaphoretic  HENT:      Head: Normocephalic and atraumatic  Right Ear: External ear normal       Left Ear: External ear normal       Nose: Nose normal       Mouth/Throat:      Mouth: Mucous membranes are moist  No oral lesions  Pharynx: Oropharynx is clear  Uvula midline  No pharyngeal swelling, oropharyngeal exudate, posterior oropharyngeal erythema or uvula swelling  Comments: Mild swelling of the lips  No intraoral swelling  Eyes:      General: Lids are normal  No scleral icterus  Cardiovascular:      Rate and Rhythm: Normal rate and regular rhythm  Heart sounds: Normal heart sounds  No murmur heard  No friction rub  No gallop  Pulmonary:      Effort: Pulmonary effort is normal  No respiratory distress  Breath sounds: Normal breath sounds  No stridor or decreased air movement  No decreased breath sounds, wheezing or rales  Abdominal:      Palpations: Abdomen is soft  Tenderness:  There is no abdominal tenderness  There is no guarding or rebound  Musculoskeletal:         General: No deformity  Normal range of motion  Skin:     General: Skin is warm and dry  Findings: Lesion and rash present  Comments: There is a small, 2-3 mm hyper pigmented macule on the anterior right forearm which is were patient states she felt something bite her  There is a diffuse, erythematous rash with patches of urticaria over the proximal anterior thighs, upper chest and neck, rico oral, and bilateral upper extremities  Neurological:      General: No focal deficit present  Mental Status: She is alert     Psychiatric:         Mood and Affect: Mood normal          Behavior: Behavior normal          ED Medications  Medications   butalbital-acetaminophen-caffeine (FIORICET,ESGIC) -40 mg per tablet 1 tablet (1 tablet Oral Given 8/13/21 1953)   pantoprazole (PROTONIX) EC tablet 40 mg (has no administration in time range)   enoxaparin (LOVENOX) subcutaneous injection 40 mg (40 mg Subcutaneous Not Given 8/13/21 1831)   sodium chloride 0 9 % infusion (200 mL/hr Intravenous New Bag 8/13/21 1953)   EPINEPHrine PF (ADRENALIN) 1 mg/mL injection 0 3 mg (has no administration in time range)   diphenhydrAMINE (BENADRYL) injection 50 mg (has no administration in time range)   methylPREDNISolone sodium succinate (Solu-MEDROL) injection 60 mg (has no administration in time range)   lidocaine (LIDODERM) 5 % patch 1 patch (1 patch Topical Not Given 8/13/21 1843)   acetaminophen (TYLENOL) tablet 650 mg (has no administration in time range)   diphenhydrAMINE (BENADRYL) injection 50 mg (50 mg Intravenous Given 8/13/21 1108)   famotidine (PEPCID) injection 20 mg (20 mg Intravenous Given 8/13/21 1108)   EPINEPHrine PF (ADRENALIN) 1 mg/mL injection 0 5 mg (0 5 mg Intramuscular Given 8/13/21 1111)   multi-electrolyte (ISOLYTE-S PH 7 4) bolus 1,000 mL (0 mL Intravenous Stopped 8/13/21 1241)   dexamethasone (DECADRON) injection 10 mg (10 mg Intravenous Given 8/13/21 1113)   multi-electrolyte (ISOLYTE-S PH 7 4) bolus 1,000 mL (1,000 mL Intravenous New Bag 8/13/21 1242)   EPINEPHrine PF (ADRENALIN) 1 mg/mL injection 0 3 mg (0 3 mg Intramuscular Given 8/13/21 1305)   sodium chloride 0 9 % bolus 1,000 mL (1,000 mL Intravenous New Bag 8/13/21 1744)       Diagnostic Studies  Results Reviewed     Procedure Component Value Units Date/Time    Platelet count [[id number]]  (Normal) Collected: 08/13/21 1706    Lab Status: Final result Specimen: Blood from Arm, Left Updated: 08/13/21 1716     Platelets 604 Thousands/uL      MPV 10 6 fL                  No orders to display         Procedures  Procedures      ED Course  ED Course as of Aug 13 2322   Fri Aug 13, 2021   1100 Patient hypotensive  Will give epi, steroids, H1/H2, fluids      1100 Blood Pressure(!): 86/47   1118 Patient re-evaluated  Rash improving  Pressure improving  Will continue to monitor  1120 Blood Pressure: 97/65   1131 Patient re-evaluated  Resting comfortably  Rash resolved      1158 Patient re-evaluated  Resting comfortably  Sleepy, but overall feels better  Lightheadedness resolved  Will continue to monitor and re-evaluate  1300       Patient remains asymptomatic, but continues to be hypotensive  Will give 0 3mg of epi and admit for observation  Patient verbalized understanding and agreed with plan of care  MDM  Number of Diagnoses or Management Options  Diagnosis management comments: Patient is a 40 y o  female who presents to the ED for an allergic reaction after a possible insect bite  Significant physical exam findings include a diffuse erythematous rash with urticaria, as well as a dark hyperpigmented macules over the right anterior forearm  BP noted to be decreased upon initial evaluation  Clinical impression is anaphylaxis  Will give epinephrine, H1/H2 blockers, steroids, and IV fluids    Plan for frequent reassessment  If asymptomatic after 3-4 hours, plan to DC  Otherwise, will admit  Portions of the record may have been created with voice recognition software  Occasional wrong word or "sound a like" substitutions may have occurred due to the inherent limitations of voice recognition software  Read the chart carefully and recognize, using context, where substitutions have occurred  Risk of Complications, Morbidity, and/or Mortality  Presenting problems: high  Diagnostic procedures: minimal  Management options: high    Patient Progress  Patient progress: stable      Disposition  Final diagnoses:   Anaphylaxis, initial encounter     Time reflects when diagnosis was documented in both MDM as applicable and the Disposition within this note     Time User Action Codes Description Comment    8/13/2021  1:02 PM Sara Rose, [address] [place] [address]  2XXA] Anaphylaxis, initial encounter       ED Disposition     ED Disposition Condition Date/Time Comment    Admit Stable Fri Aug 13, 2021  1:01 PM Case was discussed with and the patient's admission status was agreed to be Admission Status: observation status to the service of Dr Lexie Bravo up With Specialties Details Why Don Warren, C[facility]N Internal Medicine, Physician Assistant Follow up We will be setting up a follow-up doctor's visit for you after leaving the hospital  [facility]  [address] [address] [address] [place], DO Internal Medicine   [facility]  [address] [facility]  [phone]            Current Discharge Medication List      CONTINUE these medications which have NOT CHANGED    Details   aspirin-acetaminophen-caffeine (EXCEDRIN MIGRAINE) 250-250-65 MG per tablet Take 1 tablet by mouth every 6 (six) hours as needed for headaches      levonorgestrel (MIRENA) 20 MCG/24HR IUD 1 each by Intrauterine route once      omeprazole (PriLOSEC) 20 mg delayed release capsule Take 1 capsule (20 mg total) by mouth daily  Qty: 30 capsule, Refills: 1    Associated Diagnoses: Gastroesophageal reflux disease, unspecified whether esophagitis present      fluticasone (FLONASE) 50 mcg/act nasal spray 2 sprays into each nostril daily  Qty: 1 Bottle, Refills: 0    Associated Diagnoses: Upper respiratory tract infection, unspecified type      loratadine (CLARITIN) 10 mg tablet Take 1 tablet (10 mg total) by mouth daily  Qty: 10 tablet, Refills: 0    Associated Diagnoses: Upper respiratory tract infection, unspecified type      medroxyPROGESTERone (DEPO-PROVERA) 150 mg/mL injection Inject 1 mL (150 mg total) into a muscle every 3 (three) months  Qty: 1 mL, Refills: 0    Associated Diagnoses: Abnormal uterine bleeding (AUB)           No discharge procedures on file  PDMP Review     None           ED Provider  Attending physically available and evaluated Eran Reyes I managed the patient along with the ED Attending      Electronically Signed by         Reggie Gilliam,   08/13/21 [address] [place] [address], DO  08/13/21 Mirna 207, DO  08/13/21 Mirna 207, DO  08/13/21 8506
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
## 2021-08-13 NOTE — LETTER
[address] [facility] [address]  [address] [place] [address]  Dept: [phone]    August 14, 2021     Patient: Roger Grigbsy   YOB: 1975   Date of Visit: 8/13/2021       To Whom it May Concern:    Whitney Tucker is under my professional care  She was seen in the hospital from 8/13/21 to 08/14/21 for a life-threatening reaction that required hospital admission and treatment  She may return to school on 8/15/21 without limitations  If you have any questions or concerns, please don't hesitate to call           Sincerely,          Lauren Chen, DO

## 2021-08-13 NOTE — ASSESSMENT & PLAN NOTE
Patient has no prior history of anaphylaxis  Presents to the ED this admission with evidence of anaphylaxis due to insect bite    S/p 2 doses IV epinephrine, 2 L isolyte boluses, 1 dose IV dexamethasone, 1 dose IV famotidine, 1 dose IV Benadryl    Currently hemodynamically stable with sinus tachycardia    --aggressive volume expansion--> will give 1 L bolus of normal saline + IVF NS 200cc/h  * normal saline is the preferred isotonic fluid an anaphylactic reactions and should be used moving forward  --epinephrine IV p r n  for any further anaphylaxis  --benadryl p r n  for any further symptoms of pruritus and rash  --will give a dose of methylprednisolone 1mg/kg IV tomorrow to help prevent biphasic reaction  --discharge with EpiPen to take home  --on discharge provide follow-up for an allergist's referral for further testing    Outpatient plan:  --will discharge patient with multiple EpiPens to take home with her, the RN staff will show her how to use it  --counseled her to avoid getting near insects such as bees and other hymenoptera  --if any more issues with allergic reactions or anaphylaxis outside of insects, consider allergy specialist referral

## 2021-08-13 NOTE — ED ATTENDING ATTESTATION
8/13/2021  IMario MD, saw and evaluated the patient  I have discussed the patient with the resident/non-physician practitioner and agree with the resident's/non-physician practitioner's findings, Plan of Care, and MDM as documented in the resident's/non-physician practitioner's note, except where noted  All available labs and Radiology studies were reviewed  I was present for key portions of any procedure(s) performed by the resident/non-physician practitioner and I was immediately available to provide assistance  At this point I agree with the current assessment done in the Emergency Department    I have conducted an independent evaluation of this patient a history and physical is as follows:  Patient presents for evaluation of allergic reaction acute she was bitten by something on her right forearm she thinks it was a bee she has diffuse urticaria she feels somewhat lightheaded she has no headache she has no respiratory symptoms she does have some nausea  Patient's blood pressure is somewhat low however she is awake alert oriented she is mentating normally HEENT she has diffuse hive-like rash on her face her throat is clear mucous members are moist her voice is normal neck is shows no JVD lungs are clear with no wheezing heart is regular with no murmurs abdomen soft nontender skin she has diffuse urticaria confluency  Impression acute allergic reaction with mild hypotension patient will require IM epinephrine steroids Benadryl H2 blocker she will be observed in the emergency room  ED Course         Critical Care Time  Procedures    The patient presented with a condition in which there was a high probability of imminent or life-threatening deterioration, and critical care services (excluding separately billable procedures and total teaching time ) total  32  minutes
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
## 2021-08-13 NOTE — ASSESSMENT & PLAN NOTE
Patient has not had migraines in a long time  She uses Excedrin as needed  --will provide Fioricet combination tablet p r n   If needed

## 2021-08-13 NOTE — ASSESSMENT & PLAN NOTE
Symptoms are well controlled with omeprazole    No current symptomatology  --continue pantoprazole 40 mg q d  while inpatient

## 2021-08-14 VITALS
TEMPERATURE: 99.1 F | OXYGEN SATURATION: 96 % | DIASTOLIC BLOOD PRESSURE: 66 MMHG | SYSTOLIC BLOOD PRESSURE: 115 MMHG | RESPIRATION RATE: 16 BRPM | HEART RATE: 73 BPM

## 2021-08-14 LAB
ANION GAP SERPL CALCULATED.3IONS-SCNC: 4 MMOL/L (ref 4–13)
BASOPHILS # BLD AUTO: 0.02 THOUSANDS/ΜL (ref 0–0.1)
BASOPHILS NFR BLD AUTO: 0 % (ref 0–1)
BUN SERPL-MCNC: 9 MG/DL (ref 5–25)
CALCIUM SERPL-MCNC: 8.1 MG/DL (ref 8.3–10.1)
CHLORIDE SERPL-SCNC: 116 MMOL/L (ref 100–108)
CO2 SERPL-SCNC: 20 MMOL/L (ref 21–32)
CREAT SERPL-MCNC: 0.48 MG/DL (ref 0.6–1.3)
EOSINOPHIL # BLD AUTO: 0 THOUSAND/ΜL (ref 0–0.61)
EOSINOPHIL NFR BLD AUTO: 0 % (ref 0–6)
ERYTHROCYTE [DISTWIDTH] IN BLOOD BY AUTOMATED COUNT: 12.7 % (ref 11.6–15.1)
GFR SERPL CREATININE-BSD FRML MDRD: 119 ML/MIN/1.73SQ M
GLUCOSE SERPL-MCNC: 131 MG/DL (ref 65–140)
HCT VFR BLD AUTO: 36.5 % (ref 34.8–46.1)
HGB BLD-MCNC: 12.2 G/DL (ref 11.5–15.4)
IMM GRANULOCYTES # BLD AUTO: 0.1 THOUSAND/UL (ref 0–0.2)
IMM GRANULOCYTES NFR BLD AUTO: 1 % (ref 0–2)
LYMPHOCYTES # BLD AUTO: 1.28 THOUSANDS/ΜL (ref 0.6–4.47)
LYMPHOCYTES NFR BLD AUTO: 7 % (ref 14–44)
MCH RBC QN AUTO: 31.2 PG (ref 26.8–34.3)
MCHC RBC AUTO-ENTMCNC: 33.4 G/DL (ref 31.4–37.4)
MCV RBC AUTO: 93 FL (ref 82–98)
MONOCYTES # BLD AUTO: 0.71 THOUSAND/ΜL (ref 0.17–1.22)
MONOCYTES NFR BLD AUTO: 4 % (ref 4–12)
NEUTROPHILS # BLD AUTO: 16.19 THOUSANDS/ΜL (ref 1.85–7.62)
NEUTS SEG NFR BLD AUTO: 88 % (ref 43–75)
NRBC BLD AUTO-RTO: 0 /100 WBCS
PLATELET # BLD AUTO: 221 THOUSANDS/UL (ref 149–390)
PMV BLD AUTO: 12 FL (ref 8.9–12.7)
POTASSIUM SERPL-SCNC: 3.8 MMOL/L (ref 3.5–5.3)
RBC # BLD AUTO: 3.91 MILLION/UL (ref 3.81–5.12)
SODIUM SERPL-SCNC: 140 MMOL/L (ref 136–145)
WBC # BLD AUTO: 18.3 THOUSAND/UL (ref 4.31–10.16)

## 2021-08-14 PROCEDURE — 80048 BASIC METABOLIC PNL TOTAL CA: CPT | Performed by: STUDENT IN AN ORGANIZED HEALTH CARE EDUCATION/TRAINING PROGRAM

## 2021-08-14 PROCEDURE — 99217 PR OBSERVATION CARE DISCHARGE MANAGEMENT: CPT | Performed by: INTERNAL MEDICINE

## 2021-08-14 PROCEDURE — 85025 COMPLETE CBC W/AUTO DIFF WBC: CPT | Performed by: STUDENT IN AN ORGANIZED HEALTH CARE EDUCATION/TRAINING PROGRAM

## 2021-08-14 RX ORDER — DIPHENHYDRAMINE HCL 50 MG
50 CAPSULE ORAL EVERY 6 HOURS PRN
Qty: 12 CAPSULE | Refills: 0 | Status: SHIPPED | OUTPATIENT
Start: 2021-08-14 | End: 2022-05-13 | Stop reason: ALTCHOICE

## 2021-08-14 RX ORDER — PREDNISONE 50 MG/1
50 TABLET ORAL ONCE
Qty: 1 TABLET | Refills: 0 | Status: SHIPPED | OUTPATIENT
Start: 2021-08-15 | End: 2021-08-15

## 2021-08-14 RX ORDER — EPINEPHRINE 0.3 MG/.3ML
0.3 INJECTION SUBCUTANEOUS ONCE AS NEEDED
Qty: 0.6 ML | Refills: 2 | Status: SHIPPED | OUTPATIENT
Start: 2021-08-14

## 2021-08-14 RX ORDER — EPINEPHRINE 1 MG/ML
0.3 INJECTION, SOLUTION, CONCENTRATE INTRAVENOUS ONCE AS NEEDED
Qty: 1 ML | Refills: 2 | Status: CANCELLED | OUTPATIENT
Start: 2021-08-14

## 2021-08-14 RX ORDER — FAMOTIDINE 20 MG/1
20 TABLET, FILM COATED ORAL DAILY PRN
Qty: 2 TABLET | Refills: 0 | Status: SHIPPED | OUTPATIENT
Start: 2021-08-14 | End: 2021-09-08

## 2021-08-14 RX ADMIN — SODIUM CHLORIDE 200 ML/HR: 0.9 INJECTION, SOLUTION INTRAVENOUS at 00:51

## 2021-08-14 RX ADMIN — METHYLPREDNISOLONE SODIUM SUCCINATE 60 MG: 125 INJECTION, POWDER, FOR SOLUTION INTRAMUSCULAR; INTRAVENOUS at 09:28

## 2021-08-14 RX ADMIN — PANTOPRAZOLE SODIUM 40 MG: 40 TABLET, DELAYED RELEASE ORAL at 05:09

## 2021-08-14 RX ADMIN — ENOXAPARIN SODIUM 40 MG: 40 INJECTION SUBCUTANEOUS at 09:28

## 2021-08-14 RX ADMIN — SODIUM CHLORIDE 200 ML/HR: 0.9 INJECTION, SOLUTION INTRAVENOUS at 05:09
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
## 2021-08-14 NOTE — PLAN OF CARE
Problem: PAIN - ADULT  Goal: Verbalizes/displays adequate comfort level or baseline comfort level  Description: Interventions:  - Encourage patient to monitor pain and request assistance  - Assess pain using appropriate pain scale  - Administer analgesics based on type and severity of pain and evaluate response  - Implement non-pharmacological measures as appropriate and evaluate response  - Consider cultural and social influences on pain and pain management  - Notify physician/advanced practitioner if interventions unsuccessful or patient reports new pain  Outcome: Progressing     Goal: Maintain or return to baseline ADL function  Description: INTERVENTIONS:  -  Assess patient's ability to carry out ADLs; assess patient's baseline for ADL function and identify physical deficits which impact ability to perform ADLs (bathing, care of mouth/teeth, toileting, grooming, dressing, etc )  - Assess/evaluate cause of self-care deficits   - Assess range of motion  - Assess patient's mobility; develop plan if impaired  - Assess patient's need for assistive devices and provide as appropriate  - Encourage maximum independence but intervene and supervise when necessary  - Involve family in performance of ADLs  - Assess for home care needs following discharge   - Consider OT consult to assist with ADL evaluation and planning for discharge  - Provide patient education as appropriate  Outcome: Progressing  Goal: Maintains/Returns to pre admission functional level  Description: INTERVENTIONS:  - Perform BMAT or MOVE assessment daily    - Set and communicate daily mobility goal to care team and patient/family/caregiver  - Collaborate with rehabilitation services on mobility goals if consulted  - Perform Range of Motion 2 times a day  - Reposition patient every 2 hours    - Dangle patient 2 times a day  - Stand patient 2 times a day  - Ambulate patient 2 times a day  - Out of bed to chair 2 times a day   - Out of bed for meals 2 times a day  - Out of bed for toileting  - Record patient progress and toleration of activity level   Outcome: Progressing     Problem: DISCHARGE PLANNING  Goal: Discharge to home or other facility with appropriate resources  Description: INTERVENTIONS:  - Identify barriers to discharge w/patient and caregiver  - Arrange for needed discharge resources and transportation as appropriate  - Identify discharge learning needs (meds, wound care, etc )  - Arrange for interpretive services to assist at discharge as needed  - Refer to Case Management Department for coordinating discharge planning if the patient needs post-hospital services based on physician/advanced practitioner order or complex needs related to functional status, cognitive ability, or social support system  Outcome: Progressing     Problem: Knowledge Deficit  Goal: Patient/family/caregiver demonstrates understanding of disease process, treatment plan, medications, and discharge instructions  Description: Complete learning assessment and assess knowledge base  Interventions:  - Provide teaching at level of understanding  - Provide teaching via preferred learning methods  Outcome: Progressing     Problem: Nutrition/Hydration-ADULT  Goal: Nutrient/Hydration intake appropriate for improving, restoring or maintaining nutritional needs  Description: Monitor and assess patient's nutrition/hydration status for malnutrition  Collaborate with interdisciplinary team and initiate plan and interventions as ordered  Monitor patient's weight and dietary intake as ordered or per policy  Utilize nutrition screening tool and intervene as necessary  Determine patient's food preferences and provide high-protein, high-caloric foods as appropriate       INTERVENTIONS:  - Monitor oral intake, urinary output, labs, and treatment plans  - Assess nutrition and hydration status and recommend course of action  - Evaluate amount of meals eaten  - Assist patient with eating if necessary   - Allow adequate time for meals  - Recommend/ encourage appropriate diets, oral nutritional supplements, and vitamin/mineral supplements  - Order, calculate, and assess calorie counts as needed  - Recommend, monitor, and adjust tube feedings and TPN/PPN based on assessed needs  - Assess need for intravenous fluids  - Provide specific nutrition/hydration education as appropriate  - Include patient/family/caregiver in decisions related to nutrition  Outcome: Progressing

## 2021-08-16 ENCOUNTER — TRANSITIONAL CARE MANAGEMENT (OUTPATIENT)
Dept: INTERNAL MEDICINE CLINIC | Facility: CLINIC | Age: 46
End: 2021-08-16

## 2021-08-19 ENCOUNTER — OFFICE VISIT (OUTPATIENT)
Dept: INTERNAL MEDICINE CLINIC | Facility: CLINIC | Age: 46
End: 2021-08-19

## 2021-08-19 VITALS
TEMPERATURE: 98.1 F | HEIGHT: 59 IN | SYSTOLIC BLOOD PRESSURE: 98 MMHG | WEIGHT: 143 LBS | DIASTOLIC BLOOD PRESSURE: 61 MMHG | BODY MASS INDEX: 28.83 KG/M2 | HEART RATE: 71 BPM

## 2021-08-19 DIAGNOSIS — L30.9 DERMATITIS: ICD-10-CM

## 2021-08-19 DIAGNOSIS — G89.29 CHRONIC LEFT-SIDED LOW BACK PAIN WITH LEFT-SIDED SCIATICA: ICD-10-CM

## 2021-08-19 DIAGNOSIS — M54.42 CHRONIC LEFT-SIDED LOW BACK PAIN WITH LEFT-SIDED SCIATICA: ICD-10-CM

## 2021-08-19 DIAGNOSIS — T78.2XXD ANAPHYLAXIS, SUBSEQUENT ENCOUNTER: Primary | ICD-10-CM

## 2021-08-19 PROCEDURE — 99495 TRANSJ CARE MGMT MOD F2F 14D: CPT | Performed by: PHYSICIAN ASSISTANT

## 2021-08-19 PROCEDURE — 1111F DSCHRG MED/CURRENT MED MERGE: CPT | Performed by: PHYSICIAN ASSISTANT

## 2021-08-19 NOTE — PROGRESS NOTES
Assessment/Plan:      Diagnoses and all orders for this visit:    Anaphylaxis, subsequent encounter    Chronic left-sided low back pain with left-sided sciatica  -     Ambulatory referral to Physical Therapy; Future    Dermatitis  -     triamcinolone (KENALOG) 0 1 % ointment; Apply topically 2 (two) times a day To right forearm rash      47y/o female presenting today for TCM for hospital admission 8/13-8/14 for anaphylaxis to suspected wasp sting right forearm  Pt responded well to treatment, no further swelling, itching or resp sxs  However, she does note a light localized red rash around the sting site x a few days, slightly itchy, has not spread  Pt advised to continue QHS benadryl for itch relief, topical triamcinolone prescribed to apply to affected area BID with continued monitoring  For now, pt to avoid bee sting if possible  Epi pen available as pt p/u from pharmacy - one kept at home and other in her purse  No further workup needed unless more anaphylaxis/reactions noted with from other sources  Pt still c/o left low back pain/left hip pain, into her LLE at times  Advised tylenol/motrin for pain control  No xray warranted at present time  Referral to PT given - pt to make appt  Pt due for annual physical in January 2022, will have her return then  Pt to call or f/u sooner with any more immediate concerns or worsening sxs despite tx  Chief Complaint   Patient presents with    Transition of Care Management     anaphylaxis         Subjective:     Patient ID: Rodrigue Arvizu is a 40 y o  female  TCM Call (since 7/19/2021)     Date and time call was made  8/16/2021 11:02 AM    Hospital care reviewed  Records reviewed    Patient was hospitialized at  [facility]        Date of Admission  08/13/21    Date of discharge  08/14/21    Diagnosis  ANAPHYLAXIS - T78  2XXA    Disposition  Home (Comment)  [address]     Were the patients medications reviewed and updated  Yes    Current Symptoms  -- (Comment)  PT  STATED SHE HAS A LITTLE RASH AROUND   THE AREA OF THE BEE STING  SHE WILL USE BENADRYL CREAM OR SPRAY FOR THE   ITCHING      TCM Call (since 7/19/2021)     Post hospital issues  None    Should patient be enrolled in anticoag monitoring? No    Scheduled for follow up? Yes (Comment)  8/19/2021 WITH [facility] Number    Did you obtain your prescribed medications  Yes (Comment)  USES [facility] ON   [address]     Do you need help managing your prescriptions or medications  No (Comment)    PT  IS ABLE TO MANAGE HER OWN MEDS    Is transportation to your appointment needed  No (Comment)  PT  DRIVES    I have advised the patient to call PCP with any new or worsening symptoms    Renetta Mckay LPN    Living Arrangements  Children    Are you recieving any outpatient services  No    Are you recieving home care services  No    Are you using any community resources  No    Have you fallen in the last 12 months  No    Interperter language line needed  No    Counseling  Patient    Counseling topics  instructions for management; Importance of RX   compliance        45y/o female here today for TCM for hospital admission 8/13, discharge 8/14 [facility]  States she was walking out of her house to an appointment and got stung by a black wasp  It stung her on right forearm  States nothing happened immediately so proceeded to get into car and drive to her appointment  However 5 minutes into the drive she started feeling arms and legs itchy  Then when she arrived at [facility] she noticed a rash starting all over her body, face started feeling weird and swollen then throat started feeling itchy and swollen  States called family to take her to ED  Was kept for 24 hours to monitor her and discharged 8/14/21  Pt reportedly given IV epi, dexamethasone, famotidie and benadryl, 2L isolyte bolus  Methylprednisolone IV day 2 to prevent biphasic reaction   Pt did have hypotension with BP as low as 86/47 with some tachy, with vitals monitored and /58 at discharge  Pt was discharge with epi pen  Since discharge, pt overall reporting no reoccurring swelling or itching  However, States earlier this week she noticed a small red rash with a little itching around the sting on right forearm since her hospital discharge, has not spread  She is taking benadryl QHS  She is not putting any cream on the rash or the sting  No arm pain  She was discharged with epi pens and was able to get those from pharmacy already  Staets she did get stung by small yellow jacket before and no problems  Pt also continues c/o left low back/hip pain that sometimes radiates into her LLE  Lidocaine patch given and advised PT ad PCP f/u  Pt reports still bothering her, pain no worse  No N/T in LE  Review of Systems   Constitutional: Negative  Eyes: Negative  Respiratory: Negative  Cardiovascular: Negative  Gastrointestinal: Negative  Genitourinary: Negative  Musculoskeletal:        As in HPI   Skin:        As in HPI   Neurological: Negative  The following portions of the patient's history were reviewed and updated as appropriate: allergies, current medications, past family history, past medical history, past social history, past surgical history and problem list       Objective:     Physical Exam  Vitals reviewed  Constitutional:       General: She is not in acute distress  Appearance: Normal appearance  She is not ill-appearing or toxic-appearing  HENT:      Head: Normocephalic and atraumatic  Mouth/Throat:      Comments: Oral oral swelling, airway patent  Cardiovascular:      Rate and Rhythm: Normal rate and regular rhythm  Pulses: Normal pulses  Heart sounds: Normal heart sounds  Pulmonary:      Effort: Pulmonary effort is normal       Breath sounds: Normal breath sounds  Abdominal:      Tenderness: There is no abdominal tenderness  Musculoskeletal:      Lumbar back: Tenderness (left lower paraspinals) present  No swelling, deformity or bony tenderness  Normal range of motion (though left low back discomfort with movement)  Negative right straight leg raise test and negative left straight leg raise test       Left hip: No tenderness, bony tenderness or crepitus  Normal range of motion  Normal strength  Skin:     Comments: Small circular dark scabbed area right mid ventral forearm from bee sting  Macular erythematous rash localized around the sting site, extending about 2inches out, localized  No other rash noted  No swelling or warmth   Neurological:      Mental Status: She is oriented to person, place, and time     Psychiatric:         Mood and Affect: Mood normal          Behavior: Behavior normal          Vitals:    08/19/21 1103   BP: 98/61   BP Location: Right arm   Patient Position: Sitting   Cuff Size: Adult   Pulse: 71   Temp: 98 1 °F (36 7 °C)   TempSrc: Temporal   Weight: 64 9 kg (143 lb)   Height: 4' 11" (1 499 m)

## 2021-09-01 ENCOUNTER — APPOINTMENT (EMERGENCY)
Dept: RADIOLOGY | Facility: HOSPITAL | Age: 46
End: 2021-09-01
Payer: COMMERCIAL

## 2021-09-01 ENCOUNTER — HOSPITAL ENCOUNTER (EMERGENCY)
Facility: HOSPITAL | Age: 46
Discharge: HOME | End: 2021-09-01
Attending: EMERGENCY MEDICINE | Admitting: EMERGENCY MEDICINE
Payer: COMMERCIAL

## 2021-09-01 VITALS
BODY MASS INDEX: 26.21 KG/M2 | TEMPERATURE: 99 F | RESPIRATION RATE: 20 BRPM | HEART RATE: 90 BPM | SYSTOLIC BLOOD PRESSURE: 108 MMHG | WEIGHT: 130 LBS | OXYGEN SATURATION: 92 % | HEIGHT: 59 IN | DIASTOLIC BLOOD PRESSURE: 52 MMHG

## 2021-09-01 DIAGNOSIS — K80.20 CHOLELITHIASIS: ICD-10-CM

## 2021-09-01 DIAGNOSIS — A41.9 SEVERE SEPSIS (HCC): Primary | ICD-10-CM

## 2021-09-01 DIAGNOSIS — R11.2 NAUSEA AND VOMITING: ICD-10-CM

## 2021-09-01 DIAGNOSIS — K52.9 ENTERITIS: ICD-10-CM

## 2021-09-01 DIAGNOSIS — R65.20 SEVERE SEPSIS (HCC): Primary | ICD-10-CM

## 2021-09-01 DIAGNOSIS — R10.9 ABDOMINAL PAIN: ICD-10-CM

## 2021-09-01 LAB
ALBUMIN SERPL BCP-MCNC: 4.1 G/DL (ref 3.5–5)
ALP SERPL-CCNC: 89 U/L (ref 46–116)
ALT SERPL W P-5'-P-CCNC: 26 U/L (ref 12–78)
ANION GAP SERPL CALCULATED.3IONS-SCNC: 7 MMOL/L (ref 4–13)
AST SERPL W P-5'-P-CCNC: 10 U/L (ref 5–45)
BACTERIA UR QL AUTO: ABNORMAL /HPF
BASOPHILS # BLD AUTO: 0.02 THOUSANDS/ΜL (ref 0–0.1)
BASOPHILS NFR BLD AUTO: 0 % (ref 0–1)
BILIRUB SERPL-MCNC: 0.73 MG/DL (ref 0.2–1)
BILIRUB UR QL STRIP: NEGATIVE
BUN SERPL-MCNC: 20 MG/DL (ref 5–25)
CALCIUM SERPL-MCNC: 9.7 MG/DL (ref 8.3–10.1)
CHLORIDE SERPL-SCNC: 103 MMOL/L (ref 100–108)
CLARITY UR: ABNORMAL
CO2 SERPL-SCNC: 26 MMOL/L (ref 21–32)
COLOR UR: YELLOW
CREAT SERPL-MCNC: 0.69 MG/DL (ref 0.6–1.3)
EOSINOPHIL # BLD AUTO: 0.02 THOUSAND/ΜL (ref 0–0.61)
EOSINOPHIL NFR BLD AUTO: 0 % (ref 0–6)
ERYTHROCYTE [DISTWIDTH] IN BLOOD BY AUTOMATED COUNT: 12.3 % (ref 11.6–15.1)
GFR SERPL CREATININE-BSD FRML MDRD: 105 ML/MIN/1.73SQ M
GLUCOSE SERPL-MCNC: 108 MG/DL (ref 65–140)
GLUCOSE UR STRIP-MCNC: NEGATIVE MG/DL
HCT VFR BLD AUTO: 45.7 % (ref 34.8–46.1)
HGB BLD-MCNC: 15.1 G/DL (ref 11.5–15.4)
HGB UR QL STRIP.AUTO: ABNORMAL
HYALINE CASTS #/AREA URNS LPF: ABNORMAL /LPF
IMM GRANULOCYTES # BLD AUTO: 0.07 THOUSAND/UL (ref 0–0.2)
IMM GRANULOCYTES NFR BLD AUTO: 1 % (ref 0–2)
KETONES UR STRIP-MCNC: NEGATIVE MG/DL
LACTATE SERPL-SCNC: 1.6 MMOL/L (ref 0.5–2)
LEUKOCYTE ESTERASE UR QL STRIP: NEGATIVE
LIPASE SERPL-CCNC: 76 U/L (ref 73–393)
LYMPHOCYTES # BLD AUTO: 1.06 THOUSANDS/ΜL (ref 0.6–4.47)
LYMPHOCYTES NFR BLD AUTO: 7 % (ref 14–44)
MCH RBC QN AUTO: 30.8 PG (ref 26.8–34.3)
MCHC RBC AUTO-ENTMCNC: 33 G/DL (ref 31.4–37.4)
MCV RBC AUTO: 93 FL (ref 82–98)
MONOCYTES # BLD AUTO: 0.88 THOUSAND/ΜL (ref 0.17–1.22)
MONOCYTES NFR BLD AUTO: 6 % (ref 4–12)
NEUTROPHILS # BLD AUTO: 12.72 THOUSANDS/ΜL (ref 1.85–7.62)
NEUTS SEG NFR BLD AUTO: 86 % (ref 43–75)
NITRITE UR QL STRIP: NEGATIVE
NON-SQ EPI CELLS URNS QL MICRO: ABNORMAL /HPF
NRBC BLD AUTO-RTO: 0 /100 WBCS
PH UR STRIP.AUTO: 5 [PH]
PLATELET # BLD AUTO: 289 THOUSANDS/UL (ref 149–390)
PMV BLD AUTO: 10.8 FL (ref 8.9–12.7)
POTASSIUM SERPL-SCNC: 3.6 MMOL/L (ref 3.5–5.3)
PROCALCITONIN SERPL-MCNC: <0.05 NG/ML
PROT SERPL-MCNC: 8.4 G/DL (ref 6.4–8.2)
PROT UR STRIP-MCNC: NEGATIVE MG/DL
RBC # BLD AUTO: 4.9 MILLION/UL (ref 3.81–5.12)
RBC #/AREA URNS AUTO: ABNORMAL /HPF
SARS-COV-2 RNA RESP QL NAA+PROBE: NEGATIVE
SODIUM SERPL-SCNC: 136 MMOL/L (ref 136–145)
SP GR UR STRIP.AUTO: 1.02 (ref 1–1.03)
TROPONIN I SERPL-MCNC: <0.02 NG/ML
UROBILINOGEN UR QL STRIP.AUTO: 0.2 E.U./DL
WBC # BLD AUTO: 14.77 THOUSAND/UL (ref 4.31–10.16)
WBC #/AREA URNS AUTO: ABNORMAL /HPF

## 2021-09-01 PROCEDURE — 36415 COLL VENOUS BLD VENIPUNCTURE: CPT | Performed by: EMERGENCY MEDICINE

## 2021-09-01 PROCEDURE — 87040 BLOOD CULTURE FOR BACTERIA: CPT | Performed by: EMERGENCY MEDICINE

## 2021-09-01 PROCEDURE — 93005 ELECTROCARDIOGRAM TRACING: CPT

## 2021-09-01 PROCEDURE — 84145 PROCALCITONIN (PCT): CPT | Performed by: EMERGENCY MEDICINE

## 2021-09-01 PROCEDURE — 96367 TX/PROPH/DG ADDL SEQ IV INF: CPT

## 2021-09-01 PROCEDURE — 83605 ASSAY OF LACTIC ACID: CPT | Performed by: EMERGENCY MEDICINE

## 2021-09-01 PROCEDURE — 96366 THER/PROPH/DIAG IV INF ADDON: CPT

## 2021-09-01 PROCEDURE — 96375 TX/PRO/DX INJ NEW DRUG ADDON: CPT

## 2021-09-01 PROCEDURE — 80053 COMPREHEN METABOLIC PANEL: CPT | Performed by: EMERGENCY MEDICINE

## 2021-09-01 PROCEDURE — 71045 X-RAY EXAM CHEST 1 VIEW: CPT

## 2021-09-01 PROCEDURE — 96361 HYDRATE IV INFUSION ADD-ON: CPT

## 2021-09-01 PROCEDURE — U0005 INFEC AGEN DETEC AMPLI PROBE: HCPCS | Performed by: EMERGENCY MEDICINE

## 2021-09-01 PROCEDURE — 76705 ECHO EXAM OF ABDOMEN: CPT

## 2021-09-01 PROCEDURE — 99244 OFF/OP CNSLTJ NEW/EST MOD 40: CPT | Performed by: SURGERY

## 2021-09-01 PROCEDURE — 81001 URINALYSIS AUTO W/SCOPE: CPT | Performed by: EMERGENCY MEDICINE

## 2021-09-01 PROCEDURE — 99291 CRITICAL CARE FIRST HOUR: CPT | Performed by: EMERGENCY MEDICINE

## 2021-09-01 PROCEDURE — 96365 THER/PROPH/DIAG IV INF INIT: CPT

## 2021-09-01 PROCEDURE — 84484 ASSAY OF TROPONIN QUANT: CPT | Performed by: EMERGENCY MEDICINE

## 2021-09-01 PROCEDURE — 99284 EMERGENCY DEPT VISIT MOD MDM: CPT

## 2021-09-01 PROCEDURE — 74177 CT ABD & PELVIS W/CONTRAST: CPT

## 2021-09-01 PROCEDURE — U0003 INFECTIOUS AGENT DETECTION BY NUCLEIC ACID (DNA OR RNA); SEVERE ACUTE RESPIRATORY SYNDROME CORONAVIRUS 2 (SARS-COV-2) (CORONAVIRUS DISEASE [COVID-19]), AMPLIFIED PROBE TECHNIQUE, MAKING USE OF HIGH THROUGHPUT TECHNOLOGIES AS DESCRIBED BY CMS-2020-01-R: HCPCS | Performed by: EMERGENCY MEDICINE

## 2021-09-01 PROCEDURE — 83690 ASSAY OF LIPASE: CPT | Performed by: EMERGENCY MEDICINE

## 2021-09-01 PROCEDURE — 85025 COMPLETE CBC W/AUTO DIFF WBC: CPT | Performed by: EMERGENCY MEDICINE

## 2021-09-01 RX ORDER — ONDANSETRON 4 MG/1
4 TABLET, ORALLY DISINTEGRATING ORAL EVERY 6 HOURS PRN
Qty: 20 TABLET | Refills: 0 | Status: SHIPPED | OUTPATIENT
Start: 2021-09-01 | End: 2021-09-01 | Stop reason: SDUPTHER

## 2021-09-01 RX ORDER — ACETAMINOPHEN 325 MG/1
975 TABLET ORAL ONCE
Status: COMPLETED | OUTPATIENT
Start: 2021-09-01 | End: 2021-09-01

## 2021-09-01 RX ORDER — HYDROMORPHONE HCL/PF 1 MG/ML
1 SYRINGE (ML) INJECTION ONCE
Status: COMPLETED | OUTPATIENT
Start: 2021-09-01 | End: 2021-09-01

## 2021-09-01 RX ORDER — ONDANSETRON 4 MG/1
4 TABLET, ORALLY DISINTEGRATING ORAL EVERY 6 HOURS PRN
Qty: 20 TABLET | Refills: 0 | Status: SHIPPED | OUTPATIENT
Start: 2021-09-01 | End: 2022-05-13 | Stop reason: ALTCHOICE

## 2021-09-01 RX ORDER — ONDANSETRON 2 MG/ML
4 INJECTION INTRAMUSCULAR; INTRAVENOUS ONCE
Status: COMPLETED | OUTPATIENT
Start: 2021-09-01 | End: 2021-09-01

## 2021-09-01 RX ORDER — KETOROLAC TROMETHAMINE 30 MG/ML
15 INJECTION, SOLUTION INTRAMUSCULAR; INTRAVENOUS ONCE
Status: COMPLETED | OUTPATIENT
Start: 2021-09-01 | End: 2021-09-01

## 2021-09-01 RX ADMIN — SODIUM CHLORIDE 1000 ML: 0.9 INJECTION, SOLUTION INTRAVENOUS at 16:48

## 2021-09-01 RX ADMIN — CEFTRIAXONE 1000 MG: 1 INJECTION, POWDER, FOR SOLUTION INTRAMUSCULAR; INTRAVENOUS at 14:05

## 2021-09-01 RX ADMIN — HYDROMORPHONE HYDROCHLORIDE 1 MG: 1 INJECTION, SOLUTION INTRAMUSCULAR; INTRAVENOUS; SUBCUTANEOUS at 13:35

## 2021-09-01 RX ADMIN — SODIUM CHLORIDE 1000 ML: 0.9 INJECTION, SOLUTION INTRAVENOUS at 15:38

## 2021-09-01 RX ADMIN — IOHEXOL 100 ML: 350 INJECTION, SOLUTION INTRAVENOUS at 15:56

## 2021-09-01 RX ADMIN — SODIUM CHLORIDE 1000 ML: 0.9 INJECTION, SOLUTION INTRAVENOUS at 13:34

## 2021-09-01 RX ADMIN — ACETAMINOPHEN 975 MG: 325 TABLET, FILM COATED ORAL at 14:39

## 2021-09-01 RX ADMIN — ONDANSETRON 4 MG: 2 INJECTION INTRAMUSCULAR; INTRAVENOUS at 13:36

## 2021-09-01 RX ADMIN — VANCOMYCIN HYDROCHLORIDE 1250 MG: 10 INJECTION, POWDER, LYOPHILIZED, FOR SOLUTION INTRAVENOUS at 14:41

## 2021-09-01 RX ADMIN — KETOROLAC TROMETHAMINE 15 MG: 30 INJECTION, SOLUTION INTRAMUSCULAR; INTRAVENOUS at 17:21

## 2021-09-01 NOTE — SEPSIS NOTE
Sepsis Note   Johanne Castillo 40 y o  female MRN: [id number]  Unit/Bed#: ED 08 Encounter: [id number]      qSOFA     Row Name 09/01/21 1630 09/01/21 1600 09/01/21 1545 09/01/21 1500 09/01/21 1440    Altered mental status GCS < 15  --  --  --  --  --    Respiratory Rate > / =22  1  0  0  0  0    Systolic BP < / =044  1  0  0  1  0    Q Sofa Score  2  0  0  1  0    Row Name 09/01/21 1414 09/01/21 1330 09/01/21 1312          Altered mental status GCS < 15  0  --  --      Respiratory Rate > / =22  --  0  0      Systolic BP < / =332  --  0  0      Q Sofa Score  0  0  0          Initial Sepsis Screening     Row Name 09/01/21 1646                Is the patient's history suggestive of a new or worsening infection? (!) Yes (Proceed)  -KB        Suspected source of infection  acute abdominal infection  -KB        Are two or more of the following signs & symptoms of infection both present and new to the patient? (!) Yes (Proceed)  -KB        Indicate SIRS criteria  Tachycardia > 90 bpm;Leukocytosis (WBC > 88795 IJL)  -KB        If the answer is yes to both questions, suspicion of sepsis is present  --        If severe sepsis is present AND tissue hypoperfusion perists in the hour after fluid resuscitation or lactate > 4, the patient meets criteria for SEPTIC SHOCK  --        Are any of the following organ dysfunction criteria present within 6 hours of suspected infection and SIRS criteria that are NOT considered to be chronic conditions? --        Organ dysfunction  MAP < 65 mmHg  -KB        Date of presentation of severe sepsis  09/01/21  -KB        Time of presentation of severe sepsis  1647  -KB        Tissue hypoperfusion persists in the hour after crystalloid fluid administration, evidenced, by either:  --        Was hypotension present within one hour of the conclusion of crystalloid fluid administration?   No  -KB        Date of presentation of septic shock  --        Time of presentation of septic shock  -- User Key  (r) = Recorded By, (t) = Taken By, (c) = Cosigned By    [address] Name Provider Rafael Pascual MD Physician

## 2021-09-01 NOTE — Clinical Note
Agnes Lewis was seen and treated in our emergency department on 9/1/2021  Diagnosis:     Bella Larios  may return to work on return date  She may return on this date: 09/03/2021         If you have any questions or concerns, please don't hesitate to call        Jarrett Meadows MD    ______________________________           _______________          _______________  Hospital Representative                              Date                                Time

## 2021-09-01 NOTE — ED RE-EVALUATION NOTE
Re-evaluated patient  At this point, she states that her abdominal pain has completely resolved  She states that overall she feels better and would like to go home  Surgery evaluated patient and they have no concerns for cholecystitis  Patient offered admission but states that she feels as if she is able to go home  Repeat vital signs show improvement in heart rate as well as blood pressure  Patient given food and although she states that she did not have much of an appetite, she was able to tolerate both liquids and solids  Will discharge with Zofran and instructions to return if symptoms worsen or recur         Catarina Monet MD  09/01/21 4152

## 2021-09-01 NOTE — SEPSIS NOTE
Sepsis Note   Thao Motley 40 y o  female MRN: [id number]  Unit/Bed#: ED 08 Encounter: [id number]      qSOFA     Row Name 09/01/21 1830 09/01/21 1800 09/01/21 1700 09/01/21 1645 09/01/21 1630    Altered mental status GCS < 15  --  --  --  --  --    Respiratory Rate > / =22  0  0  1  0  1    Systolic BP < / =331  0  1  1  1  1    Q Sofa Score  0  1  2  1  2    Row Name 09/01/21 1600 09/01/21 1545 09/01/21 1500 09/01/21 1440 09/01/21 1414    Altered mental status GCS < 15  --  --  --  --  0    Respiratory Rate > / =22  0  0  0  0  --    Systolic BP < / =611  0  0  1  0  --    Q Sofa Score  0  0  1  0  0    Row Name 09/01/21 1330 09/01/21 1312             Altered mental status GCS < 15  --  --       Respiratory Rate > / =22  0  0       Systolic BP < / =261  0  0       Q Sofa Score  0  0           Initial Sepsis Screening     Row Name 09/01/21 1646                Is the patient's history suggestive of a new or worsening infection? (!) Yes (Proceed)  -KB        Suspected source of infection  acute abdominal infection  -KB        Are two or more of the following signs & symptoms of infection both present and new to the patient? (!) Yes (Proceed)  -KB        Indicate SIRS criteria  Tachycardia > 90 bpm;Leukocytosis (WBC > 43715 IJL)  -KB        If the answer is yes to both questions, suspicion of sepsis is present  --        If severe sepsis is present AND tissue hypoperfusion perists in the hour after fluid resuscitation or lactate > 4, the patient meets criteria for SEPTIC SHOCK  --        Are any of the following organ dysfunction criteria present within 6 hours of suspected infection and SIRS criteria that are NOT considered to be chronic conditions?   --        Organ dysfunction  MAP < 65 mmHg  -KB        Date of presentation of severe sepsis  09/01/21  -KB        Time of presentation of severe sepsis  1647  -KB        Tissue hypoperfusion persists in the hour after crystalloid fluid administration, evidenced, by either:  --        Was hypotension present within one hour of the conclusion of crystalloid fluid administration? No  -KB        Date of presentation of septic shock  --        Time of presentation of septic shock  --          User Key  (r) = Recorded By, (t) = Taken By, (c) = Cosigned By    [address] Name Provider Type    Jose Martínez MD Physician               Default Flowsheet Data (last 720 hours)      Sepsis Reassess     Row Name 09/01/21 1844 09/01/21 1832                Repeat Volume Status and Tissue Perfusion Assessment Performed    Repeat Volume Status and Tissue Perfusion Assessment Performed  Yes  -AL  Yes  -AL          Volume Status and Tissue Perfusion Post Fluid Resuscitation * Must Document All *    Vital Signs Reviewed (HR, RR, BP, T)  Yes  -AL  Yes  -AL       Shock Index Reviewed  Yes  -AL  Yes  -AL       Arterial Oxygen Saturation Reviewed (POx, SaO2 or SpO2)  Yes (comment %)  -AL  Yes (comment %) 96  -AL       Cardio  Regular rate and rhythm;Normal S1/S2  -AL  --       Pulmonary  Normal effort;Clear to auscultation  -AL  --       Capillary Refill  Brisk  -AL  --       Peripheral Pulses  Radial;Dorsalis Pedis; Posterior Tibialis  -AL  --       Peripheral Pulse  +2  -AL  --       Dorsalis Pedis  +2  -AL  --       Posterior Tibialis  +2  -AL  --       Skin  Dry; Warm  -AL  --       Urine output assessed  Adequate  -AL  --          *OR*   Intensive Monitoring- Must Document One of the Following Four *:    Vital Signs Reviewed  --  --       * Central Venous Pressure (CVP or RAP)  --  --       * Central Venous Oxygen (SVO2, ScvO2 or Oxygen saturation via central catheter)  --  --       * Bedside Cardiovascular US in IVC diameter and % collapse  --  --       * Passive Leg Raise OR Crystalloid Challenge  --  --         User Key  (r) = Recorded By, (t) = Taken By, (c) = Cosigned By    Initials Name Provider Type    AL Leticia Phillips MD Physician

## 2021-09-01 NOTE — ED PROVIDER NOTES
History  Chief Complaint   Patient presents with    Vomiting     HPI  16-year-old woman presenting with chief complaint of abdominal pain  Patient states that she had abrupt onset of epigastric pain this morning after breakfast, the pain is described as crampy and severe, intermittent with radiation to the right upper quadrant and back  Nothing in particular brings it on or makes it better or worse  This has been associated with nausea, vomiting and diarrhea  No fever but patient has had chills  No pain or burning with urination  No vaginal bleeding or discharge  No chest pain or shortness of breath  Patient does not have any history of abdominal surgeries  Patient additionally endorses some recent congestion and headache  Prior to Admission Medications   Prescriptions Last Dose Informant Patient Reported? Taking?    EPINEPHrine (EPIPEN) 0 3 mg/0 3 mL SOAJ   No No   Sig: Inject 0 3 mL (0 3 mg total) into a muscle once as needed for anaphylaxis for up to 1 dose   aspirin-acetaminophen-caffeine (EXCEDRIN MIGRAINE) 250-250-65 MG per tablet   Yes No   Sig: Take 1 tablet by mouth every 6 (six) hours as needed for headaches   diphenhydrAMINE (BENADRYL) 50 mg capsule   No No   Sig: Take 1 capsule (50 mg total) by mouth every 6 (six) hours as needed for itching or allergies   famotidine (PEPCID) 20 mg tablet   No No   Sig: Take 1 tablet (20 mg total) by mouth daily as needed (Allergic reaction)   Patient not taking: Reported on 2021   fluticasone (FLONASE) 50 mcg/act nasal spray   No No   Si sprays into each nostril daily   Patient not taking: Reported on 2021   levonorgestrel (MIRENA) 20 MCG/24HR IUD   Yes No   Si each by Intrauterine route once   loratadine (CLARITIN) 10 mg tablet   No No   Sig: Take 1 tablet (10 mg total) by mouth daily   Patient not taking: Reported on 2021   medroxyPROGESTERone (DEPO-PROVERA) 150 mg/mL injection   No No   Sig: Inject 1 mL (150 mg total) into a muscle every 3 (three) months   Patient not taking: Reported on 8/13/2021   omeprazole (PriLOSEC) 20 mg delayed release capsule   No No   Sig: Take 1 capsule (20 mg total) by mouth daily   triamcinolone (KENALOG) 0 1 % ointment   No No   Sig: Apply topically 2 (two) times a day To right forearm rash      Facility-Administered Medications: None       Past Medical History:   Diagnosis Date    GERD (gastroesophageal reflux disease)     Migraine     takes excederine migraine       Past Surgical History:   Procedure Laterality Date    DENTAL SURGERY         Family History   Problem Relation Age of Onset    Breast cancer Mother         60 y/o    [facility] BRCA1 Negative Mother     Diabetes Father     Hypertension Father     No Known Problems Brother     No Known Problems Daughter     No Known Problems Son     Breast cancer Maternal Grandmother 46    Lung cancer Maternal Grandmother 59    Alzheimer's disease Maternal Grandfather     Heart disease Paternal Grandmother     No Known Problems Paternal Grandfather     No Known Problems Brother     No Known Problems Brother     No Known Problems Son     Lung cancer Maternal Uncle 48    Throat cancer Cousin 39    BRCA1 Positive Cousin     Kidney cancer Cousin 39     I have reviewed and agree with the history as documented  E-Cigarette/Vaping    E-Cigarette Use Never User      E-Cigarette/Vaping Substances    Nicotine No     THC No     CBD No     Flavoring No     Other No     Unknown No      Social History     Tobacco Use    Smoking status: Never Smoker    Smokeless tobacco: Never Used   Vaping Use    Vaping Use: Never used   Substance Use Topics    Alcohol use: Yes     Comment: social    Drug use: No       Review of Systems   Constitutional: Positive for chills  Negative for fever  HENT: Positive for congestion  Negative for sore throat  Eyes: Negative  Respiratory: Negative for cough and shortness of breath      Cardiovascular: Negative for chest pain and leg swelling  Gastrointestinal: Positive for abdominal pain, diarrhea, nausea and vomiting  Genitourinary: Negative for dysuria and hematuria  Musculoskeletal: Negative  Skin: Negative  Allergic/Immunologic: Negative  Neurological: Positive for headaches  Negative for dizziness  Hematological: Negative  Physical Exam  Physical Exam  Vitals reviewed  Constitutional:       General: She is in acute distress  Appearance: Normal appearance  HENT:      Head: Normocephalic and atraumatic  Mouth/Throat:      Mouth: Mucous membranes are moist       Pharynx: Oropharynx is clear  Eyes:      Conjunctiva/sclera: Conjunctivae normal       Pupils: Pupils are equal, round, and reactive to light  Cardiovascular:      Rate and Rhythm: Regular rhythm  Tachycardia present  Heart sounds: No murmur heard  No friction rub  No gallop  Pulmonary:      Effort: Pulmonary effort is normal  No respiratory distress  Breath sounds: Normal breath sounds  Abdominal:      General: There is no distension  Palpations: Abdomen is soft  Tenderness: There is no right CVA tenderness or left CVA tenderness  Comments: There is significant epigastric and right upper quadrant tenderness  No rebound or guarding  Musculoskeletal:         General: No swelling  Cervical back: Neck supple  Right lower leg: No edema  Left lower leg: No edema  Skin:     General: Skin is warm and dry  Neurological:      General: No focal deficit present  Mental Status: She is alert and oriented to person, place, and time           Vital Signs  ED Triage Vitals   Temperature Pulse Respirations Blood Pressure SpO2   09/01/21 1319 09/01/21 1312 09/01/21 1312 09/01/21 1312 09/01/21 1312   99 °F (37 2 °C) (!) 121 20 112/71 97 %      Temp src Heart Rate Source Patient Position - Orthostatic VS BP Location FiO2 (%)   -- 09/01/21 1312 09/01/21 1312 09/01/21 1312 --    Monitor Sitting Right arm       Pain Score       09/01/21 1312       5           Vitals:    09/01/21 1545 09/01/21 1600 09/01/21 1630 09/01/21 1645   BP: 104/50 101/64 91/53 95/51   Pulse: 100 (!) 106 (!) 108 (!) 112   Patient Position - Orthostatic VS: Sitting Sitting Lying Lying         Visual Acuity      ED Medications  Medications   sodium chloride 0 9 % bolus 1,000 mL (1,000 mL Intravenous New Bag 9/1/21 1648)   sodium chloride 0 9 % bolus 1,000 mL (0 mL Intravenous Stopped 9/1/21 1439)   HYDROmorphone (DILAUDID) injection 1 mg (1 mg Intravenous Given 9/1/21 1335)   ondansetron (ZOFRAN) injection 4 mg (4 mg Intravenous Given 9/1/21 1336)   ceftriaxone (ROCEPHIN) 1 g/50 mL in dextrose IVPB (0 mg Intravenous Stopped 9/1/21 1439)   vancomycin (VANCOCIN) 1,250 mg in sodium chloride 0 9 % 250 mL IVPB (0 mg/kg × 59 kg Intravenous Stopped 9/1/21 1629)   acetaminophen (TYLENOL) tablet 975 mg (975 mg Oral Given 9/1/21 1439)   sodium chloride 0 9 % bolus 1,000 mL (0 mL Intravenous Stopped 9/1/21 1643)   iohexol (OMNIPAQUE) 350 MG/ML injection (MULTI-DOSE) 100 mL (100 mL Intravenous Given 9/1/21 1556)       Diagnostic Studies  Results Reviewed     Procedure Component Value Units Date/Time    Procalcitonin with AM Reflex [[id number]]  (Normal) Collected: 09/01/21 1404    Lab Status: Final result Specimen: Blood from Arm, Right Updated: 09/01/21 1621     Procalcitonin <0 05 ng/ml     Procalcitonin Reflex [[id number]]     Lab Status: No result Specimen: Blood     Urine Microscopic [[id number]]  (Abnormal) Collected: 09/01/21 1541    Lab Status: Final result Specimen: Urine, Clean Catch Updated: 09/01/21 1558     RBC, UA 2-4 /hpf      WBC, UA 4-10 /hpf      Epithelial Cells Occasional /hpf      Bacteria, UA Occasional /hpf      Hyaline Casts, UA 5-10 /lpf     UA w Reflex to Microscopic w Reflex to Culture [[id number]]  (Abnormal) Collected: 09/01/21 1546    Lab Status: Final result Specimen: Urine, Clean Catch Updated: 09/01/21 1556     Color, UA Yellow     Clarity, UA Cloudy     Specific Gravity, UA 1 024     pH, UA 5 0     Leukocytes, UA Negative     Nitrite, UA Negative     Protein, UA Negative mg/dl      Glucose, UA Negative mg/dl      Ketones, UA Negative mg/dl      Urobilinogen, UA 0 2 E U /dl      Bilirubin, UA Negative     Blood, UA Trace    Novel Coronavirus (Covid-19),PCR [facility] [[id number]]  (Normal) Collected: 09/01/21 1409    Lab Status: Final result Specimen: Nares from Nasopharyngeal Swab Updated: 09/01/21 1531     SARS-CoV-2 Negative    Narrative:           Lactic acid, plasma [[id number]]  (Normal) Collected: 09/01/21 1404    Lab Status: Final result Specimen: Blood from Arm, Right Updated: 09/01/21 1447     LACTIC ACID 1 6 mmol/L     Narrative:      Result may be elevated if tourniquet was used during collection  Blood culture #1 [[id number]] Collected: 09/01/21 1334    Lab Status: In process Specimen: Blood from Arm, Right Updated: 09/01/21 1414    Blood culture #2 [[id number]] Collected: 09/01/21 1404    Lab Status:  In process Specimen: Blood from Arm, Left Updated: 09/01/21 1414    Troponin I [[id number]]  (Normal) Collected: 09/01/21 1334    Lab Status: Final result Specimen: Blood from Arm, Right Updated: 09/01/21 1408     Troponin I <0 02 ng/mL     Comprehensive metabolic panel [[id number]]  (Abnormal) Collected: 09/01/21 1334    Lab Status: Final result Specimen: Blood from Arm, Right Updated: 09/01/21 1406     Sodium 136 mmol/L      Potassium 3 6 mmol/L      Chloride 103 mmol/L      CO2 26 mmol/L      ANION GAP 7 mmol/L      BUN 20 mg/dL      Creatinine 0 69 mg/dL      Glucose 108 mg/dL      Calcium 9 7 mg/dL      AST 10 U/L      ALT 26 U/L      Alkaline Phosphatase 89 U/L      Total Protein 8 4 g/dL      Albumin 4 1 g/dL      Total Bilirubin 0 73 mg/dL      eGFR 105 ml/min/1 73sq m     Narrative:      [place] guidelines for Chronic Kidney Disease (CKD):     Stage 1 with normal or high GFR (GFR > 90 mL/min/1 73 square meters)    Stage 2 Mild CKD (GFR = 60-89 mL/min/1 73 square meters)    Stage 3A Moderate CKD (GFR = 45-59 mL/min/1 73 square meters)    Stage 3B Moderate CKD (GFR = 30-44 mL/min/1 73 square meters)    Stage 4 Severe CKD (GFR = 15-29 mL/min/1 73 square meters)    Stage 5 End Stage CKD (GFR <15 mL/min/1 73 square meters)  Note: GFR calculation is accurate only with a steady state creatinine    Lipase [[id number]]  (Normal) Collected: 09/01/21 1334    Lab Status: Final result Specimen: Blood from Arm, Right Updated: 09/01/21 1406     Lipase 76 u/L     CBC and differential [[id number]]  (Abnormal) Collected: 09/01/21 1334    Lab Status: Final result Specimen: Blood from Arm, Right Updated: 09/01/21 1346     WBC 14 77 Thousand/uL      RBC 4 90 Million/uL      Hemoglobin 15 1 g/dL      Hematocrit 45 7 %      MCV 93 fL      MCH 30 8 pg      MCHC 33 0 g/dL      RDW 12 3 %      MPV 10 8 fL      Platelets 450 Thousands/uL      nRBC 0 /100 WBCs      Neutrophils Relative 86 %      Immat GRANS % 1 %      Lymphocytes Relative 7 %      Monocytes Relative 6 %      Eosinophils Relative 0 %      Basophils Relative 0 %      Neutrophils Absolute 12 72 Thousands/µL      Immature Grans Absolute 0 07 Thousand/uL      Lymphocytes Absolute 1 06 Thousands/µL      Monocytes Absolute 0 88 Thousand/µL      Eosinophils Absolute 0 02 Thousand/µL      Basophils Absolute 0 02 Thousands/µL                  US right upper quadrant   Final Result by Kvng Ross MD (09/01 1705)      Cholelithiasis, with no evidence of acute cholecystitis  Normal caliber common bile duct  Workstation performed: [id number]         CT abdomen pelvis with contrast   Final Result by Ximena Granado DO (09/01 1623)      Suggestion of mild wall thickening small bowel loops right mid to lower abdomen which could be indicative of infectious or inflammatory enteritis  No evidence of bowel obstruction or inflammatory changes in the mesentery  Uncomplicated cholelithiasis  Workstation performed: [id number]         XR chest 1 view portable   Final Result by Benjamin Norton MD (09/01 4129)      No acute cardiopulmonary disease  Workstation performed: [id number]                    Procedures  CriticalCare Time  Performed by: Desire Burns MD  Authorized by: Desire Burns MD     Critical care provider statement:     Critical care time (minutes):  33    Critical care time was exclusive of:  Separately billable procedures and treating other patients and teaching time    Critical care was necessary to treat or prevent imminent or life-threatening deterioration of the following conditions:  Sepsis    Critical care was time spent personally by me on the following activities:  Obtaining history from patient or surrogate, development of treatment plan with patient or surrogate, discussions with consultants, evaluation of patient's response to treatment, examination of patient, interpretation of cardiac output measurements, ordering and performing treatments and interventions, ordering and review of laboratory studies, ordering and review of radiographic studies and re-evaluation of patient's condition    I assumed direction of critical care for this patient from another provider in my specialty: no               ED Course                         Initial Sepsis Screening     [address] Name 09/01/21 1646                Is the patient's history suggestive of a new or worsening infection? (!) Yes (Proceed)  -KB        Suspected source of infection  acute abdominal infection  -KB        Are two or more of the following signs & symptoms of infection both present and new to the patient?   (!) Yes (Proceed)  -KB        Indicate SIRS criteria  Tachycardia > 90 bpm;Leukocytosis (WBC > 05916 IJL)  -KB        If the answer is yes to both questions, suspicion of sepsis is present  --        If severe sepsis is present AND tissue hypoperfusion perists in the hour after fluid resuscitation or lactate > 4, the patient meets criteria for SEPTIC SHOCK  --        Are any of the following organ dysfunction criteria present within 6 hours of suspected infection and SIRS criteria that are NOT considered to be chronic conditions? --        Organ dysfunction  MAP < 65 mmHg  -KB        Date of presentation of severe sepsis  09/01/21  -KB        Time of presentation of severe sepsis  1647  -KB        Tissue hypoperfusion persists in the hour after crystalloid fluid administration, evidenced, by either:  --        Was hypotension present within one hour of the conclusion of crystalloid fluid administration? No  -KB        Date of presentation of septic shock  --        Time of presentation of septic shock  --          User Key  (r) = Recorded By, (t) = Taken By, (c) = Cosigned By    [address] Name Provider Type    Tristin Bailey MD Physician                        MDM  Number of Diagnoses or Management Options  Abdominal pain  Diagnosis management comments: 55-year-old woman presenting with acute onset upper abdominal pain along with nausea and vomiting  Was tachycardic on presentation to the emergency department, has remained tachycardic in spite of IV fluids and now developing hypotension  Did have significant abdominal tenderness on exam   Has been receiving IV fluids and we will continue to monitor closely, if blood pressure continues to decline in spite of IV fluids may have to initiate vasopressors  White blood cell count 40319  Initial CT scan does show cholelithiasis and also possible findings of enteritis  Given the elevated white blood cell count, cholelithiasis, abdominal tenderness, and criteria for severe sepsis, general surgery was consulted and they will evaluate the patient  Patient will ultimately require admission and continued antibiotics  Signing out to to [facility]         Amount and/or Complexity of Data Reviewed  Clinical lab tests: ordered and reviewed  Tests in the radiology section of CPT®: ordered and reviewed        Disposition  Final diagnoses:   Abdominal pain   Severe sepsis ([facility] )   Cholelithiasis   Nausea and vomiting     Time reflects when diagnosis was documented in both MDM as applicable and the Disposition within this note     Time User Action Codes Description Comment    9/1/2021  4:46 PM Rosetta Elma A Add [R10 9] Abdominal pain     9/1/2021  5:06 PM Rosetta Sesser A Add [A41 9,  R65 20] Severe sepsis ([facility] )     9/1/2021  5:06 PM Rosetta Sesser A Modify [R10 9] Abdominal pain     9/1/2021  5:06 PM Rosetta Elma A Modify [A41 9,  R65 20] Severe sepsis ([facility] )     9/1/2021  5:06 PM Rosetta Elma A Add [K80 20] Cholelithiasis     9/1/2021  5:06 PM Mariusz Hark Add [R11 2] Nausea and vomiting       ED Disposition     None      Follow-up Information    None         Patient's Medications   Discharge Prescriptions    No medications on file     No discharge procedures on file      PDMP Review     None          ED Provider  Electronically Signed by           Peter Benz MD  09/01/21 0552

## 2021-09-01 NOTE — ED NOTES
Pt aware urine specimen is aware, states she is unable to void at this time  Dr Saurav Bell states OK to start antibiotic prior to obtaining urine specimen        Clyde Lee RN  09/01/21 1474

## 2021-09-02 LAB
ATRIAL RATE: 113 BPM
P AXIS: 57 DEGREES
PR INTERVAL: 128 MS
QRS AXIS: 22 DEGREES
QRSD INTERVAL: 72 MS
QT INTERVAL: 320 MS
QTC INTERVAL: 438 MS
T WAVE AXIS: 27 DEGREES
VENTRICULAR RATE: 113 BPM

## 2021-09-02 PROCEDURE — 93010 ELECTROCARDIOGRAM REPORT: CPT | Performed by: INTERNAL MEDICINE

## 2021-09-02 NOTE — CONSULTS
Consultation - General Surgery  Cindy Mitchell 40 y o  female MRN: [id number]  Unit/Bed#: ED 08 Encounter: [id number]        Assessment/Plan     Assessment:  Cindy Mitchell is a 40 y o  female with a PMHx of GERD who presents to [facility] with acute complaints of epigastric abdominal pain, with CT scan revealing cholelithiasis without cholecystitis and small bowel thickening suggestive of enteritis  Tachycardic, WBC 14 77, T bili 0 73, LFTs 10/26/89    Plan:  · No acute surgical intervention   · Patient with cholelithiasis, no signs of acute cholecystitis (no GB wall thickening, no pericholecystic fluid, negative Saenz's sign)  · Patient's acute abdominal pain likely 2/2 enteritis in the setting of small bowel thickening  · Dispo per ED team      History of Present Illness     HPI:  Cindy Mitchell is a 40 y o  female with a past medical history of GERD, presents with 1 day of abdominal pain  Patient states she had a normal dinner of chicken and rice, and woke up this morning with abdominal pain  She states that she has never had this pain before  She describes it as a pressure in her upper abdomen  Denies relation to food  Denies constipation, diarrhea, hematochezia, hematuria  Patient states she also had 1 episode of emesis that was green in color  Denies hematemesis  Patient is tachycardic with a leukocytosis of 14 77  T bili 0 73  LFTs 10/26/89  Denies ALEXIS  Denies prior surgeries  CT chest the abdomen pelvis with contrast:  Suggestion of mild wall thickening small bowel loops right mid to lower abdomen which could be indicative of infectious or inflammatory enteritis  No evidence of bowel obstruction or inflammatory changes in the mesentery  Uncomplicated cholelithiasis  Ultrasound right upper quadrant:  Cholelithiasis, with no evidence of acute cholecystitis  Normal caliber common bile duct  Review of Systems   Constitutional: Negative for chills and fever     HENT: Negative for ear pain and sore throat  Eyes: Negative for pain  Respiratory: Negative for cough, chest tightness, shortness of breath and wheezing  Cardiovascular: Negative for chest pain and palpitations  Gastrointestinal: Positive for abdominal pain, nausea and vomiting  Negative for blood in stool, constipation and diarrhea  Genitourinary: Negative for dysuria, frequency and hematuria  Musculoskeletal: Negative for back pain and neck pain  Neurological: Positive for headaches  Negative for dizziness, seizures and weakness  Psychiatric/Behavioral: Negative for confusion and sleep disturbance  All other systems reviewed and are negative        Historical Information   Past Medical History:   Diagnosis Date    GERD (gastroesophageal reflux disease)     Migraine     takes excederine migraine     Past Surgical History:   Procedure Laterality Date    DENTAL SURGERY       Social History   Social History     Substance and Sexual Activity   Alcohol Use Yes    Comment: social     Social History     Substance and Sexual Activity   Drug Use No     Social History     Tobacco Use   Smoking Status Never Smoker   Smokeless Tobacco Never Used     Family History:   Family History   Problem Relation Age of Onset    Breast cancer Mother         60 y/o    Jase Morganabamaddy BRCA1 Negative Mother     Diabetes Father     Hypertension Father     No Known Problems Brother     No Known Problems Daughter     No Known Problems Son     Breast cancer Maternal Grandmother 46    Lung cancer Maternal Grandmother 59    Alzheimer's disease Maternal Grandfather     Heart disease Paternal Grandmother     No Known Problems Paternal Grandfather     No Known Problems Brother     No Known Problems Brother     No Known Problems Son     Lung cancer Maternal Uncle 48    Throat cancer Cousin 39    BRCA1 Positive Cousin     Kidney cancer Cousin 41       Meds/Allergies   PTA meds:   Prior to Admission Medications   Prescriptions Last Dose Informant Patient Reported? Taking? EPINEPHrine (EPIPEN) 0 3 mg/0 3 mL SOAJ   No No   Sig: Inject 0 3 mL (0 3 mg total) into a muscle once as needed for anaphylaxis for up to 1 dose   aspirin-acetaminophen-caffeine (EXCEDRIN MIGRAINE) 250-250-65 MG per tablet   Yes No   Sig: Take 1 tablet by mouth every 6 (six) hours as needed for headaches   diphenhydrAMINE (BENADRYL) 50 mg capsule   No No   Sig: Take 1 capsule (50 mg total) by mouth every 6 (six) hours as needed for itching or allergies   famotidine (PEPCID) 20 mg tablet   No No   Sig: Take 1 tablet (20 mg total) by mouth daily as needed (Allergic reaction)   Patient not taking: Reported on 2021   fluticasone (FLONASE) 50 mcg/act nasal spray   No No   Si sprays into each nostril daily   Patient not taking: Reported on 2021   levonorgestrel (MIRENA) 20 MCG/24HR IUD   Yes No   Si each by Intrauterine route once   loratadine (CLARITIN) 10 mg tablet   No No   Sig: Take 1 tablet (10 mg total) by mouth daily   Patient not taking: Reported on 2021   medroxyPROGESTERone (DEPO-PROVERA) 150 mg/mL injection   No No   Sig: Inject 1 mL (150 mg total) into a muscle every 3 (three) months   Patient not taking: Reported on 2021   omeprazole (PriLOSEC) 20 mg delayed release capsule   No No   Sig: Take 1 capsule (20 mg total) by mouth daily   triamcinolone (KENALOG) 0 1 % ointment   No No   Sig: Apply topically 2 (two) times a day To right forearm rash      Facility-Administered Medications: None     Allergies   Allergen Reactions    Other Swelling     Patient states that she gets hives and swells when she comes in contact with clorox or bleach products         Objective   First Vitals:   Blood Pressure: 112/71 (21 131)  Pulse: (!) 121 (21 131)  Temperature: 99 °F (37 2 °C) (21 1319)  Respirations: 20 (21 131)  Height: 4' 11" (149 9 cm) (21 131)  Weight - Scale: 59 kg (130 lb) (21 1312)  SpO2: 97 % (21 1312)    Current Vitals:   Blood Pressure: 108/52 (09/01/21 1830)  Pulse: 90 (09/01/21 1830)  Temperature: 99 °F (37 2 °C) (09/01/21 1319)  Respirations: 20 (09/01/21 1830)  Height: 4' 11" (149 9 cm) (09/01/21 1312)  Weight - Scale: 59 kg (130 lb) (09/01/21 1312)  SpO2: 92 % (09/01/21 1830)      Intake/Output Summary (Last 24 hours) at 9/1/2021 2022  Last data filed at 9/1/2021 1804  Gross per 24 hour   Intake 3300 ml   Output --   Net 3300 ml       Invasive Devices     Peripheral Intravenous Line            Peripheral IV 09/01/21 Right Antecubital <1 day                Physical Exam  Constitutional:       General: She is not in acute distress  Appearance: Normal appearance  She is not ill-appearing, toxic-appearing or diaphoretic  HENT:      Head: Normocephalic and atraumatic  Right Ear: External ear normal       Left Ear: External ear normal       Nose: Nose normal       Mouth/Throat:      Mouth: Mucous membranes are dry  Pharynx: Oropharynx is clear  Eyes:      Extraocular Movements: Extraocular movements intact  Conjunctiva/sclera: Conjunctivae normal    Cardiovascular:      Rate and Rhythm: Tachycardia present  Pulmonary:      Effort: Pulmonary effort is normal       Breath sounds: No wheezing  Chest:      Chest wall: No tenderness  Abdominal:      General: There is no distension  Palpations: Abdomen is soft  There is no mass  Tenderness: There is no abdominal tenderness  There is no guarding or rebound  Comments: Negative [place]   Musculoskeletal:         General: Normal range of motion  Cervical back: Normal range of motion  Skin:     General: Skin is warm  Coloration: Skin is not jaundiced  Findings: No bruising  Neurological:      General: No focal deficit present  Mental Status: She is alert and oriented to person, place, and time  Sensory: No sensory deficit  Motor: No weakness     Psychiatric:         Mood and Affect: Mood normal          Behavior: Behavior normal          Thought Content: Thought content normal          Judgment: Judgment normal          Lab Results:   CBC:   Lab Results   Component Value Date    WBC 14 77 (H) 09/01/2021    HGB 15 1 09/01/2021    HCT 45 7 09/01/2021    MCV 93 09/01/2021     09/01/2021    MCH 30 8 09/01/2021    MCHC 33 0 09/01/2021    RDW 12 3 09/01/2021    MPV 10 8 09/01/2021    NRBC 0 09/01/2021   , CMP:   Lab Results   Component Value Date    SODIUM 136 09/01/2021    K 3 6 09/01/2021     09/01/2021    CO2 26 09/01/2021    BUN 20 09/01/2021    CREATININE 0 69 09/01/2021    CALCIUM 9 7 09/01/2021    AST 10 09/01/2021    ALT 26 09/01/2021    ALKPHOS 89 09/01/2021    EGFR 105 09/01/2021   , Coagulation: No results found for: PT, INR, APTT, Urinalysis:   Lab Results   Component Value Date    COLORU Yellow 09/01/2021    CLARITYU Cloudy 09/01/2021    SPECGRAV 1 024 09/01/2021    PHUR 5 0 09/01/2021    LEUKOCYTESUR Negative 09/01/2021    NITRITE Negative 09/01/2021    GLUCOSEU Negative 09/01/2021    KETONESU Negative 09/01/2021    BILIRUBINUR Negative 09/01/2021    BLOODU Trace (A) 09/01/2021   , Amylase: No results found for: AMYLASE, Lipase:   Lab Results   Component Value Date    LIPASE 76 09/01/2021     Imaging: I have personally reviewed pertinent reports  EKG, Pathology, and Other Studies: I have personally reviewed pertinent reports          Lyanne Phalen, MD

## 2021-09-06 LAB
BACTERIA BLD CULT: NORMAL
BACTERIA BLD CULT: NORMAL

## 2021-09-08 ENCOUNTER — OFFICE VISIT (OUTPATIENT)
Dept: INTERNAL MEDICINE CLINIC | Facility: CLINIC | Age: 46
End: 2021-09-08

## 2021-09-08 VITALS
TEMPERATURE: 97.3 F | DIASTOLIC BLOOD PRESSURE: 73 MMHG | WEIGHT: 143 LBS | BODY MASS INDEX: 28.88 KG/M2 | SYSTOLIC BLOOD PRESSURE: 107 MMHG | HEART RATE: 88 BPM

## 2021-09-08 DIAGNOSIS — R10.10 UPPER ABDOMINAL PAIN: Primary | ICD-10-CM

## 2021-09-08 DIAGNOSIS — R19.7 INTERMITTENT DIARRHEA: ICD-10-CM

## 2021-09-08 DIAGNOSIS — K80.20 CALCULUS OF GALLBLADDER WITHOUT CHOLECYSTITIS WITHOUT OBSTRUCTION: ICD-10-CM

## 2021-09-08 DIAGNOSIS — K21.9 GASTROESOPHAGEAL REFLUX DISEASE, UNSPECIFIED WHETHER ESOPHAGITIS PRESENT: ICD-10-CM

## 2021-09-08 DIAGNOSIS — R11.0 NAUSEA: ICD-10-CM

## 2021-09-08 DIAGNOSIS — R14.0 ABDOMINAL BLOATING: ICD-10-CM

## 2021-09-08 PROCEDURE — 99214 OFFICE O/P EST MOD 30 MIN: CPT | Performed by: PHYSICIAN ASSISTANT

## 2021-09-08 PROCEDURE — 1036F TOBACCO NON-USER: CPT | Performed by: PHYSICIAN ASSISTANT

## 2021-09-08 PROCEDURE — 1111F DSCHRG MED/CURRENT MED MERGE: CPT | Performed by: PHYSICIAN ASSISTANT

## 2021-09-08 RX ORDER — OMEPRAZOLE 40 MG/1
40 CAPSULE, DELAYED RELEASE ORAL DAILY
Qty: 30 CAPSULE | Refills: 5 | Status: SHIPPED | OUTPATIENT
Start: 2021-09-08

## 2021-09-08 NOTE — PROGRESS NOTES
Assessment/Plan:      Diagnoses and all orders for this visit:    Upper abdominal pain  -     Ambulatory referral to Gastroenterology; Future    Nausea  -     Ambulatory referral to Gastroenterology; Future    Abdominal bloating  -     Ambulatory referral to Gastroenterology; Future    Intermittent diarrhea  -     Ambulatory referral to Gastroenterology; Future    Calculus of gallbladder without cholecystitis without obstruction  -     Ambulatory referral to General Surgery; Future  -     Ambulatory referral to Gastroenterology; Future    Gastroesophageal reflux disease, unspecified whether esophagitis present  -     omeprazole (PriLOSEC) 40 MG capsule; Take 1 capsule (40 mg total) by mouth daily      70-year-old female presenting today for ED follow-up on 09/01 for sudden-onset abdominal pain, nausea and vomiting  Patient was found to have elevated white blood cell count of 15 as well as tachycardia and hypotension  CT of the abdomen and pelvis thickening of the small intestine concerning for enteritis inflammatory versus infectious as well as gallstones  Initially according to documentation it appears they were going to admit her however general surgery consultation did not see a need for any acute surgical intervention  Reportedly patient symptoms then subsided and was able to tolerate solid and liquid and was given the option to stay or go home and patient chose to go home  Unfortunately patient reports that she still does have some abdominal pain in the upper abdomen as well as some intermittent nausea but is far less than what she was experiencing before  She still has less of an appetite  She is using Zofran about once a day to help her symptoms  She denies any fever or chills  At this point I do not see any need for other intervention however I did offer her referral to General surgery for outpatient follow-up    Patient also does have GI symptoms that started even prior to her acute symptoms including intermittent heartburn even on omeprazole 20 mg daily, intermittent diarrhea as well as abdominal bloating  She did report having endoscopy about 5 years ago and was told everything was fine then but states her symptoms have progressively worsened over time  Patient is agreeable to reestablishing with Gastroenterology as well, referral provided  Also, I will increase omeprazole to 40 mg daily  I did advise trying to keep a food log of any foods that may trigger diarrhea or bloating more so than others  I did advise a bland diet for the next week or 2 and provided her with a handout and instructions in after visit summary for appropriate treatment for gallstones  I also found a diet for gastritis and stomach ulcers, and though she may not specifically have those diagnoses I believe that diet would be helpful as well  Patient understands ED precautions with any acute onset abdominal pain, persistent vomiting, weakness, fever or chills that may require acute intervention  Otherwise she states she will make appointment with specialists with referrals provided  She will follow-up as planned in January, sooner if any other problems arise  Chief Complaint   Patient presents with    Follow-up     ER follow up    Nausea       Subjective:     Patient ID: Rodrigue Arvizu is a 40 y o  female  67-year-old female presenting today for ER follow-up on 09/01 presenting to the ED originally with abdominal pain as well as nausea and vomiting, sudden onset epigastric pain reportedly after breakfast   In workup, patient was noted to be tachycardic and remains so even after IV fluids developing hypotension  Significant abdominal tenderness on examination  White blood count was 49530  Initial CT was showing cholelithiasis and possible findings of enteritis  General surgery was consulted and patient was initially admitted        General surgery note reviewed showing no acute surgical intervention needed, no evidence of cholecystitis and suspected abdominal pain secondary to enteritis with small bowel thickening seen on CT  ED re-evaluation note also reviewed and patient noted that her abdominal pain had completely resolved in the ED and she would like to go home, per documentation  They did offer admission to her foot patient reportedly felt as though she can go home  Vital signs reportedly showed improvement in heart rate and blood pressure and reportedly was given solids and liquids prior to leaving and tolerated well  She was discharged on Zofran  Patient states since ED discharge, she still has slight upper abdominal discomfort  Intermittent nausea as well, she is taking the zofran, usually about once a day  She still has loss of appetite  States comes goes, can happen any time, not provoked after eating  No fever or chills  She never was evaluated for gallstones  She states she ate chicken prior to onset, nothing fried or greasy  No recent travel, she denies eating anything raw or spoiled  She states she does get diarrhea sometimes with certain foods  Also c/o abd bloating for a while, cannot identify any certain food triggers  Sometimes diary  She also has heartburn and acid  She does take omeprazole 20mg once daily and still reports heartburn about 2-3 x a week on average  Review of Systems   Constitutional: Negative  Respiratory: Negative  Cardiovascular: Negative  Gastrointestinal:        As in HPI   Genitourinary: Negative  Neurological: Negative  The following portions of the patient's history were reviewed and updated as appropriate: allergies, current medications, past family history, past medical history, past social history, past surgical history and problem list       Objective:     Physical Exam  Vitals reviewed  Constitutional:       General: She is not in acute distress       Appearance: She is not ill-appearing or toxic-appearing  Comments: BMI 28 88 - overweight   HENT:      Head: Normocephalic and atraumatic  Neck:      Vascular: Normal carotid pulses  No carotid bruit  Cardiovascular:      Rate and Rhythm: Normal rate and regular rhythm  Pulses:           Carotid pulses are 2+ on the right side and 2+ on the left side  Radial pulses are 2+ on the right side and 2+ on the left side  Dorsalis pedis pulses are 2+ on the right side and 2+ on the left side  Heart sounds: Normal heart sounds  Pulmonary:      Effort: Pulmonary effort is normal       Breath sounds: Normal breath sounds  Abdominal:      General: Abdomen is flat  Bowel sounds are normal       Palpations: Abdomen is soft  Tenderness: There is generalized abdominal tenderness (mild)  There is no right CVA tenderness, left CVA tenderness, guarding or rebound  Musculoskeletal:      Cervical back: Neck supple  Right lower leg: No edema  Left lower leg: No edema  Lymphadenopathy:      Head:      Right side of head: No submandibular or tonsillar adenopathy  Left side of head: No submandibular or tonsillar adenopathy  Neurological:      Mental Status: She is alert and oriented to person, place, and time  Psychiatric:         Mood and Affect: Mood normal          Speech: Speech normal          Behavior: Behavior normal  Behavior is cooperative           Vitals:    09/08/21 0751   BP: 107/73   BP Location: Left arm   Patient Position: Sitting   Cuff Size: Standard   Pulse: 88   Temp: (!) 97 3 °F (36 3 °C)   Weight: 64 9 kg (143 lb)

## 2021-09-08 NOTE — PATIENT INSTRUCTIONS
Diet for Stomach Ulcers and Gastritis  - NOT WHAT YOU HAVE BUT PLEASE CONSIDER A DIET LIKE THIS!!!    AMBULATORY CARE:   A diet for stomach ulcers and gastritis  is a meal plan that limits foods that irritate your stomach  Certain foods may worsen symptoms such as stomach pain, bloating, heartburn, or indigestion  Foods to limit or avoid:  You may need to avoid acidic, spicy, or high-fat foods  Not all foods affect everyone the same way  You will need to learn which foods worsen your symptoms and limit those foods  The following are some foods that may worsen ulcer or gastritis symptoms:  · Beverages:      ? Whole milk and chocolate milk    ? Hot cocoa and cola    ? Any beverage with caffeine    ? Regular and decaffeinated coffee    ? Peppermint and spearmint tea    ? Green and black tea, with or without caffeine    ? Orange and grapefruit juices    ? Drinks that contain alcohol    · Spices and seasonings:      ? Black and red pepper    ? Chili powder    ? Mustard seed and nutmeg    · Other foods:      ? Dairy foods made from whole milk or cream    ? Chocolate    ? Spicy or strongly flavored cheeses, such as jalapeno or black pepper    ? Highly seasoned, high-fat meats, such as sausage, salami, schaefer, ham, and cold cuts    ? Hot chiles and peppers    ? Tomato products, such as tomato paste, tomato sauce, or tomato juice    Foods to include:  Eat a variety of healthy foods from all the food groups  Eat fruits, vegetables, whole grains, and fat-free or low-fat dairy foods  Whole grains include whole-wheat breads, cereals, pasta, and brown rice  Choose lean meats, poultry (chicken and turkey), fish, beans, eggs, and nuts  A healthy meal plan is low in unhealthy fats, salt, and added sugar  Healthy fats include olive oil and canola oil  Ask your dietitian for more information about a healthy diet  Other helpful guidelines:   · Do not eat right before bedtime  Stop eating at least 2 hours before bedtime      · Eat small, frequent meals  Your stomach may tolerate small, frequent meals better than large meals  © Copyright [employer] 2021 Information is for End User's use only and may not be sold, redistributed or otherwise used for commercial purposes  All illustrations and images included in CareNotes® are the copyrighted property of A D [employer] M , [employer]  or Rosetta Varghese  The above information is an  only  It is not intended as medical advice for individual conditions or treatments  Talk to your doctor, nurse or pharmacist before following any medical regimen to see if it is safe and effective for you  Gallstones   AMBULATORY CARE:   Gallstones  are hard substances that form in your gallbladder or bile duct  Your gallbladder and bile duct are located on the right side of your abdomen, near your liver  Your gallbladder stores bile  Bile helps break down the fat that you eat  Your gallbladder also helps remove certain chemicals from your body  Common signs and symptoms of gallstones: The most common symptom is severe, constant pain in the right upper abdomen  It is usually just below the ribcage  The pain may also be felt in the right shoulder and between the shoulder blades  You may also have any of the following:  · Nausea and vomiting    · Feeling bloated    · Rainer-colored bowel movements    · Dark urine    Seek care immediately if:   · You have a fever and chills  · Your skin or eyes turn yellow  · You have severe pain in your upper abdomen, just below the right ribcage  Contact your healthcare provider if:   · You have nausea and are vomiting  · Your urine is dark  · You have rainer-colored bowel movements  · You have questions or concerns about your condition or care  Treatment:  You may not need treatment if you do not have signs or symptoms  Your healthcare provider may want to monitor the gallstones over time   You may need any of the following:  · Antinausea medicine may be given to calm your stomach and to help prevent vomiting  · Prescription pain medicine  may be given  Ask your healthcare provider how to take this medicine safely  Some prescription pain medicines contain acetaminophen  Do not take other medicines that contain acetaminophen without talking to your healthcare provider  Too much acetaminophen may cause liver damage  Prescription pain medicine may cause constipation  Ask your healthcare provider how to prevent or treat constipation  · Surgery  may be needed to remove your gallbladder  This may be done after symptoms become severe or you develop health problems from the gallstones  Your healthcare provider may recommend gallbladder removal before you develop symptoms  This may be done if you are at high risk for health problems  What you can do to manage or prevent gallstones:   · Eat a variety of healthy foods  This may help you have more energy and heal faster  Healthy foods include fruits, vegetables, whole-grain breads, low-fat dairy products, beans, lean meat, and fish  Ask if you need to be on a special diet  Try to eat regular meals during the day  This will help your gallbladder empty  · Exercise as directed  Talk to your healthcare provider about the best exercise plan for you  Exercise can help you lose weight and improve your health  · Manage your weight  If you are overweight, it is important to reach a healthy weight  You will need to lose weight slowly because rapid weight loss can increase your risk for gallstones  Talk to your healthcare provider about your weight  He or she can help you create a safe weight loss plan if you need to lose weight  Follow up with your healthcare provider as directed:  Write down your questions so you remember to ask them during your visits  © Copyright [employer] 2021 Information is for End User's use only and may not be sold, redistributed or otherwise used for commercial purposes   All illustrations and images included in CareNotes® are the copyrighted property of A D A M [employer] Inc  or Rosetta Varghese  The above information is an  only  It is not intended as medical advice for individual conditions or treatments  Talk to your doctor, nurse or pharmacist before following any medical regimen to see if it is safe and effective for you

## 2021-09-09 ENCOUNTER — OFFICE VISIT (OUTPATIENT)
Dept: GASTROENTEROLOGY | Facility: CLINIC | Age: 46
End: 2021-09-09
Payer: COMMERCIAL

## 2021-09-09 VITALS
HEART RATE: 80 BPM | BODY MASS INDEX: 27.88 KG/M2 | TEMPERATURE: 98.1 F | HEIGHT: 60 IN | WEIGHT: 142 LBS | SYSTOLIC BLOOD PRESSURE: 108 MMHG | DIASTOLIC BLOOD PRESSURE: 72 MMHG

## 2021-09-09 DIAGNOSIS — R14.0 ABDOMINAL BLOATING: ICD-10-CM

## 2021-09-09 DIAGNOSIS — R10.10 UPPER ABDOMINAL PAIN: ICD-10-CM

## 2021-09-09 DIAGNOSIS — R11.0 NAUSEA: ICD-10-CM

## 2021-09-09 DIAGNOSIS — K80.20 CALCULUS OF GALLBLADDER WITHOUT CHOLECYSTITIS WITHOUT OBSTRUCTION: ICD-10-CM

## 2021-09-09 DIAGNOSIS — R19.7 INTERMITTENT DIARRHEA: ICD-10-CM

## 2021-09-09 PROCEDURE — 99244 OFF/OP CNSLTJ NEW/EST MOD 40: CPT | Performed by: INTERNAL MEDICINE

## 2021-09-09 NOTE — PROGRESS NOTES
[facility] - Outpatient Consultation  Johanne Castillo 40 y o  female MRN: [id number]  Encounter: [id number]          ASSESSMENT AND PLAN:        1  Upper abdominal pain  Ambulatory referral to Gastroenterology    EGD   2  Nausea  Ambulatory referral to Gastroenterology   3  Abdominal bloating  Ambulatory referral to Gastroenterology    EGD   4  Intermittent diarrhea  Ambulatory referral to Gastroenterology   5  Calculus of gallbladder without cholecystitis without obstruction  Ambulatory referral to Gastroenterology       Will have her trial increased dose of omeprazole at 40 mg once daily, and instructed her to take it half hour before meal  Will proceed with EGD given worsening reflux and new onset bloating  The rationale for endoscopy with possible biopsy, possible polypectomy, with IV sedation as well as all risks, benefits, and alternatives were discussed with the patient in detail  Risks including but not limited to perforation, bleeding, need for blood transfusion or emergent surgery, and missed neoplasm were reviewed in detail with the patient  The patient demonstrates full understanding and wishes to proceed  F/u after EGD in 3-4 months   ______________________________________________________________    HPI:  Johanne Castillo is a 40y o  year old female who presents to the office for evaluation of abdominal pain  She prsented to the ED on 9/1 with upper abdominal pain, nausea/vomiting  She had CT which revealed thickening of the small intestine c/f enteritis and cholelithiasis  I have personally viewed the images in PACS  Pain improved and she was sent home  She saw her PCP yesterday and she was having some recurrence of symptoms and nausea, not as acute as her ED visit however  She has had GERD and takes omeprazole 20 mg daily  Symptoms have progressively worsened over the past coule years   Had an EGD 5 years ago which was reportedly normal      She has been feeling more bloated and feels like she is pregnant  She did not start the higher dose of 40 mg yet  She has an appointment with General surgery on 9/23, will evaluate for cholecystectomy  Diet: rice/beans, salads  With all the GI issues, she has not been eating much and eats rice/soup  Tries not to drink coffee  Avoids caffeine  REVIEW OF SYSTEMS:    CONSTITUTIONAL: Denies any fever, chills, rigors, and weight loss  HEENT: No earache or tinnitus  Denies hearing loss or visual disturbances  CARDIOVASCULAR: No chest pain or palpitations  RESPIRATORY: Denies any cough, hemoptysis, shortness of breath or dyspnea on exertion  GASTROINTESTINAL: As noted in the History of Present Illness  GENITOURINARY: No problems with urination  Denies any hematuria or dysuria  NEUROLOGIC: No dizziness or vertigo, denies headaches  MUSCULOSKELETAL: Denies any muscle or joint pain  SKIN: Denies skin rashes or itching  ENDOCRINE: Denies excessive thirst  Denies intolerance to heat or cold  PSYCHOSOCIAL: Denies depression or anxiety  Denies any recent memory loss         Historical Information   Past Medical History:   Diagnosis Date    GERD (gastroesophageal reflux disease)     Migraine     takes excederine migraine     Past Surgical History:   Procedure Laterality Date    DENTAL SURGERY       Social History   Social History     Substance and Sexual Activity   Alcohol Use Yes    Comment: social     Social History     Substance and Sexual Activity   Drug Use No     Social History     Tobacco Use   Smoking Status Never Smoker   Smokeless Tobacco Never Used     Family History   Problem Relation Age of Onset   [facility] Breast cancer Mother         58 y/o    [facility] BRCA1 Negative Mother     Diabetes Father     Hypertension Father     No Known Problems Brother     No Known Problems Daughter     No Known Problems Son     Breast cancer Maternal Grandmother 46    Lung cancer Maternal Grandmother 59    Alzheimer's disease Maternal Grandfather     Heart disease Paternal Grandmother     No Known Problems Paternal Grandfather     No Known Problems Brother     No Known Problems Brother     No Known Problems Son     Lung cancer Maternal Uncle 48    Throat cancer Cousin 39    BRCA1 Positive Cousin     Kidney cancer Cousin 39       Meds/Allergies       Current Outpatient Medications:     aspirin-acetaminophen-caffeine (EXCEDRIN MIGRAINE) 250-250-65 MG per tablet    diphenhydrAMINE (BENADRYL) 50 mg capsule    EPINEPHrine (EPIPEN) 0 3 mg/0 3 mL SOAJ    levonorgestrel (MIRENA) 20 MCG/24HR IUD    omeprazole (PriLOSEC) 40 MG capsule    ondansetron (ZOFRAN-ODT) 4 mg disintegrating tablet    triamcinolone (KENALOG) 0 1 % ointment    Allergies   Allergen Reactions    Other Swelling     Patient states that she gets hives and swells when she comes in contact with clorox or bleach products  Objective     Blood pressure 108/72, pulse 80, temperature 98 1 °F (36 7 °C), temperature source Tympanic, height 5' (1 524 m), weight 64 4 kg (142 lb), not currently breastfeeding  Body mass index is 27 73 kg/m²  PHYSICAL EXAM:      General Appearance:   Alert, cooperative, no distress   HEENT:   Normocephalic, atraumatic, anicteric  Lungs:   Equal chest rise and unlabored breathing, normal cough   Heart:   No visualized JVD   Abdomen:   Soft, non-tender, non-distended; no masses, no organomegaly    Genitalia:   Deferred    Rectal:   Deferred    Extremities:  No cyanosis, clubbing or edema    Pulses:  Musculoskeletal:  2+ and symmetric  Normal range of motion visualized    Skin:  Neuro:  No jaundice, rashes, or lesions   Alert and appropriate       Lab Results:   No visits with results within 1 Day(s) from this visit     Latest known visit with results is:   Admission on 09/01/2021, Discharged on 09/01/2021   Component Date Value    WBC 09/01/2021 14 77*    RBC 09/01/2021 4 90     Hemoglobin 09/01/2021 15 1     Hematocrit 09/01/2021 45 7     MCV 09/01/2021 93     MCH 09/01/2021 30 8     MCHC 09/01/2021 33 0     RDW 09/01/2021 12 3     MPV 09/01/2021 10 8     Platelets 74/39/6404 289     nRBC 09/01/2021 0     Neutrophils Relative 09/01/2021 86*    Immat GRANS % 09/01/2021 1     Lymphocytes Relative 09/01/2021 7*    Monocytes Relative 09/01/2021 6     Eosinophils Relative 09/01/2021 0     Basophils Relative 09/01/2021 0     Neutrophils Absolute 09/01/2021 12 72*    Immature Grans Absolute 09/01/2021 0 07     Lymphocytes Absolute 09/01/2021 1 06     Monocytes Absolute 09/01/2021 0 88     Eosinophils Absolute 09/01/2021 0 02     Basophils Absolute 09/01/2021 0 02     Sodium 09/01/2021 136     Potassium 09/01/2021 3 6     Chloride 09/01/2021 103     CO2 09/01/2021 26     ANION GAP 09/01/2021 7     BUN 09/01/2021 20     Creatinine 09/01/2021 0 69     Glucose 09/01/2021 108     Calcium 09/01/2021 9 7     AST 09/01/2021 10     ALT 09/01/2021 26     Alkaline Phosphatase 09/01/2021 89     Total Protein 09/01/2021 8 4*    Albumin 09/01/2021 4 1     Total Bilirubin 09/01/2021 0 73     eGFR 09/01/2021 105     Lipase 09/01/2021 76     Troponin I 09/01/2021 <0 02     Blood Culture 09/01/2021 No Growth After 5 Days   Blood Culture 09/01/2021 No Growth After 5 Days       LACTIC ACID 09/01/2021 1 6     Procalcitonin 09/01/2021 <0 05     Color, UA 09/01/2021 Yellow     Clarity, UA 09/01/2021 Cloudy     Specific Gravity, UA 09/01/2021 1 024     pH, UA 09/01/2021 5 0     Leukocytes, UA 09/01/2021 Negative     Nitrite, UA 09/01/2021 Negative     Protein, UA 09/01/2021 Negative     Glucose, UA 09/01/2021 Negative     Ketones, UA 09/01/2021 Negative     Urobilinogen, UA 09/01/2021 0 2     Bilirubin, UA 09/01/2021 Negative     Blood, UA 09/01/2021 Trace*    SARS-CoV-2 09/01/2021 Negative     RBC, UA 09/01/2021 2-4     WBC, UA 09/01/2021 4-10*    Epithelial Cells 09/01/2021 Occasional     Bacteria, UA 09/01/2021 Occasional     Hyaline Casts, UA 09/01/2021 5-10*    Ventricular Rate 09/01/2021 113     Atrial Rate 09/01/2021 113     ID Interval 09/01/2021 128     QRSD Interval 09/01/2021 72     QT Interval 09/01/2021 320     QTC Interval 09/01/2021 438     P Axis 09/01/2021 57     QRS Axis 09/01/2021 22     T Wave Axis 09/01/2021 27          Radiology Results:   XR chest 1 view portable    Result Date: 9/1/2021  Narrative: CHEST INDICATION:   tachycardia  COMPARISON:  None EXAM PERFORMED/VIEWS:  XR CHEST PORTABLE FINDINGS: Cardiomediastinal silhouette appears unremarkable  The lungs are clear  No pneumothorax or pleural effusion  Osseous structures appear within normal limits for patient age  Impression: No acute cardiopulmonary disease  Workstation performed: [id number]     US right upper quadrant    Result Date: 9/1/2021  Narrative: RIGHT UPPER QUADRANT ULTRASOUND INDICATION:     epigastric and RUQ pain, cholelithiasis  COMPARISON:  CT performed earlier today  TECHNIQUE:   Real-time ultrasound of the right upper quadrant was performed with a curvilinear transducer with both volumetric sweeps and still imaging techniques  FINDINGS: PANCREAS:  Visualized portions of the pancreas are within normal limits  AORTA AND IVC:  Visualized portions are normal for patient age  LIVER: Size:  Within normal range  The liver measures 14 cm in the midclavicular line  Contour:  Surface contour is smooth  Parenchyma:  Echogenicity and echotexture are within normal limits  No evidence of suspicious mass  Limited imaging of the main portal vein shows it to be patent and hepatopetal   BILIARY: The gallbladder is normal in caliber  No wall thickening or pericholecystic fluid  Shadowing gallstone(s) identified  No sonographic Saenz's sign  No intrahepatic biliary dilatation  CBD measures 3 mm  No choledocholithiasis  KIDNEY: Right kidney measures 11 1 cm  Within normal limits  ASCITES:   None  Impression: Cholelithiasis, with no evidence of acute cholecystitis  Normal caliber common bile duct  Workstation performed: [id number]     CT abdomen pelvis with contrast    Result Date: 9/1/2021  Narrative: CT ABDOMEN AND PELVIS WITH IV CONTRAST INDICATION:  Midline abdominal pain, N/V, leukocytosis  COMPARISON:  Pelvic ultrasound 1/13/2021 TECHNIQUE:  CT examination of the abdomen and pelvis was performed  Axial, sagittal, and coronal 2D reformatted images were created from the source data and submitted for interpretation  Radiation dose length product (DLP) for this visit:  425 41 mGy-cm  This examination, like all CT scans performed in the [facility], was performed utilizing techniques to minimize radiation dose exposure, including the use of iterative reconstruction and automated exposure control  IV Contrast:  100 mL of iohexol (OMNIPAQUE) Enteric Contrast:  Enteric contrast was not administered  FINDINGS: ABDOMEN LOWER CHEST:  Minor dependent hypoventilatory changes in the lung bases  LIVER/BILIARY TREE:  Unremarkable  GALLBLADDER:  Calcified gallstones  No pericholecystic inflammatory change  SPLEEN:  Unremarkable  PANCREAS:  Unremarkable  ADRENAL GLANDS:  Unremarkable  KIDNEYS/URETERS:  Unremarkable  No hydronephrosis  STOMACH AND BOWEL:  The stomach is grossly unremarkable accounting for degree of distention  The small bowel is normal caliber  Suggestion of mild wall thickening of small bowel loops in the right mid to lower abdomen which could be indicative of infectious or inflammatory enteritis  No significant inflammatory changes seen in the adjacent mesentery  No evidence of abscess  Radiopaque density noted within central small bowel loop, presumably ingested material  APPENDIX:  A normal appendix is visualized  ABDOMINOPELVIC CAVITY:  No ascites  No pneumoperitoneum  No pathologic lymphadenopathy  VESSELS:  Unremarkable for patient's age   PELVIS REPRODUCTIVE ORGANS: IUD within the central uterus  URINARY BLADDER:  Largely collapsed, otherwise grossly unremarkable  ABDOMINAL WALL/INGUINAL REGIONS:  Unremarkable  OSSEOUS STRUCTURES:  No acute fracture or destructive osseous lesion  Impression: Suggestion of mild wall thickening small bowel loops right mid to lower abdomen which could be indicative of infectious or inflammatory enteritis  No evidence of bowel obstruction or inflammatory changes in the mesentery  Uncomplicated cholelithiasis   Workstation performed: [id number]

## 2021-09-09 NOTE — PATIENT INSTRUCTIONS
EGD scheduled for Friday, 11/12/21, at [facility] with Dr An Dickens  Prep instructions provided to patient in office

## 2021-09-10 ENCOUNTER — EVALUATION (OUTPATIENT)
Dept: PHYSICAL THERAPY | Facility: CLINIC | Age: 46
End: 2021-09-10
Payer: COMMERCIAL

## 2021-09-10 DIAGNOSIS — G89.29 CHRONIC LEFT-SIDED LOW BACK PAIN WITH LEFT-SIDED SCIATICA: ICD-10-CM

## 2021-09-10 DIAGNOSIS — M54.42 CHRONIC LEFT-SIDED LOW BACK PAIN WITH LEFT-SIDED SCIATICA: ICD-10-CM

## 2021-09-10 PROCEDURE — 97162 PT EVAL MOD COMPLEX 30 MIN: CPT | Performed by: PHYSICAL THERAPIST

## 2021-09-10 PROCEDURE — 97112 NEUROMUSCULAR REEDUCATION: CPT | Performed by: PHYSICAL THERAPIST

## 2021-09-10 NOTE — PROGRESS NOTES
Physical Therapy Initial Evaluation    Today's date: 9/10/2021  Patient name: Conchis Gillette  : 1975  MRN: [id number]  Referring provider: Deondre Delaney  Dx:   Encounter Diagnosis     ICD-10-CM    1  Chronic left-sided low back pain with left-sided sciatica  M54 42 Ambulatory referral to Physical Therapy    G89 29                   Assessment  Assessment details: Pt is 39 y/o female who would benefit prom [facility] PT tx for pain man  techniques, postural education, body mechanics, back/ LE strengthening as randy  Impairments: abnormal gait, abnormal or restricted ROM, activity intolerance, impaired physical strength, pain with function, poor posture  and poor body mechanics  Understanding of Dx/Px/POC: excellent  Goals  (up to 4 weeks)  1  Independent and safe with back HEP  2  Independent and safe with self-postural correction with use of L-roll  3  Independent and safe with lifting body mechanics  4  B LE MMT 5/5 t/o - no limitations to max standing dynamic ADL's at home/work  Plan  Patient would benefit from: skilled physical therapy  Planned modality interventions: low level laser therapy  Planned therapy interventions: joint mobilization, manual therapy, neuromuscular re-education, patient education, postural training, strengthening, stretching, therapeutic activities, therapeutic exercise, therapeutic training, transfer training, home exercise program, graded activity, graded exercise, functional ROM exercises, flexibility, body mechanics training, activity modification and abdominal trunk stabilization  Frequency: 2x week  Duration in weeks: 4  Treatment plan discussed with: patient        Subjective Evaluation    History of Present Illness  Date of onset: 7/10/2021  Mechanism of injury: Pt is 39 y/o female with Hx of L sided CLBP, recently c/o new onset of L LBP with radic to post thigh  Pt went to see her PCP and was referred to [facility] PT for evaluation and tx    Current functional limitations: pain with sitting, pain with prolong amb/standing  (+) discomfort at night - no position of comfort  Recurrent probem    Quality of life: good    Pain  Current pain ratin  At best pain ratin  At worst pain ratin  Quality: dull ache  Relieving factors: change in position and relaxation  Aggravating factors: sitting, standing, walking and lifting  Progression: worsening    Social Support  Steps to enter house: no  Stairs in house: yes (1 flight of stairs with b/l HR)   Lives in: multiple-level home  Lives with: spouse and adult children    Employment status: working  Hand dominance: right      Diagnostic Tests  No diagnostic tests performed  Treatments  Previous treatment: medication  Current treatment: medication  Patient Goals  Patient goals for therapy: decreased pain and independence with ADLs/IADLs          Objective     Static Posture   General Observations  Symmetrical weight bearing  Head  Forward  Shoulders  Rounded  Thoracic Spine  Flattened thoracic spine  Lumbar Spine   Flattened and decreased lordosis       Postural Observations  Seated posture: poor  Standing posture: poor  Correction of posture: makes symptoms better    Additional Postural Observation Details  Educated on L-roll use (purpose/benefits)    Active Range of Motion   Cervical/Thoracic Spine       Thoracic    Flexion:  WFL  Extension:  Restriction level: moderate  Left lateral flexion:  Restriction level: minimal  Right lateral flexion:  Restriction level: minimal  Left rotation:  Restriction level: minimal  Right rotation:  Restriction level: minimal    Lumbar   Flexion:  WFL  Extension:  Restriction level: moderate  Left lateral flexion:  Restriction level: minimal  Right lateral flexion:  Restriction level: minimal  Left rotation:  Restriction level: minimal  Right rotation:  Restriction level: minimal    Strength/Myotome Testing     Lumbar     Right   Normal strength    Left Hip   Planes of Motion   Flexion: 4  Abduction: 4  Adduction: 4  External rotation: 5  Internal rotation: 5    Left Knee   Flexion: 5  Extension: 4    Left Ankle/Foot   Plantar flexion: 5  Inversion: 5    Tests     Lumbar     Left   Positive passive SLR  Additional Tests Details  Decreased L HS flexibility  Ambulation     Ambulation: Level Surfaces   Ambulation without assistive device: independent    Ambulation: Stairs   Ascend stairs: independent  Pattern: non-reciprocal  Railings: two rails  Descend stairs: independent  Pattern: non-reciprocal  Railings: two rails  Curbs: independent    Observational Gait   Gait: antalgic and asymmetric   Decreased walking speed, stride length, left stance time, left swing time and left step length  Left foot contact pattern: heel to toe  Base of support: normal    General Comments:      Lumbar Comments  As per pt, some comfort in prone position over pillows               Precautions: not any given      Manuals 9/10            PA mobs to LB             LA to LB                                       Neuro Re-Ed                          L-roll use - education on benefits/purpose 10'                         UBE/L-roll use/b'kwrd                                                    Ther Ex             Prone/RACHAEL demo            REIL demo            Prone alt LE ext demo                                                                             Ther Activity             HEPO review demo            Body [facility] edu/lifting/carrying             Gait Training                                       Modalities

## 2021-09-14 ENCOUNTER — OFFICE VISIT (OUTPATIENT)
Dept: PHYSICAL THERAPY | Facility: CLINIC | Age: 46
End: 2021-09-14
Payer: COMMERCIAL

## 2021-09-14 DIAGNOSIS — G89.29 CHRONIC LEFT-SIDED LOW BACK PAIN WITH LEFT-SIDED SCIATICA: Primary | ICD-10-CM

## 2021-09-14 DIAGNOSIS — M54.42 CHRONIC LEFT-SIDED LOW BACK PAIN WITH LEFT-SIDED SCIATICA: Primary | ICD-10-CM

## 2021-09-14 PROCEDURE — 97112 NEUROMUSCULAR REEDUCATION: CPT | Performed by: PHYSICAL THERAPIST

## 2021-09-14 PROCEDURE — 97110 THERAPEUTIC EXERCISES: CPT | Performed by: PHYSICAL THERAPIST

## 2021-09-14 PROCEDURE — 97140 MANUAL THERAPY 1/> REGIONS: CPT | Performed by: PHYSICAL THERAPIST

## 2021-09-14 NOTE — PROGRESS NOTES
Daily Note     Today's date: 2021  Patient name: Dominic Cruz  : 1975  MRN: [id number]  Referring provider: Rosa Lepe  Dx:   Encounter Diagnosis     ICD-10-CM    1  Chronic left-sided low back pain with left-sided sciatica  M54 42     G89 29                   Subjective: C/o 2/10 centralized LB discomfort verbalized this morning  Already some improvement noted with HEP  Objective: See treatment diary below      Assessment: Tolerated treatment well  Patient demonstrated fatigue post treatment, exhibited good technique with therapeutic exercises and would benefit from continued PT  HEP was updated and reviewed  Plan: Continue per plan of care  Progress treatment as tolerated         Precautions: not any given      Manuals 9/10 9/14           PA mobs to LB  SZ           LA to LB                                       Neuro Re-Ed                          L-roll use - education on benefits/purpose 10'                         UBE/L-roll use/b'kwrd  standing 8'                                                  Ther Ex             Prone/RACHAEL demo 3'           REIL demo 10x           Prone alt LE ext demo 10x ea           Prone glut sets  10x3"           FANTASMA  5x           Postural TB mid/low  Blut TB 10x2                                     Ther Activity             HEP review demo 5'           Body [facility] edu/lifting/carrying             Gait Training                                       Modalities               7'

## 2021-09-16 ENCOUNTER — OFFICE VISIT (OUTPATIENT)
Dept: PHYSICAL THERAPY | Facility: CLINIC | Age: 46
End: 2021-09-16
Payer: COMMERCIAL

## 2021-09-16 DIAGNOSIS — G89.29 CHRONIC LEFT-SIDED LOW BACK PAIN WITH LEFT-SIDED SCIATICA: Primary | ICD-10-CM

## 2021-09-16 DIAGNOSIS — M54.42 CHRONIC LEFT-SIDED LOW BACK PAIN WITH LEFT-SIDED SCIATICA: Primary | ICD-10-CM

## 2021-09-16 PROCEDURE — 97112 NEUROMUSCULAR REEDUCATION: CPT | Performed by: PHYSICAL THERAPIST

## 2021-09-16 PROCEDURE — 97110 THERAPEUTIC EXERCISES: CPT | Performed by: PHYSICAL THERAPIST

## 2021-09-16 PROCEDURE — 97140 MANUAL THERAPY 1/> REGIONS: CPT | Performed by: PHYSICAL THERAPIST

## 2021-09-16 NOTE — PROGRESS NOTES
Daily Note     Today's date: 2021  Patient name: Cherelle Bradshaw  : 1975  MRN: [id number]  Referring provider: Melissa Rodriguez  Dx:   Encounter Diagnosis     ICD-10-CM    1  Chronic left-sided low back pain with left-sided sciatica  M54 42     G89 29                   Subjective: C/o centralized LBP today, about 4-5/10  Objective: See treatment diary below      Assessment: Tolerated treatment well  Patient exhibited good technique with therapeutic exercises and would benefit from continued PT  Plan: Continue per plan of care  Progress treatment as tolerated         Precautions: not any given      Manuals 9/10 9/14 9/16          PA mobs to LB  SZ SZ          LA to LB             LB TPR/STM   SZ                       Neuro Re-Ed                          L-roll use - education on benefits/purpose 10'                         UBE/L-roll use/b'kwrd  standing 8'                        Bike/posture   10'                       Ther Ex             Prone/RACHAEL demo 3' 3'          REIL demo 10x 10x self OP          Prone alt LE ext demo 10x ea           Prone glut sets  10x3" 10x3"          FANTASMA  5x 5x          Postural TB mid/low  Blut TB 10x2                                     Ther Activity             HEP review demo 5' 5'          Body [facility]/lifting/carrying             Gait Training                                       Modalities               7'  7'

## 2021-09-17 ENCOUNTER — APPOINTMENT (OUTPATIENT)
Dept: PHYSICAL THERAPY | Facility: CLINIC | Age: 46
End: 2021-09-17
Payer: COMMERCIAL

## 2021-09-21 ENCOUNTER — APPOINTMENT (OUTPATIENT)
Dept: PHYSICAL THERAPY | Facility: CLINIC | Age: 46
End: 2021-09-21
Payer: COMMERCIAL

## 2021-09-23 ENCOUNTER — TELEPHONE (OUTPATIENT)
Dept: GASTROENTEROLOGY | Facility: CLINIC | Age: 46
End: 2021-09-23

## 2021-09-23 ENCOUNTER — CONSULT (OUTPATIENT)
Dept: SURGERY | Facility: CLINIC | Age: 46
End: 2021-09-23
Payer: COMMERCIAL

## 2021-09-23 VITALS — HEIGHT: 64 IN | WEIGHT: 140 LBS | TEMPERATURE: 97.8 F | BODY MASS INDEX: 23.9 KG/M2

## 2021-09-23 DIAGNOSIS — K80.20 CALCULUS OF GALLBLADDER WITHOUT CHOLECYSTITIS WITHOUT OBSTRUCTION: ICD-10-CM

## 2021-09-23 PROCEDURE — 3008F BODY MASS INDEX DOCD: CPT | Performed by: PHYSICIAN ASSISTANT

## 2021-09-23 PROCEDURE — 1036F TOBACCO NON-USER: CPT | Performed by: STUDENT IN AN ORGANIZED HEALTH CARE EDUCATION/TRAINING PROGRAM

## 2021-09-23 PROCEDURE — 99242 OFF/OP CONSLTJ NEW/EST SF 20: CPT | Performed by: STUDENT IN AN ORGANIZED HEALTH CARE EDUCATION/TRAINING PROGRAM

## 2021-09-23 NOTE — PROGRESS NOTES
Office Visit - General Surgery  Jena Hurley MRN: [id number]  Encounter: [id number]    Assessment and Plan    Problem List Items Addressed This Visit     None      Visit Diagnoses     Calculus of gallbladder without cholecystitis without obstruction            -Patient does have cholelithiasis on her imaging without signs of cholecystitis  I am concerned with her reports of having frequent episodes of diarrhea with blood in her stool  She is also on Prilosec which she increased to 40mg    -She is scheduled to undergo EGD 11/2021  I have asked her to talk to GI to add on a colonoscopy with her recent changes in bowel habits    -It is reasonable after her GI work up to discuss cholecystectomy  She understands that surgery may not relieve all of her symptoms    -Educated on symptoms that would prompt ED evaluation: fever, unrelenting abdominal pain, nausea/emesis    -Follow up in clinic after GI evaluation  Chief Complaint:  Jena Hurley is a 40 y o  female who presents for Cholelithiasis (Consult gallbladder)    Subjective  This is a 40 yr old female with history of GERD who presents for evaluation of cholelithiasis  Per patient she has been having intermittent upper abdominal pain mostly epigastric radiating to bilateral upper abdomen and lower abdomen with associated episodes of diarrhea and blood in her stool for several weeks  Patient has not been able to correlate this with any specific foods  She states she gets nauseated after most things she eats  She has a history of GERD and is on 40mg of Prilosec daily  She is scheduled to undergo EGD 11/2021  She had a colonoscopy ~10 years ago per her report and was unremarkable  She has a family history of colon and breast cancer  No prior abdominal surgeries       Past Medical History  Past Medical History:   Diagnosis Date    GERD (gastroesophageal reflux disease)     Migraine     takes excederine migraine       Past Surgical History  Past Surgical History:   Procedure Laterality Date    DENTAL SURGERY         Family History  Family History   Problem Relation Age of Onset    Breast cancer Mother         58 y/o    Lilia Day BRCA1 Negative Mother     Diabetes Father     Hypertension Father     No Known Problems Brother     No Known Problems Daughter     No Known Problems Son     Breast cancer Maternal Grandmother 46    Lung cancer Maternal Grandmother 59    Alzheimer's disease Maternal Grandfather     Heart disease Paternal Grandmother     No Known Problems Paternal Grandfather     No Known Problems Brother     No Known Problems Brother     No Known Problems Son     Lung cancer Maternal Uncle 48    Throat cancer Cousin 39    BRCA1 Positive Cousin     Kidney cancer Cousin 39       Social History  Social History     Socioeconomic History    Marital status: /Civil Union     Spouse name: Siomara Lira    Number of children: 2    Years of education: None    Highest education level: None   Occupational History    Occupation:    Tobacco Use    Smoking status: Never Smoker    Smokeless tobacco: Never Used   Vaping Use    Vaping Use: Never used   Substance and Sexual Activity    Alcohol use: Yes     Comment: social    Drug use: No    Sexual activity: Yes     Partners: Male     Birth control/protection: I U D  Other Topics Concern    None   Social History Narrative    None     Social Determinants of Health     Financial Resource Strain: Medium Risk    Difficulty of Paying Living Expenses: Somewhat hard   Food Insecurity: Food Insecurity Present    Worried About Running Out of Food in the Last Year: Never true    Waqas of Food in the Last Year: Sometimes true   Transportation Needs: No Transportation Needs    Lack of Transportation (Medical): No    Lack of Transportation (Non-Medical):  No   Physical Activity: Inactive    Days of Exercise per Week: 0 days    Minutes of Exercise per Session: 0 min   Stress: No Stress Concern Present    Feeling of Stress : Only a little   Social Connections: Moderately Integrated    Frequency of Communication with Friends and Family: More than three times a week    Frequency of Social Gatherings with Friends and Family: More than three times a week    Attends Je[employer] witness Services: More than 4 times per year    Active Member of [employer] or Organizations: No    Attends Club or Organization Meetings: Never    Marital Status:    Intimate Partner Violence: Not At Risk    Fear of Current or Ex-Partner: No    Emotionally Abused: No    Physically Abused: No    Sexually Abused: No        Medications  Current Outpatient Medications on File Prior to Visit   Medication Sig Dispense Refill    aspirin-acetaminophen-caffeine (EXCEDRIN MIGRAINE) 250-250-65 MG per tablet Take 1 tablet by mouth every 6 (six) hours as needed for headaches      diphenhydrAMINE (BENADRYL) 50 mg capsule Take 1 capsule (50 mg total) by mouth every 6 (six) hours as needed for itching or allergies 12 capsule 0    EPINEPHrine (EPIPEN) 0 3 mg/0 3 mL SOAJ Inject 0 3 mL (0 3 mg total) into a muscle once as needed for anaphylaxis for up to 1 dose 0 6 mL 2    levonorgestrel (MIRENA) 20 MCG/24HR IUD 1 each by Intrauterine route once      omeprazole (PriLOSEC) 40 MG capsule Take 1 capsule (40 mg total) by mouth daily 30 capsule 5    ondansetron (ZOFRAN-ODT) 4 mg disintegrating tablet Take 1 tablet (4 mg total) by mouth every 6 (six) hours as needed for nausea or vomiting 20 tablet 0    triamcinolone (KENALOG) 0 1 % ointment Apply topically 2 (two) times a day To right forearm rash (Patient not taking: Reported on 9/8/2021) 30 g 0     No current facility-administered medications on file prior to visit  Allergies  Allergies   Allergen Reactions    Other Swelling     Patient states that she gets hives and swells when she comes in contact with clorox or bleach products         Review of Systems   Constitutional: Positive for appetite change  HENT: Negative  Eyes: Negative  Respiratory: Negative  Cardiovascular: Negative  Gastrointestinal: Positive for abdominal pain, blood in stool, diarrhea and nausea  Endocrine: Negative  Genitourinary: Negative  Musculoskeletal: Negative  Skin: Negative  Allergic/Immunologic: Negative  Neurological: Negative  Hematological: Negative  Psychiatric/Behavioral: Negative  Objective  Vitals:    09/23/21 0836   Temp: 97 8 °F (36 6 °C)       Physical Exam  Constitutional:       Appearance: Normal appearance  HENT:      Head: Normocephalic and atraumatic  Nose: Nose normal       Mouth/Throat:      Mouth: Mucous membranes are dry  Pharynx: Oropharynx is clear  Eyes:      Extraocular Movements: Extraocular movements intact  Pupils: Pupils are equal, round, and reactive to light  Cardiovascular:      Rate and Rhythm: Normal rate and regular rhythm  Pulses: Normal pulses  Pulmonary:      Effort: Pulmonary effort is normal    Abdominal:      Palpations: Abdomen is soft  Comments: Non tender, non distended     Musculoskeletal:         General: Normal range of motion  Cervical back: Normal range of motion and neck supple  Skin:     General: Skin is warm and dry  Capillary Refill: Capillary refill takes less than 2 seconds  Neurological:      General: No focal deficit present  Mental Status: She is alert     Psychiatric:         Mood and Affect: Mood normal

## 2021-09-23 NOTE — TELEPHONE ENCOUNTER
Pt came in office requesting procedure to be moved up sooner if possible, EGD scheduled for 11/12  Please advise

## 2021-09-28 ENCOUNTER — OFFICE VISIT (OUTPATIENT)
Dept: PHYSICAL THERAPY | Facility: CLINIC | Age: 46
End: 2021-09-28
Payer: COMMERCIAL

## 2021-09-28 DIAGNOSIS — G89.29 CHRONIC LEFT-SIDED LOW BACK PAIN WITH LEFT-SIDED SCIATICA: Primary | ICD-10-CM

## 2021-09-28 DIAGNOSIS — M54.42 CHRONIC LEFT-SIDED LOW BACK PAIN WITH LEFT-SIDED SCIATICA: Primary | ICD-10-CM

## 2021-09-28 PROCEDURE — 97110 THERAPEUTIC EXERCISES: CPT | Performed by: PHYSICAL THERAPIST

## 2021-09-28 PROCEDURE — 97112 NEUROMUSCULAR REEDUCATION: CPT | Performed by: PHYSICAL THERAPIST

## 2021-09-28 PROCEDURE — 97140 MANUAL THERAPY 1/> REGIONS: CPT | Performed by: PHYSICAL THERAPIST

## 2021-09-28 NOTE — PROGRESS NOTES
Daily Note     Today's date: 2021  Patient name: Dennie Ards  : 1975  MRN: [id number]  Referring provider: Eric Noriega  Dx:   Encounter Diagnosis     ICD-10-CM    1  Chronic left-sided low back pain with left-sided sciatica  M54 42     G89 29                   Subjective: "I am good today  Nothing is shooting down my leg, just a little bit in my back " 6/10 in LB today  Objective: See treatment diary below      Assessment: Tolerated treatment well  Patient demonstrated fatigue post treatment and would benefit from continued PT  Pt was educated on importance of daily HEP  No pain worsening post tx voiced  Plan: Continue per plan of care  Progress treatment as tolerated         Precautions: not any given      Manuals 9/10 9/14 9/16 9/28         PA mobs to LB  SZ SZ SZ         LA to LB             LB TPR/STM   SZ SZ                      Neuro Re-Ed                          L-roll use - education on benefits/purpose 10'                         UBE/L-roll use/b'kwrd  standing 8'                        Bike/posture/L-roll   10' 10'                      Ther Ex             Prone/RACHAEL demo 3' 3' 3'         REIL demo 10x 10x self OP 10x3" w self OP         Prone alt LE ext demo 10x ea  np         Prone glut sets  10x3" 10x3" 10x5"         FANTASMA  5x 5x 5x         Postural TB mid/low  Blue TB 10x2  Blue TB 10x2 ea         Bridging/TA/PF    10x5"         LTR as randy    10x         Supine/PF/TA/T-ball AD's    10x3"                                   Ther Activity             HEP review demo 5' 5' 5' Blue TB given         Body [facility] edu/lifting/carrying             Gait Training                                       Modalities               7'  7'          Biofreeze    applied

## 2021-09-30 ENCOUNTER — OFFICE VISIT (OUTPATIENT)
Dept: PHYSICAL THERAPY | Facility: CLINIC | Age: 46
End: 2021-09-30
Payer: COMMERCIAL

## 2021-09-30 DIAGNOSIS — G89.29 CHRONIC LEFT-SIDED LOW BACK PAIN WITH LEFT-SIDED SCIATICA: Primary | ICD-10-CM

## 2021-09-30 DIAGNOSIS — M54.42 CHRONIC LEFT-SIDED LOW BACK PAIN WITH LEFT-SIDED SCIATICA: Primary | ICD-10-CM

## 2021-09-30 PROCEDURE — 97110 THERAPEUTIC EXERCISES: CPT | Performed by: PHYSICAL THERAPIST

## 2021-09-30 PROCEDURE — 97140 MANUAL THERAPY 1/> REGIONS: CPT | Performed by: PHYSICAL THERAPIST

## 2021-09-30 NOTE — PROGRESS NOTES
Daily Note     Today's date: 2021  Patient name: Rea Dyer  : 1975  MRN: [id number]  Referring provider: Cass Benton  Dx:   Encounter Diagnosis     ICD-10-CM    1  Chronic left-sided low back pain with left-sided sciatica  M54 42     G89 29                   Subjective: "I feel good today "      Objective: See treatment diary below      Assessment: Tolerated treatment well  Patient exhibited good technique with therapeutic exercises and would benefit from continued PT for manual tx, core and back/abdominal strengthening and various stretching tech  No pain post tx voiced  Plan: Continue per plan of care  Progress treatment as tolerated         Precautions: not any given      Manuals 9/10 9/14 9/16 9/28 9/30        PA mobs to LB  SZ SZ SZ SZ        LA to LB    SZ SZ        LB TPR/STM   SZ SZ SZ                     Neuro Re-Ed                          L-roll use - education on benefits/purpose 10'                         UBE/L-roll use/b'kwrd  standing 8'                        Bike/posture/L-roll   10' 10'                      Ther Ex             Prone/RACHAEL demo 3' 3' 3' 5'        REIL demo 10x 10x self OP 10x3" w self OP 10x5" w self OP        Prone alt LE ext demo 10x ea  np         Prone glut sets  10x3" 10x3" 10x5"         FANTASMA  5x 5x 5x         Postural TB mid/low  Blue TB 10x2  Blue TB 10x2 ea         Bridging/TA/PF    10x5" 10x5"        LTR as randy    10x 10x        Supine/PF/TA/T-ball AD's    10x3" 10x5"                                  Ther Activity             HEP review demo 5' 5' 5' Blue TB given         Body [facility] edu/lifting/carrying             Gait Training                                       Modalities               7'  7'          Biofreeze    applied

## 2022-01-06 ENCOUNTER — PROCEDURE VISIT (OUTPATIENT)
Dept: OBGYN CLINIC | Facility: CLINIC | Age: 47
End: 2022-01-06

## 2022-01-06 ENCOUNTER — OFFICE VISIT (OUTPATIENT)
Dept: DENTISTRY | Facility: CLINIC | Age: 47
End: 2022-01-06

## 2022-01-06 VITALS — HEART RATE: 74 BPM | DIASTOLIC BLOOD PRESSURE: 69 MMHG | SYSTOLIC BLOOD PRESSURE: 111 MMHG | TEMPERATURE: 97.2 F

## 2022-01-06 VITALS
HEART RATE: 77 BPM | BODY MASS INDEX: 23.39 KG/M2 | DIASTOLIC BLOOD PRESSURE: 71 MMHG | WEIGHT: 137 LBS | SYSTOLIC BLOOD PRESSURE: 116 MMHG | HEIGHT: 64 IN

## 2022-01-06 DIAGNOSIS — Z12.31 ENCOUNTER FOR SCREENING MAMMOGRAM FOR MALIGNANT NEOPLASM OF BREAST: ICD-10-CM

## 2022-01-06 DIAGNOSIS — N93.9 ABNORMAL UTERINE BLEEDING (AUB): Primary | ICD-10-CM

## 2022-01-06 DIAGNOSIS — Z00.00 ENCOUNTER FOR SCREENING AND PREVENTATIVE CARE: Primary | ICD-10-CM

## 2022-01-06 LAB — SL AMB POCT URINE HCG: NEGATIVE

## 2022-01-06 PROCEDURE — 88305 TISSUE EXAM BY PATHOLOGIST: CPT | Performed by: PATHOLOGY

## 2022-01-06 PROCEDURE — 58100 BIOPSY OF UTERUS LINING: CPT | Performed by: OBSTETRICS & GYNECOLOGY

## 2022-01-06 PROCEDURE — 81025 URINE PREGNANCY TEST: CPT | Performed by: OBSTETRICS & GYNECOLOGY

## 2022-01-06 PROCEDURE — D0220 INTRAORAL - PERIAPICAL FIRST RADIOGRAPHIC IMAGE: HCPCS | Performed by: DENTIST

## 2022-01-06 PROCEDURE — D0140 LIMITED ORAL EVALUATION - PROBLEM FOCUSED: HCPCS | Performed by: DENTIST

## 2022-01-06 NOTE — PROGRESS NOTES
41 y/o F patient presented for a limited exam  [facility] reviewed  The patient presented with the CC of having a hole in one of her teeth  The patient reports intermittent pain (typically exacerbated by eating) arising from the tooth  A PA was taken of the tooth which shows severe caries and missing tooth structure - recommended extraction  No sinus tract or abscess visualized today  The patient stated that she is not in pain during this appointment and when she does feel pain, it does not keep her awake at night  Recommended that the patient return for an extraction on an OMFS day  Following that appointment, recommended that the patient return for a comp exam and FMX for further treatment planning  The patient reported understanding of our recommendations  She was dismissed satisfied with treatment       NV: Extract #30

## 2022-01-06 NOTE — PROGRESS NOTES
Subjective: Tawnya Sarabia is a 40 y o  [id number] female who presents for follow up regarding AUB  Patient reports that she has had AUB since 2020  She has an IUD that was placed in 2017 that she says was for abnormal menses and worked for the first 2-3 yrs  She was seen in January of 2021 when she reported the resumption of her menses that at time she states were lasting 2-3 weeks every month, but mostly spotting  She states in 2021 her periods are irregular and last 2 weeks and are very heavy when they come  She states she experiences left side pelvic pain and back pain with her periods  She denies feeling dizzy or lightheaded during periods  She was given a trial of Depo Provera last year but that did not help her periods  Ultrasound in January 2021 showed normal size uterus with a small submucosal fibroid and fundal IUD  Objective:    Vitals: Blood pressure 116/71, pulse 77, height 5' 4" (1 626 m), weight 62 1 kg (137 lb), last menstrual period 12/19/2021, not currently breastfeeding  Body mass index is 23 52 kg/m²  Endometrial biopsy    Date/Time: 1/6/2022 5:07 PM  Performed by: Angel Paul MD  Authorized by: Angel Paul MD   Universal Protocol:  Consent: Verbal consent obtained  Written consent obtained  Risks and benefits: risks, benefits and alternatives were discussed  Consent given by: patient  Patient understanding: patient states understanding of the procedure being performed  Patient consent: the patient's understanding of the procedure matches consent given  Procedure consent: procedure consent matches procedure scheduled  Relevant documents: relevant documents present and verified  Test results: test results available and properly labeled  Patient identity confirmed: verbally with patient      Indication:     Indications:  Other disorder of menstruation and other abnormal bleeding from female genital tract    Procedure:     Procedure: endometrial biopsy with Pipelle      A bivalve speculum was placed in the vagina: yes      Cervix cleaned and prepped: yes      A paracervical block was performed: no      An intracervical block was performed: no      The cervix was dilated: yes      Uterus sounded: yes      Uterus sound depth (cm):  6 5    Curettes used:  1    Specimen collected: specimen collected and sent to pathology      Patient tolerated procedure well with no complications: yes    Findings:     Uterus size:  6-8 weeks    Cervix: normal    Comments:     Procedure comments:  IUD strings were not visualized at the os        Assessment/Plan:    AUB:  Patient has tried and failed medical management for AUB  Ultrasound is normal aside from small submucosal fibroid  Her pap smear, TSH , HgA1C, CBC, and CMP were all normal in January  Discussed with patient the treatment options for AUB outside of medical management that include endometrial ablation and hysterectomy  I discussed that before we proceed with any of those options an EMB should be performed which was performed in office today  Patient agreed to endometrial ablation and IUD removal  Consent was signed in office 1/6/22  The patient was counseled regarding indications, risks, benefits and alternatives to surgical management  We discussed risks including but not limited to bleeding and potential need for blood transfusions, infection, pain, and uterine perforation with injury to surrounding structures including bowel, bladder, nerves, and blood vessels  Patient understands potential risks associated with stress of surgery and general anesthesia including but not limited to acute cardiac events, venous thromboembolism, etc   All questions answered to patient's satisfaction  Patient agrees and wants to proceed  Script for mammogram given today  Last mammogram in January 2021          Jose Foss MD  1/6/2022  4:10 PM

## 2022-01-14 ENCOUNTER — DOCUMENTATION (OUTPATIENT)
Dept: OTHER | Facility: HOSPITAL | Age: 47
End: 2022-01-14

## 2022-01-14 NOTE — QUICK NOTE
Surgical Committee Review:   Patient consented for EUA, removal of IUD, hysteroscopy, and endometrial ablation and all other indicated procedures with Dr Lisa Kay on 1/6/22  She has failed medical management of AUB  IUD is located in the uterus but was unable to be removed in the office  In office EMB was inadquate for evaluation  Therefore recommendations are as follows:   1  Add D&C at time of hysteroscopy (will need consent form to be addended) for endometrial sampling and may proceed with endometrial ablation after discussing with patient the small chance that intraoperative sample could reveal hyperplasia and/or carcinoma and therefore she would require further surgery  2  Patient should have documented method of birth control after IUD removal as ablation is not an effective method of contraception  Surgery approved pending the above recommendations and patient will need to RTO for discussion and H&P prior to surgery date  Discussed with Dr George Finn MD  OB/GYN  1/14/2022  2:38 PM

## 2022-01-20 ENCOUNTER — PREP FOR PROCEDURE (OUTPATIENT)
Dept: OBGYN CLINIC | Facility: CLINIC | Age: 47
End: 2022-01-20

## 2022-01-31 ENCOUNTER — OFFICE VISIT (OUTPATIENT)
Dept: OBGYN CLINIC | Facility: CLINIC | Age: 47
End: 2022-01-31

## 2022-01-31 VITALS
HEIGHT: 64 IN | BODY MASS INDEX: 23.63 KG/M2 | SYSTOLIC BLOOD PRESSURE: 121 MMHG | HEART RATE: 82 BPM | WEIGHT: 138.4 LBS | DIASTOLIC BLOOD PRESSURE: 80 MMHG

## 2022-01-31 DIAGNOSIS — N93.9 ABNORMAL UTERINE BLEEDING: Primary | ICD-10-CM

## 2022-01-31 PROCEDURE — 99213 OFFICE O/P EST LOW 20 MIN: CPT | Performed by: OBSTETRICS & GYNECOLOGY

## 2022-02-01 NOTE — ASSESSMENT & PLAN NOTE
- Patient is reporting abnormal uterine bleeding in the setting of perimenopausal age  She has been consented for Hysteroscopy, endometrial ablation  Prior endometrial biopsy trial at office, EMB resulted as insufficient tissue for exam  We discussed with patient and revised the surgery and consent for IUD removal, Dilation and curettage, hysteroscopy and endometrial ablation    - We  Discussed with patient endometrial ablation is not a contraception method and she may conceive and this can cause pregnancy complications  She agreed to get contraception as Depo provera after procedure  - Surgical consent obtained and Hibiclen body wash was givem

## 2022-02-01 NOTE — PROGRESS NOTES
H&P Exam - Gynecology   Branden Park 40 y o  female MRN: [id number]  Unit/Bed#:  Encounter: [id number]      History of Present Illness     HPI:  Branden Park is a 40 y o  female who presents with AUB since 2022  She has IUD in place in 2017 which not visualized during pelvic exam  Recent US is significant for submucosal fibroid and menorrhagia persistent after Depo provera  She desire a definitive treatment as hysterectomy or whatever the provider recommended  Her PAP smear is negative for intraepithelial neoplasia in 2021 and EMB was insufficient  Past medical history migraine, GERD and no abdominal surgery  Decline urinary urgency, frequency and blood in urine  Decline constipation and bowel movements changes , early satiety  Review of Systems   Constitutional: Negative  HENT: Negative  Respiratory: Negative  Cardiovascular: Negative  Gastrointestinal: Negative  Genitourinary: Negative  Musculoskeletal: Negative  Psychiatric/Behavioral: Negative          Historical Information   Past Medical History:   Diagnosis Date    GERD (gastroesophageal reflux disease)     Migraine     takes excederine migraine     Past Surgical History:   Procedure Laterality Date    DENTAL SURGERY       OB/GYN History: P3, vaginal deliveries  Family History   Problem Relation Age of Onset    Breast cancer Mother         58 y/o    Angelroseanna Carlos BRCA1 Negative Mother     Diabetes Father     Hypertension Father     No Known Problems Brother     No Known Problems Daughter     No Known Problems Son     Breast cancer Maternal Grandmother 46    Lung cancer Maternal Grandmother 59    Alzheimer's disease Maternal Grandfather     Heart disease Paternal Grandmother     No Known Problems Paternal Grandfather     No Known Problems Brother     No Known Problems Brother     No Known Problems Son     Lung cancer Maternal Uncle 48    Throat cancer Cousin 39    BRCA1 Positive Cousin     Kidney cancer Cousin 39 Social History   Social History     Substance and Sexual Activity   Alcohol Use Never    Comment: social     Social History     Substance and Sexual Activity   Drug Use No     Social History     Tobacco Use   Smoking Status Never Smoker   Smokeless Tobacco Never Used       Meds/Allergies   (Not in a hospital admission)    Allergies   Allergen Reactions    Other Swelling     Patient states that she gets hives and swells when she comes in contact with clorox or bleach products  Objective   /80 (BP Location: Right arm, Patient Position: Sitting, Cuff Size: Adult)   Pulse 82   Ht 5' 4" (1 626 m)   Wt 62 8 kg (138 lb 6 4 oz)   BMI 23 76 kg/m²     [unfilled]    Physical Exam  Constitutional:       Appearance: Normal appearance  She is normal weight  Cardiovascular:      Rate and Rhythm: Normal rate  Pulses: Normal pulses  Pulmonary:      Effort: Pulmonary effort is normal    Abdominal:      Palpations: Abdomen is soft  Musculoskeletal:         General: Normal range of motion  Cervical back: Normal range of motion  Skin:     General: Skin is warm  Neurological:      General: No focal deficit present  Mental Status: She is alert and oriented to person, place, and time  Psychiatric:         Mood and Affect: Mood normal          Behavior: Behavior normal          Lab Results:   No visits with results within 1 Day(s) from this visit     Latest known visit with results is:   Procedure visit on 01/06/2022   Component Date Value    URINE HCG 01/06/2022 negative     Case Report 01/06/2022                      Value:Surgical Pathology Report                         Case: [id number]                                   Authorizing Provider:  Soniya Horn MD           Collected:           01/06/2022 1656              [place]     [place] [address]      Received:            01/06/2022 [address] [place] [address] Pathologist:           Richard Rodriguez MD                                                                 Specimen:    Endometrium, EMB                                                                           Final Diagnosis 01/06/2022                      Value: This result contains rich text formatting which cannot be displayed here   Note 01/06/2022                      Value: This result contains rich text formatting which cannot be displayed here   Additional Information 01/06/2022                      Value: This result contains rich text formatting which cannot be displayed here  [place] Description 01/06/2022                      Value: This result contains rich text formatting which cannot be displayed here   Clinical Information 01/06/2022                      Value:AUB      Imaging: I have personally reviewed pertinent reports  EKG, Pathology, and Other Studies: I have personally reviewed pertinent reports  Assessment/Plan     A/P: Abnormal uterine bleeding, treatment refractory, EMB insufficient, present for surgical H&P for IUD removal, Dilation and curettage, hysteroscopy and endometrial ablation  Abnormal uterine bleeding  - Patient is reporting abnormal uterine bleeding in the setting of perimenopausal age  She has been consented for Hysteroscopy, endometrial ablation  Prior endometrial biopsy trial at office, EMB resulted as insufficient tissue for exam  We discussed with patient and revised the surgery and consent for IUD removal, Dilation and curettage, hysteroscopy and endometrial ablation    - We  Discussed with patient endometrial ablation is not a contraception method and she may conceive and this can cause pregnancy complications  She agreed to get contraception as Depo provera after procedure  - Surgical consent obtained and Hibiclen body wash was givem        Code Status: Full code     Gene Royal MD  6/2/6405  66:71 AM

## 2022-02-04 ENCOUNTER — PREP FOR PROCEDURE (OUTPATIENT)
Dept: OBGYN CLINIC | Facility: CLINIC | Age: 47
End: 2022-02-04

## 2022-02-04 DIAGNOSIS — N93.9 ABNORMAL UTERINE BLEEDING (AUB): Primary | ICD-10-CM

## 2022-02-15 ENCOUNTER — TELEPHONE (OUTPATIENT)
Dept: INTERNAL MEDICINE CLINIC | Facility: CLINIC | Age: 47
End: 2022-02-15

## 2022-02-15 NOTE — TELEPHONE ENCOUNTER
CALLED PT TO R/S MISSED APPT  PER PT SHE COULD NOT FIND [facility]/REPORTS THAT SHE TRIED CALLING TO LET US KNOW

## 2022-03-07 NOTE — PRE-PROCEDURE INSTRUCTIONS
Pre-Surgery Instructions:   Medication Instructions    aspirin-acetaminophen-caffeine (EXCEDRIN MIGRAINE) 452-341-25 MG per tablet Instructed patient per Anesthesia Guidelines   omeprazole (PriLOSEC) 40 MG capsule Instructed patient per Anesthesia Guidelines  Patient takes the above medications as PRN, if needed she may use morning of surgery

## 2022-03-08 PROBLEM — Z98.890 S/P ENDOMETRIAL ABLATION: Status: ACTIVE | Noted: 2022-03-08

## 2022-03-08 NOTE — DISCHARGE INSTRUCTIONS
Endometrial Ablation   WHAT YOU SHOULD KNOW:   Endometrial ablation (EA) is a procedure to destroy the endometrium (lining of your uterus)  You may need EA if you have heavy or abnormal vaginal bleeding  AFTER YOU LEAVE:   Medicines:   · Medicines  can help decrease pain, calm your stomach, and control vomiting  · Take your medicine as directed  Call your healthcare provider if you think your medicine is not helping or if you have side effects  Tell him if you are allergic to any medicine  Keep a list of the medicines, vitamins, and herbs you take  Include the amounts, and when and why you take them  Bring the list or the pill bottles to follow-up visits  Carry your medicine list with you in case of an emergency  What to expect after your procedure:   · Cramps, similar to menstrual cramps, for 1 to 2 days    · Watery, bloody discharge for 2 to 3 days that may become light and last a few weeks    · Frequent urination for 24 hours    · Nausea  Activity:  Ask when you can return to your normal activities  You may need to avoid sex for 6 weeks after your procedure  Birth control: You may need to use birth control after your procedure to prevent pregnancy  Pregnancy risks, such as a miscarriage, are higher after EA  Talk to your healthcare provider about birth control and having children after EA  Follow up with your healthcare provider as directed:  Write down your questions so you remember to ask them during your visits  Contact your healthcare provider if:   · The bleeding during your monthly periods has not decreased  · You have pain when you urinate  · You have questions or concerns about your condition or care  Seek care immediately or call 911 if:   · You feel lightheaded, short of breath, and have chest pain  · You cough up blood  · Your arm or leg feels warm, tender, and painful  It may look swollen and red      · You have vaginal bleeding and it is not time for your monthly period  · You have a fever  · You feel dizzy, weak, and confused  · You cannot stop vomiting  · You have severe pain  © 2014 [employer] is for End User's use only and may not be sold, redistributed or otherwise used for commercial purposes  All illustrations and images included in CareNotes® are the copyrighted property of A D A M , Inc  or Isiah Nava  The above information is an  only  It is not intended as medical advice for individual conditions or treatments  Talk to your doctor, nurse or pharmacist before following any medical regimen to see if it is safe and effective for you

## 2022-03-09 ENCOUNTER — PREP FOR PROCEDURE (OUTPATIENT)
Dept: OBGYN CLINIC | Facility: CLINIC | Age: 47
End: 2022-03-09

## 2022-03-11 ENCOUNTER — OFFICE VISIT (OUTPATIENT)
Dept: OBGYN CLINIC | Facility: CLINIC | Age: 47
End: 2022-03-11

## 2022-03-11 VITALS
HEART RATE: 81 BPM | HEIGHT: 64 IN | BODY MASS INDEX: 24.04 KG/M2 | SYSTOLIC BLOOD PRESSURE: 114 MMHG | DIASTOLIC BLOOD PRESSURE: 65 MMHG | WEIGHT: 140.8 LBS

## 2022-03-11 DIAGNOSIS — N93.9 ABNORMAL UTERINE BLEEDING: Primary | ICD-10-CM

## 2022-03-11 PROCEDURE — 99212 OFFICE O/P EST SF 10 MIN: CPT | Performed by: OBSTETRICS & GYNECOLOGY

## 2022-03-11 NOTE — H&P (VIEW-ONLY)
H&P Exam - Gynecology   Katherine Patel 40 y o  female MRN: [id number]  Unit/Bed#:  Encounter: [id number]      History of Present Illness     HPI:  Katherine Patel is a 40 y o  female who presents for surgical H&P  Patient has a planned hysteroscopy, D&C, IUD removal and endometrial ablation scheduled for 4/1 due to AUB  Endometrial biopsy inadequate specimen and pap negative - 2021  Denies any changes in her medical history and no prior abdominal surgery  Review of Systems   Constitutional: Negative for chills and fever  Eyes: Negative for visual disturbance  Respiratory: Negative for shortness of breath  Cardiovascular: Negative for chest pain and palpitations  Gastrointestinal: Negative for abdominal pain, nausea and vomiting  Genitourinary: Negative for dysuria, vaginal discharge and vaginal pain  Neurological: Negative for dizziness and light-headedness         Historical Information   Past Medical History:   Diagnosis Date    GERD (gastroesophageal reflux disease)     Migraine     takes excederine migraine     Past Surgical History:   Procedure Laterality Date    DENTAL SURGERY         Family History   Problem Relation Age of Onset    Breast cancer Mother         58 y/o    Terra Quintana BRCA1 Negative Mother     Diabetes Father     Hypertension Father     No Known Problems Brother     No Known Problems Daughter     No Known Problems Son     Breast cancer Maternal Grandmother 46    Lung cancer Maternal Grandmother 59    Alzheimer's disease Maternal Grandfather     Heart disease Paternal Grandmother     No Known Problems Paternal Grandfather     No Known Problems Brother     No Known Problems Brother     No Known Problems Son     Lung cancer Maternal Uncle 48    Throat cancer Cousin 39    BRCA1 Positive Cousin     Kidney cancer Cousin 39     Social History   Social History     Substance and Sexual Activity   Alcohol Use Never    Comment: social     Social History     Substance and Sexual Activity   Drug Use No     Social History     Tobacco Use   Smoking Status Never Smoker   Smokeless Tobacco Never Used       Meds/Allergies   (Not in a hospital admission)    Allergies   Allergen Reactions    Other Swelling     Patient states that she gets hives and swells when she comes in contact with clorox or bleach products  Objective   /65   Pulse 81   Ht 5' 4" (1 626 m)   Wt 63 9 kg (140 lb 12 8 oz)   LMP 02/11/2022   BMI 24 17 kg/m²     [unfilled]    Physical Exam  Exam conducted with a chaperone present  Constitutional:       Appearance: Normal appearance  Cardiovascular:      Rate and Rhythm: Normal rate and regular rhythm  Heart sounds: Normal heart sounds  Pulmonary:      Effort: Pulmonary effort is normal    Abdominal:      Palpations: Abdomen is soft  Tenderness: There is no abdominal tenderness  There is no guarding or rebound  Genitourinary:     General: Normal vulva  Labia:         Right: No rash, tenderness, lesion or injury  Left: No rash, tenderness, lesion or injury  Vagina: No signs of injury  No vaginal discharge, tenderness or bleeding  Cervix: No cervical motion tenderness, friability, lesion or cervical bleeding  Uterus: Not tender  Adnexa: Right adnexa normal and left adnexa normal    Neurological:      Mental Status: She is alert  Lab Results:   No visits with results within 1 Day(s) from this visit     Latest known visit with results is:   Procedure visit on 01/06/2022   Component Date Value    URINE HCG 01/06/2022 negative     Case Report 01/06/2022                      Value:Surgical Pathology Report                         Case: [id number]                                   Authorizing Provider:  Dominik Banks MD           Collected:           01/06/2022 1656              Ordering Location:     [facility]      Received:            01/06/2022 1656                                     [facility]                                                     Pathologist:           Casey Vilchis MD                                                                 Specimen:    Endometrium, EMB                                                                           Final Diagnosis 01/06/2022                      Value: This result contains rich text formatting which cannot be displayed here   Note 01/06/2022                      Value: This result contains rich text formatting which cannot be displayed here   Additional Information 01/06/2022                      Value: This result contains rich text formatting which cannot be displayed here  [facility] Gross Description 01/06/2022                      Value: This result contains rich text formatting which cannot be displayed here   Clinical Information 01/06/2022                      Value:AUB        Assessment/Plan     A/P: Israel Garcia is a 39 yo [id number] with AUB scheduled for hysteroscopic D&C, IUD removal, and endometrial ablation  AUB   - No changes in her medical history  Consents have been signed and surgery scheduled for 4/1  Normal gyn exam today  Discussed expected post-operative course  She will use depo post surgery for pregnancy prevention  Patient is aware that surgical nurse will call her the day before surgery and give instructions  Will see patient 2 weeks post op to discuss results       D/w Dr Arelis Delaney MD  3/11/2022  11:39 AM

## 2022-03-11 NOTE — PROGRESS NOTES
H&P Exam - Gynecology   Tru Padilla 40 y o  female MRN: [id number]  Unit/Bed#:  Encounter: [id number]      History of Present Illness     HPI:  Tru Padilla is a 40 y o  female who presents for surgical H&P  Patient has a planned hysteroscopy, D&C, IUD removal and endometrial ablation scheduled for 4/1 due to AUB  Endometrial biopsy inadequate specimen and pap negative - 2021  Denies any changes in her medical history and no prior abdominal surgery  Review of Systems   Constitutional: Negative for chills and fever  Eyes: Negative for visual disturbance  Respiratory: Negative for shortness of breath  Cardiovascular: Negative for chest pain and palpitations  Gastrointestinal: Negative for abdominal pain, nausea and vomiting  Genitourinary: Negative for dysuria, vaginal discharge and vaginal pain  Neurological: Negative for dizziness and light-headedness         Historical Information   Past Medical History:   Diagnosis Date    GERD (gastroesophageal reflux disease)     Migraine     takes excederine migraine     Past Surgical History:   Procedure Laterality Date    DENTAL SURGERY         Family History   Problem Relation Age of Onset    Breast cancer Mother         60 y/o    [de-identified] BRCA1 Negative Mother     Diabetes Father     Hypertension Father     No Known Problems Brother     No Known Problems Daughter     No Known Problems Son     Breast cancer Maternal Grandmother 46    Lung cancer Maternal Grandmother 59    Alzheimer's disease Maternal Grandfather     Heart disease Paternal Grandmother     No Known Problems Paternal Grandfather     No Known Problems Brother     No Known Problems Brother     No Known Problems Son     Lung cancer Maternal Uncle 48    Throat cancer Cousin 39    BRCA1 Positive Cousin     Kidney cancer Cousin 39     Social History   Social History     Substance and Sexual Activity   Alcohol Use Never    Comment: social     Social History     Substance and Sexual Activity   Drug Use No     Social History     Tobacco Use   Smoking Status Never Smoker   Smokeless Tobacco Never Used       Meds/Allergies   (Not in a hospital admission)    Allergies   Allergen Reactions    Other Swelling     Patient states that she gets hives and swells when she comes in contact with clorox or bleach products  Objective   /65   Pulse 81   Ht 5' 4" (1 626 m)   Wt 63 9 kg (140 lb 12 8 oz)   LMP 02/11/2022   BMI 24 17 kg/m²     [unfilled]    Physical Exam  Exam conducted with a chaperone present  Constitutional:       Appearance: Normal appearance  Cardiovascular:      Rate and Rhythm: Normal rate and regular rhythm  Heart sounds: Normal heart sounds  Pulmonary:      Effort: Pulmonary effort is normal    Abdominal:      Palpations: Abdomen is soft  Tenderness: There is no abdominal tenderness  There is no guarding or rebound  Genitourinary:     General: Normal vulva  Labia:         Right: No rash, tenderness, lesion or injury  Left: No rash, tenderness, lesion or injury  Vagina: No signs of injury  No vaginal discharge, tenderness or bleeding  Cervix: No cervical motion tenderness, friability, lesion or cervical bleeding  Uterus: Not tender  Adnexa: Right adnexa normal and left adnexa normal    Neurological:      Mental Status: She is alert  Lab Results:   No visits with results within 1 Day(s) from this visit     Latest known visit with results is:   Procedure visit on 01/06/2022   Component Date Value    URINE HCG 01/06/2022 negative     Case Report 01/06/2022                      Value:Surgical Pathology Report                         Case: [id number]                                   Authorizing Provider:  Wu Romero MD           Collected:           01/06/2022 1656              Ordering Location:     [place] [id number]      Received:            01/06/2022 [facility]                                     [facility]                                                     Pathologist:           Talha Baig MD                                                                 Specimen:    Endometrium, EMB                                                                           Final Diagnosis 01/06/2022                      Value: This result contains rich text formatting which cannot be displayed here   Note 01/06/2022                      Value: This result contains rich text formatting which cannot be displayed here   Additional Information 01/06/2022                      Value: This result contains rich text formatting which cannot be displayed here  [facility] Gross Description 01/06/2022                      Value: This result contains rich text formatting which cannot be displayed here   Clinical Information 01/06/2022                      Value:AUB        Assessment/Plan     A/P: Richmond Mora is a 41 yo [id number] with AUB scheduled for hysteroscopic D&C, IUD removal, and endometrial ablation  AUB   - No changes in her medical history  Consents have been signed and surgery scheduled for 4/1  Normal gyn exam today  Discussed expected post-operative course  She will use depo post surgery for pregnancy prevention  Patient is aware that surgical nurse will call her the day before surgery and give instructions  Will see patient 2 weeks post op to discuss results       D/w Dr Courtney Glynn MD  3/11/2022  11:39 AM

## 2022-04-01 ENCOUNTER — ANESTHESIA EVENT (OUTPATIENT)
Dept: PERIOP | Facility: HOSPITAL | Age: 47
End: 2022-04-01
Payer: COMMERCIAL

## 2022-04-01 ENCOUNTER — HOSPITAL ENCOUNTER (OUTPATIENT)
Facility: HOSPITAL | Age: 47
Setting detail: OUTPATIENT SURGERY
Discharge: HOME | End: 2022-04-01
Attending: OBSTETRICS & GYNECOLOGY | Admitting: OBSTETRICS & GYNECOLOGY
Payer: COMMERCIAL

## 2022-04-01 ENCOUNTER — ANESTHESIA (OUTPATIENT)
Dept: PERIOP | Facility: HOSPITAL | Age: 47
End: 2022-04-01
Payer: COMMERCIAL

## 2022-04-01 VITALS
HEART RATE: 68 BPM | TEMPERATURE: 96.6 F | DIASTOLIC BLOOD PRESSURE: 54 MMHG | SYSTOLIC BLOOD PRESSURE: 97 MMHG | RESPIRATION RATE: 12 BRPM | WEIGHT: 138 LBS | HEIGHT: 64 IN | OXYGEN SATURATION: 99 % | BODY MASS INDEX: 23.56 KG/M2

## 2022-04-01 DIAGNOSIS — Z98.890 S/P ENDOMETRIAL ABLATION: Primary | ICD-10-CM

## 2022-04-01 DIAGNOSIS — N93.9 ABNORMAL UTERINE BLEEDING (AUB): ICD-10-CM

## 2022-04-01 DIAGNOSIS — G89.18 POST-OP PAIN: ICD-10-CM

## 2022-04-01 LAB
EXT PREGNANCY TEST URINE: NEGATIVE
EXT. CONTROL: NORMAL

## 2022-04-01 PROCEDURE — 81025 URINE PREGNANCY TEST: CPT | Performed by: OBSTETRICS & GYNECOLOGY

## 2022-04-01 PROCEDURE — 58301 REMOVE INTRAUTERINE DEVICE: CPT | Performed by: OBSTETRICS & GYNECOLOGY

## 2022-04-01 PROCEDURE — 58563 HYSTEROSCOPY ABLATION: CPT | Performed by: OBSTETRICS & GYNECOLOGY

## 2022-04-01 PROCEDURE — 88305 TISSUE EXAM BY PATHOLOGIST: CPT | Performed by: PATHOLOGY

## 2022-04-01 RX ORDER — SODIUM CHLORIDE 9 MG/ML
INJECTION, SOLUTION INTRAVENOUS AS NEEDED
Status: DISCONTINUED | OUTPATIENT
Start: 2022-04-01 | End: 2022-04-01 | Stop reason: HOSPADM

## 2022-04-01 RX ORDER — OXYCODONE HYDROCHLORIDE AND ACETAMINOPHEN 5; 325 MG/1; MG/1
1-2 TABLET ORAL EVERY 6 HOURS PRN
Qty: 8 TABLET | Refills: 0 | Status: SHIPPED | OUTPATIENT
Start: 2022-04-01 | End: 2022-04-03

## 2022-04-01 RX ORDER — ACETAMINOPHEN 325 MG/1
650 TABLET ORAL EVERY 6 HOURS PRN
Status: DISCONTINUED | OUTPATIENT
Start: 2022-04-01 | End: 2022-04-01 | Stop reason: HOSPADM

## 2022-04-01 RX ORDER — SODIUM CHLORIDE, SODIUM LACTATE, POTASSIUM CHLORIDE, CALCIUM CHLORIDE 600; 310; 30; 20 MG/100ML; MG/100ML; MG/100ML; MG/100ML
125 INJECTION, SOLUTION INTRAVENOUS CONTINUOUS
Status: DISCONTINUED | OUTPATIENT
Start: 2022-04-01 | End: 2022-04-01 | Stop reason: HOSPADM

## 2022-04-01 RX ORDER — DEXAMETHASONE SODIUM PHOSPHATE 10 MG/ML
INJECTION, SOLUTION INTRAMUSCULAR; INTRAVENOUS AS NEEDED
Status: DISCONTINUED | OUTPATIENT
Start: 2022-04-01 | End: 2022-04-01

## 2022-04-01 RX ORDER — ONDANSETRON 2 MG/ML
INJECTION INTRAMUSCULAR; INTRAVENOUS AS NEEDED
Status: DISCONTINUED | OUTPATIENT
Start: 2022-04-01 | End: 2022-04-01

## 2022-04-01 RX ORDER — ONDANSETRON 2 MG/ML
4 INJECTION INTRAMUSCULAR; INTRAVENOUS ONCE AS NEEDED
Status: DISCONTINUED | OUTPATIENT
Start: 2022-04-01 | End: 2022-04-01 | Stop reason: HOSPADM

## 2022-04-01 RX ORDER — LIDOCAINE HYDROCHLORIDE 10 MG/ML
INJECTION, SOLUTION EPIDURAL; INFILTRATION; INTRACAUDAL; PERINEURAL AS NEEDED
Status: DISCONTINUED | OUTPATIENT
Start: 2022-04-01 | End: 2022-04-01

## 2022-04-01 RX ORDER — LIDOCAINE HYDROCHLORIDE 10 MG/ML
0.5 INJECTION, SOLUTION EPIDURAL; INFILTRATION; INTRACAUDAL; PERINEURAL ONCE AS NEEDED
Status: COMPLETED | OUTPATIENT
Start: 2022-04-01 | End: 2022-04-01

## 2022-04-01 RX ORDER — PROPOFOL 10 MG/ML
INJECTION, EMULSION INTRAVENOUS AS NEEDED
Status: DISCONTINUED | OUTPATIENT
Start: 2022-04-01 | End: 2022-04-01

## 2022-04-01 RX ORDER — FENTANYL CITRATE 50 UG/ML
INJECTION, SOLUTION INTRAMUSCULAR; INTRAVENOUS AS NEEDED
Status: DISCONTINUED | OUTPATIENT
Start: 2022-04-01 | End: 2022-04-01

## 2022-04-01 RX ORDER — IBUPROFEN 400 MG/1
600 TABLET ORAL EVERY 6 HOURS PRN
Status: DISCONTINUED | OUTPATIENT
Start: 2022-04-01 | End: 2022-04-01 | Stop reason: HOSPADM

## 2022-04-01 RX ORDER — MIDAZOLAM HYDROCHLORIDE 2 MG/2ML
INJECTION, SOLUTION INTRAMUSCULAR; INTRAVENOUS AS NEEDED
Status: DISCONTINUED | OUTPATIENT
Start: 2022-04-01 | End: 2022-04-01

## 2022-04-01 RX ORDER — FENTANYL CITRATE/PF 50 MCG/ML
25 SYRINGE (ML) INJECTION
Status: DISCONTINUED | OUTPATIENT
Start: 2022-04-01 | End: 2022-04-01 | Stop reason: HOSPADM

## 2022-04-01 RX ADMIN — SODIUM CHLORIDE, SODIUM LACTATE, POTASSIUM CHLORIDE, AND CALCIUM CHLORIDE: .6; .31; .03; .02 INJECTION, SOLUTION INTRAVENOUS at 13:39

## 2022-04-01 RX ADMIN — ONDANSETRON 4 MG: 2 INJECTION INTRAMUSCULAR; INTRAVENOUS at 13:04

## 2022-04-01 RX ADMIN — MIDAZOLAM 2 MG: 1 INJECTION INTRAMUSCULAR; INTRAVENOUS at 13:03

## 2022-04-01 RX ADMIN — DEXAMETHASONE SODIUM PHOSPHATE 10 MG: 10 INJECTION, SOLUTION INTRAMUSCULAR; INTRAVENOUS at 13:17

## 2022-04-01 RX ADMIN — IBUPROFEN 600 MG: 400 TABLET, FILM COATED ORAL at 15:59

## 2022-04-01 RX ADMIN — FENTANYL CITRATE 25 MCG: 50 INJECTION INTRAMUSCULAR; INTRAVENOUS at 13:47

## 2022-04-01 RX ADMIN — SODIUM CHLORIDE, SODIUM LACTATE, POTASSIUM CHLORIDE, AND CALCIUM CHLORIDE 125 ML/HR: .6; .31; .03; .02 INJECTION, SOLUTION INTRAVENOUS at 12:15

## 2022-04-01 RX ADMIN — LIDOCAINE HYDROCHLORIDE 0.5 ML: 10 INJECTION, SOLUTION EPIDURAL; INFILTRATION; INTRACAUDAL; PERINEURAL at 12:15

## 2022-04-01 RX ADMIN — LIDOCAINE HYDROCHLORIDE 100 MG: 10 INJECTION, SOLUTION EPIDURAL; INFILTRATION; INTRACAUDAL; PERINEURAL at 13:11

## 2022-04-01 RX ADMIN — PROPOFOL 150 MG: 10 INJECTION, EMULSION INTRAVENOUS at 13:12

## 2022-04-01 RX ADMIN — FENTANYL CITRATE 25 MCG: 50 INJECTION INTRAMUSCULAR; INTRAVENOUS at 13:12

## 2022-04-01 RX ADMIN — FENTANYL CITRATE 50 MCG: 50 INJECTION INTRAMUSCULAR; INTRAVENOUS at 14:10

## 2022-04-01 NOTE — OP NOTE
OPERATIVE REPORT  PATIENT NAME: Karrie Campbell    :  1975  MRN: [id number]  Pt Location: [facility] OR ROOM 03    SURGERY DATE: 2022    Surgeon(s) and Role:     * Laila Lopez MD - Primary     * Sandor Helms MD - Assisting    Preop Diagnosis:  Abnormal uterine bleeding (AUB) [N93 9]  Lost IUD strings     Post-Op Diagnosis Codes:   Abnormal uterine bleeding (AUB) [N93 9]  Lost IUD strings     Procedure(s) (LRB):  REMOVAL OF INTRAUTERINE DEVICE (IUD) (N/A)  ABLATION ENDOMETRIAL NOVASURE (N/A)  DILATATION AND CURETTAGE (D&C) WITH HYSTEROSCOPY (N/A)    Specimen(s):  ID Type Source Tests Collected by Time Destination   1 : [facility] Tissue Endometrium TISSUE EXAM Laila Lopez MD 2022 1327    2 :  Tissue Polyp, Cervical/Endometrial TISSUE EXAM Laila Lopez MD 2022 1348        Estimated Blood Loss:   Minimal    Drains:  * No LDAs found *    Anesthesia Type:   General, LMA     Operative Indications:  Abnormal uterine bleeding (AUB) [N93 9]  Lost IUD strings     Operative Findings:  1  Normal appearing external genitalia with no ulcerations or lesions   2  Vagina with cystocele, small rectocele and 1st degree vaginal prolapse   3  Parous appearing cervix that was grossly normal in appearing with no lacerations, lesions or discharge   4  Hysteroscopic examination was limited secondary to ample blood in uterine cavity, uterine fibroid visualized emerging from the left uterine wall at approximately 2-3 o'clock, IUD strings were visualized with hysteroscope upon insertion of the scope, bilateral ostia were unable to be visualized   5  Hemostatic tenaculum site    Complications:   None apparent     Procedure and Technique:  Patient was identified in the preop holding area as well as the operating suite  Procedure was reviewed and patient consented verbally once again  She underwent successful induction of general anesthesia using LMA    She was placed in the dorsal lithotomy position using yellowfin eri  She had pneumatic compression boots in place  She had a Betadine vaginal prep and was draped in sterile fashion  A operative timeout was accomplished  Exam under anesthesia revealed no vaginal or cervical lesions  There was noted to be a cystocele and small rectocele  First degree vaginal prolapse was noted with good descensus making this patient a good candidate for vaginal approach should hysterectomy be needed in the future  The cervix was parous  Uterus was anteverted, mobile and normal in size  There were no adnexal masses noted  The anterior lip of the cervix was grasped using a double-tooth tenaculum  Uterus sounded in midposition fashion to 10 cm  The endocervix was dilated to 21F using hinojosa dilatiors to accommodate the hysteroscope  The hysteroscope was then introduced with saline as a distention medium  The weighted speculum was removed to aid in scope mobilization  There was signficant difficulty noted upon entry into the uterine cavity secondary to blood  The fundus of the uterus was able to be visualized and there was noted to be a fibroid emerging from the left wall of the uterine cavity at approximately 2-3 o'clock  The bilateral ostia were unable to be visualized  The IUD strings were noted in the cervical canal  Fluid deficit reached 300 mL  The hysteroscope was removed, weighted speculum was placed back in the vagina and poylp forceps were inserted into the cervical canal  The strings were grasped and the IUD was removed and noted to be intact  Poylp forceps were reintroduced and several passes were taken and small pieces of the fibroid were excised  Given the scant amount of tissue removed, the decision was made to proceed with the curettage at this time  A medium sharp curette was introduced into the cervix and endometrial curettage was then undertaken with specimen being sent for routine pathology       The NovaSure ablation device was inserted through the cervix and seated into the endometrial cavity  Device was deployed and rotated in all directions to maximize expansion of the bipolar electrode  Uterine length was determined to be 6 cm and uterine width was determined to be 3 5 cm  A test of the system was performed and the uterine cavity was insufflated with CO2 to ensure cavity integrity and proper placement of the device  No leakage of gas was appreciated  After successful testing, the Novasure system was activated and bipolar cauterization with a Moisture Transport Vacuum System facilitated ablation of the endometrium in approximately 47 seconds under 116 price of power  The Novasure system was completely retracted prior to it's removal from the uterine cavity  Inspection of the bipolar electrode showed evidence of burnt endometrial tissue  The tenaculum was removed  The patient had excellent hemostasis at this time  She was cleansed and was awakened from anesthesia without incident and was taken to the recovery room in satisfactory condition  Dr Dylon Germain was present and participated in the entire surgery      Patient Disposition:  PACU       SIGNATURE: Lorie Caruso MD  DATE: April 1, 2022  TIME: 2:15 PM

## 2022-04-01 NOTE — INTERVAL H&P NOTE
H&P reviewed  After examining the patient I find no changes in the patients condition since the H&P had been written  Post op instr given, Percocet 4 then Advil prn pain, RTO 2 wks      Vitals:    04/01/22 1147   BP: 99/55   Pulse: 70   Resp: 18   Temp: 97 9 °F (36 6 °C)   SpO2: 97%

## 2022-04-01 NOTE — ANESTHESIA POSTPROCEDURE EVALUATION
Post-Op Assessment Note    CV Status:  Stable  Pain Score: 0    Pain management: adequate     Mental Status:  Sleepy   Hydration Status:  Stable   PONV Controlled:  None   Airway Patency:  Patent      Post Op Vitals Reviewed: Yes      Staff: CRNA         No complications documented      /53 (04/01/22 1417)    Temp 98 9 °F (37 2 °C) (04/01/22 1417)    Pulse 72 (04/01/22 1417)   Resp 20 (04/01/22 1417)    SpO2 99 % (04/01/22 1417)

## 2022-04-15 ENCOUNTER — OFFICE VISIT (OUTPATIENT)
Dept: OBGYN CLINIC | Facility: CLINIC | Age: 47
End: 2022-04-15

## 2022-04-15 VITALS
SYSTOLIC BLOOD PRESSURE: 108 MMHG | BODY MASS INDEX: 24.75 KG/M2 | HEART RATE: 85 BPM | WEIGHT: 145 LBS | HEIGHT: 64 IN | DIASTOLIC BLOOD PRESSURE: 70 MMHG

## 2022-04-15 DIAGNOSIS — Z30.013 ENCOUNTER FOR INITIAL PRESCRIPTION OF INJECTABLE CONTRACEPTIVE: Primary | ICD-10-CM

## 2022-04-15 PROCEDURE — 99213 OFFICE O/P EST LOW 20 MIN: CPT | Performed by: OBSTETRICS & GYNECOLOGY

## 2022-04-15 RX ORDER — MEDROXYPROGESTERONE ACETATE 150 MG/ML
150 INJECTION, SUSPENSION INTRAMUSCULAR
Qty: 1 ML | Refills: 12 | Status: SHIPPED | OUTPATIENT
Start: 2022-04-15 | End: 2022-05-13 | Stop reason: ALTCHOICE

## 2022-04-15 RX ORDER — MEDROXYPROGESTERONE ACETATE 150 MG/ML
150 INJECTION, SUSPENSION INTRAMUSCULAR
Qty: 1 ML | Refills: 2 | Status: SHIPPED | OUTPATIENT
Start: 2022-04-15

## 2022-04-15 NOTE — PROGRESS NOTES
Opal Michelle is a 40 y o  y o  female who presents 2 weeks status post D&C, endometrial ablation and IUD removal for abnormal uterine bleeding  Operative findings again reviewed  OR photos reviewed  Pathology report discussed  Plan    1  Contraception: Depo-Provera ordered, patient will return today for an injection   2  Activity restrictions: none  3  Anticipated return to work: now  4  Follow up: PRN     Subjective    Maria Alejandra Lemus is a 40 y o  y o  female who presents 2 weeks status post D&C, endometrial ablation and IUD removal for abnormal uterine bleeding  Eating a regular diet without difficulty  Bowel movements are normal  The patient is not having any pain  The patient has had some light bleeding, which has transitioned to dark brown  Operative findings, photos and pathology discussed with patient  Discussed contraception and the patient would like to start Depo-Provera       Bee venom and Other    Current Outpatient Medications:     omeprazole (PriLOSEC) 40 MG capsule, Take 1 capsule (40 mg total) by mouth daily, Disp: 30 capsule, Rfl: 5    aspirin-acetaminophen-caffeine (EXCEDRIN MIGRAINE) 250-250-65 MG per tablet, Take 1 tablet by mouth every 6 (six) hours as needed for headaches (Patient not taking: Reported on 3/11/2022 ), Disp: , Rfl:     diphenhydrAMINE (BENADRYL) 50 mg capsule, Take 1 capsule (50 mg total) by mouth every 6 (six) hours as needed for itching or allergies (Patient not taking: Reported on 1/6/2022 ), Disp: 12 capsule, Rfl: 0    EPINEPHrine (EPIPEN) 0 3 mg/0 3 mL SOAJ, Inject 0 3 mL (0 3 mg total) into a muscle once as needed for anaphylaxis for up to 1 dose, Disp: 0 6 mL, Rfl: 2    levonorgestrel (MIRENA) 20 MCG/24HR IUD, 1 each by Intrauterine route once, Disp: , Rfl:     ondansetron (ZOFRAN-ODT) 4 mg disintegrating tablet, Take 1 tablet (4 mg total) by mouth every 6 (six) hours as needed for nausea or vomiting (Patient not taking: Reported on 1/6/2022 ), Disp: 20 tablet, Rfl: 0    triamcinolone (KENALOG) 0 1 % ointment, Apply topically 2 (two) times a day To right forearm rash (Patient not taking: Reported on 9/8/2021), Disp: 30 g, Rfl: 0      Review of Systems  Denies Fevers/chills/N/V/Constipation/Vaginal bleeding/excessive pain/dysuria/frequency of urination  Also as noted in HPI        Objective   /70 (BP Location: Right arm, Patient Position: Sitting, Cuff Size: Adult)   Pulse 85   Ht 5' 4" (1 626 m)   Wt 65 8 kg (145 lb)   LMP  (LMP Unknown)   Breastfeeding No   BMI 24 89 kg/m²     General: alert and oriented, in no acute distress  Abdomen:soft, bowel sounds active, non-tender  Speculum Exam: light brown discharge, no abnormalities

## 2022-04-18 ENCOUNTER — CLINICAL SUPPORT (OUTPATIENT)
Dept: OBGYN CLINIC | Facility: CLINIC | Age: 47
End: 2022-04-18

## 2022-04-18 DIAGNOSIS — Z30.42 ENCOUNTER FOR MANAGEMENT AND INJECTION OF DEPO-PROVERA: Primary | ICD-10-CM

## 2022-04-18 LAB — SL AMB POCT URINE HCG: NEGATIVE

## 2022-04-18 PROCEDURE — 96372 THER/PROPH/DIAG INJ SC/IM: CPT

## 2022-04-18 PROCEDURE — 81025 URINE PREGNANCY TEST: CPT

## 2022-04-18 RX ORDER — MEDROXYPROGESTERONE ACETATE 150 MG/ML
150 INJECTION, SUSPENSION INTRAMUSCULAR
Status: SHIPPED | OUTPATIENT
Start: 2022-04-18

## 2022-04-18 RX ADMIN — MEDROXYPROGESTERONE ACETATE 150 MG: 150 INJECTION, SUSPENSION INTRAMUSCULAR at 09:22

## 2022-04-18 NOTE — PROGRESS NOTES
Depo-Provera      [x]   Patient provided box / Yes  o (23) Refills remain  o Refills submitted: No   Last  Annual Date / Needs Annual scheduled   Last Depo date: 1st Today   Side effects:None reported   HCG: Yes / Negative   Given by: K Reyes   Site: [facility]    o Calcium supplement daily teaching  o Condoms for 2 weeks following first injection dose

## 2022-05-13 ENCOUNTER — OFFICE VISIT (OUTPATIENT)
Dept: INTERNAL MEDICINE CLINIC | Facility: CLINIC | Age: 47
End: 2022-05-13

## 2022-05-13 VITALS
TEMPERATURE: 98.1 F | BODY MASS INDEX: 24.59 KG/M2 | HEART RATE: 86 BPM | HEIGHT: 64 IN | DIASTOLIC BLOOD PRESSURE: 77 MMHG | WEIGHT: 144 LBS | SYSTOLIC BLOOD PRESSURE: 129 MMHG

## 2022-05-13 DIAGNOSIS — Z00.00 ANNUAL PHYSICAL EXAM: Primary | ICD-10-CM

## 2022-05-13 PROCEDURE — 99396 PREV VISIT EST AGE 40-64: CPT | Performed by: INTERNAL MEDICINE

## 2022-05-13 NOTE — PROGRESS NOTES
ADULT ANNUAL PHYSICAL  [address] [facility]    NAME: Author Pritchard  AGE: 40 y o  SEX: female  : 1975     DATE: 2022     Assessment and Plan:     # Annual Physical Examination:  A very pleasant 43-year-old female presenting for annual physical examination  Patient largely asymptomatic during today's visit  Vital signs stable with blood pressure of 129/77  Weight stable at 144 lb, BMI 24 72  Patient up-to-date on screening blood work for cholesterol and diabetes  Will order hepatitis C screening with next set of blood work  Up-to-date on cervical and colon cancer screening and is scheduled for mammogram next week  Up-to-date on vaccinations  Patient should be seen and evaluated in clinic in 1 year for next annual physical examination  Problem List Items Addressed This Visit    None     Visit Diagnoses     Annual physical exam    -  Primary    Screening for colorectal cancer              Immunizations and preventive care screenings were discussed with patient today  Appropriate education was printed on patient's after visit summary  Counseling:  Alcohol/drug use: discussed moderation in alcohol intake, the recommendations for healthy alcohol use, and avoidance of illicit drug use  Dental Health: discussed importance of regular tooth brushing, flossing, and dental visits  Injury prevention: discussed safety/seat belts, safety helmets, smoke detectors, carbon dioxide detectors, and smoking near bedding or upholstery  Sexual health: discussed sexually transmitted diseases, partner selection, use of condoms, avoidance of unintended pregnancy, and contraceptive alternatives  · Exercise: the importance of regular exercise/physical activity was discussed  Recommend exercise 3-5 times per week for at least 30 minutes  Depression Screening and Follow-up Plan: Patient was screened for depression during today's encounter   They screened negative with a PHQ-2 score of 0  No follow-ups on file  Chief Complaint:     Chief Complaint   Patient presents with    Annual Exam     Hand numbness at night with right arm swelling- for 3 weeks        History of Present Illness:     Adult Annual Physical   Ms Thomas  is a 36-year-old female with past medical history of GERD, migraines, and abnormal uterine bleeding presents to the office today for here for a comprehensive physical exam   Only complaint today is that of right-sided elbow swelling that occurs at night  Patient states that each night she developed a lump on her medial epicondyle area  No associated pain and lump improves with massaging the area  Only additional associated symptoms includes that of right hand numbness  Numbness does not follow a specific nerve distribution and involves both the dorsal and palmar aspect of the hand  No forearm involvement  Patient denies any associated pain  The symptoms have been ongoing for last month and only occur at night  No pain or numbness throughout the day  Patient denies any repetitive hand or forearm movements throughout the day  No additional symptoms at this time  Diet and Physical Activity  · Diet/Nutrition: well balanced diet  · Exercise: no formal exercise  Depression Screening  PHQ-2/9 Depression Screening    Little interest or pleasure in doing things: 0 - not at all  Feeling down, depressed, or hopeless: 0 - not at all  PHQ-2 Score: 0  PHQ-2 Interpretation: Negative depression screen       General Health  · Sleep: gets more than 8 hours of sleep on average  · Hearing: normal - bilateral   · Vision: wears glasses  · Dental: regular dental visits and brushes teeth twice daily  /GYN Health  · Patient is: premenopausal  · Last menstrual period: 5/2/2022  · Contraceptive method: injectable contraception       Review of Systems:     Review of Systems   Constitutional: Negative for activity change, chills, diaphoresis, fatigue and unexpected weight change  Eyes: Negative for visual disturbance  Respiratory: Negative for cough, shortness of breath and wheezing  Cardiovascular: Negative for chest pain, palpitations and leg swelling  Gastrointestinal: Negative for abdominal distention, constipation, diarrhea, nausea and vomiting  Endocrine: Negative for polydipsia, polyphagia and polyuria  Genitourinary: Negative for difficulty urinating and dysuria  Musculoskeletal: Negative for arthralgias, back pain and gait problem  Skin: Negative for color change and pallor  Neurological: Positive for numbness (Right hand numbness at night)  Negative for dizziness, weakness, light-headedness and headaches  Psychiatric/Behavioral: Negative for agitation and confusion  Past Medical History:     Past Medical History:   Diagnosis Date    GERD (gastroesophageal reflux disease)     Migraine     takes excederine migraine      Past Surgical History:     Past Surgical History:   Procedure Laterality Date    DENTAL SURGERY      ENDOMETRIAL ABLATION N/A 4/1/2022    Procedure: ABLATION ENDOMETRIAL Earley Castleman;  Surgeon: Ladonna Cervantes MD;  [facility]: [facility];  Service: Gynecology    NH HYSTEROSCOPY,W/ENDO BX N/A 4/1/2022    Procedure: DILATATION AND CURETTAGE (D&C) WITH HYSTEROSCOPY;  Surgeon: Ladonna Cervantes MD;  [facility];  Service: Gynecology    NH REMOVE INTRAUTERINE DEVICE N/A 4/1/2022    Procedure: REMOVAL OF INTRAUTERINE DEVICE (IUD);   Surgeon: Ladonna Cervantes MD;  Location[facility];  Service: Gynecology      Social History:     Social History     Socioeconomic History    Marital status: /Civil Union     Spouse name: Ale Naylor Number of children: 2    Years of education: None    Highest education level: None   Occupational History    Occupation:    Tobacco Use    Smoking status: Never Smoker    Smokeless tobacco: Never Used   Vaping Use    Vaping Use: Never used   Substance and Sexual Activity    Alcohol use: Never     Comment: social    Drug use: No    Sexual activity: Yes     Partners: Male     Birth control/protection: I U D     Other Topics Concern    None   Social History Narrative    None     Social Determinants of Health     Financial Resource Strain: Not on file   Food Insecurity: Not on file   Transportation Needs: Not on file   Physical Activity: Not on file   Stress: Not on file   Social Connections: Not on file   Intimate Partner Violence: Not on file   Housing Stability: Not on file      Family History:     Family History   Problem Relation Age of Onset    Breast cancer Mother         60 y/o     BRCA1 Negative Mother     Diabetes Father     Hypertension Father     No Known Problems Brother     No Known Problems Daughter     No Known Problems Son     Breast cancer Maternal Grandmother 46    Lung cancer Maternal Grandmother 59    Alzheimer's disease Maternal Grandfather     Heart disease Paternal Grandmother     No Known Problems Paternal Grandfather     No Known Problems Brother     No Known Problems Brother     No Known Problems Son     Lung cancer Maternal Uncle 48    Throat cancer Cousin 39    BRCA1 Positive Cousin     Kidney cancer Cousin 41      Current Medications:     Current Outpatient Medications   Medication Sig Dispense Refill    aspirin-acetaminophen-caffeine (EXCEDRIN MIGRAINE) 250-250-65 MG per tablet Take 1 tablet by mouth every 6 (six) hours as needed for headaches      EPINEPHrine (EPIPEN) 0 3 mg/0 3 mL SOAJ Inject 0 3 mL (0 3 mg total) into a muscle once as needed for anaphylaxis for up to 1 dose 0 6 mL 2    medroxyPROGESTERone (DEPO-PROVERA) 150 mg/mL injection Inject 1 mL (150 mg total) into a muscle every 3 (three) months 1 mL 2    omeprazole (PriLOSEC) 40 MG capsule Take 1 capsule (40 mg total) by mouth daily 30 capsule 5    diphenhydrAMINE (BENADRYL) 50 mg capsule Take 1 capsule (50 mg total) by mouth every 6 (six) hours as needed for itching or allergies (Patient not taking: Reported on 1/6/2022 ) 12 capsule 0    levonorgestrel (MIRENA) 20 MCG/24HR IUD 1 each by Intrauterine route once (Patient not taking: Reported on 5/13/2022)      medroxyPROGESTERone (DEPO-PROVERA) 150 mg/mL injection Inject 1 mL (150 mg total) into a muscle every 3 (three) months (Patient not taking: Reported on 5/13/2022) 1 mL 12    ondansetron (ZOFRAN-ODT) 4 mg disintegrating tablet Take 1 tablet (4 mg total) by mouth every 6 (six) hours as needed for nausea or vomiting (Patient not taking: Reported on 1/6/2022 ) 20 tablet 0    triamcinolone (KENALOG) 0 1 % ointment Apply topically 2 (two) times a day To right forearm rash (Patient not taking: Reported on 9/8/2021) 30 g 0     Current Facility-Administered Medications   Medication Dose Route Frequency Provider Last Rate Last Admin    medroxyPROGESTERone (DEPO-PROVERA) IM injection 150 mg  150 mg Intramuscular Q3 Months CatieLULU Espinosa   150 mg at 04/18/22 6037      Allergies: Allergies   Allergen Reactions    Bee Venom Anaphylaxis    Other Swelling     Patient states that she gets hives and swells when she comes in contact with clorox or bleach products  Physical Exam:     /77 (BP Location: Right arm, Patient Position: Sitting, Cuff Size: Large)   Pulse 86   Temp 98 1 °F (36 7 °C) (Temporal)   Ht 5' 4" (1 626 m)   Wt 65 3 kg (144 lb)   BMI 24 72 kg/m²     Physical Exam  Constitutional:       General: She is not in acute distress  Appearance: Normal appearance  She is normal weight  She is not ill-appearing or toxic-appearing  HENT:      Head: Normocephalic  Nose: Nose normal  No congestion  Mouth/Throat:      Mouth: Mucous membranes are moist       Pharynx: Oropharynx is clear  Eyes:      General: No scleral icterus       Conjunctiva/sclera: Conjunctivae normal    Cardiovascular:      Rate and Rhythm: Normal rate and regular rhythm  Pulses: Normal pulses  Heart sounds: Normal heart sounds  No murmur heard  Pulmonary:      Effort: Pulmonary effort is normal  No respiratory distress  Breath sounds: Normal breath sounds  No wheezing, rhonchi or rales  Abdominal:      General: Abdomen is flat  Bowel sounds are normal  There is no distension  Palpations: Abdomen is soft  Tenderness: There is no abdominal tenderness  There is no guarding  Hernia: No hernia is present  Musculoskeletal:         General: Normal range of motion  Cervical back: Normal range of motion  Right lower leg: No edema  Left lower leg: No edema  Skin:     General: Skin is warm  Capillary Refill: Capillary refill takes less than 2 seconds  Coloration: Skin is not pale  Neurological:      Mental Status: She is alert and oriented to person, place, and time  Mental status is at baseline  Sensory: No sensory deficit  Motor: No weakness        Gait: Gait normal       Comments: Negative Tinel's and phalen's sign   Psychiatric:         Mood and Affect: Mood normal          Behavior: Behavior normal           Kimmie Deluca DO  [address]

## 2022-05-13 NOTE — PATIENT INSTRUCTIONS

## 2022-05-17 ENCOUNTER — HOSPITAL ENCOUNTER (OUTPATIENT)
Dept: MAMMOGRAPHY | Facility: CLINIC | Age: 47
Discharge: HOME | End: 2022-05-17
Payer: COMMERCIAL

## 2022-05-17 ENCOUNTER — HOSPITAL ENCOUNTER (OUTPATIENT)
Dept: ULTRASOUND IMAGING | Facility: CLINIC | Age: 47
Discharge: HOME | End: 2022-05-17
Payer: COMMERCIAL

## 2022-05-17 VITALS — HEIGHT: 64 IN | BODY MASS INDEX: 24.59 KG/M2 | WEIGHT: 144 LBS

## 2022-05-17 DIAGNOSIS — N64.4 BREAST PAIN, LEFT: ICD-10-CM

## 2022-05-17 PROCEDURE — G0279 TOMOSYNTHESIS, MAMMO: HCPCS

## 2022-05-17 PROCEDURE — 76642 ULTRASOUND BREAST LIMITED: CPT

## 2022-05-17 PROCEDURE — 77066 DX MAMMO INCL CAD BI: CPT

## 2022-05-18 ENCOUNTER — OFFICE VISIT (OUTPATIENT)
Dept: OBGYN CLINIC | Facility: CLINIC | Age: 47
End: 2022-05-18

## 2022-05-18 VITALS
SYSTOLIC BLOOD PRESSURE: 98 MMHG | WEIGHT: 144 LBS | BODY MASS INDEX: 24.59 KG/M2 | HEART RATE: 110 BPM | HEIGHT: 64 IN | RESPIRATION RATE: 18 BRPM | DIASTOLIC BLOOD PRESSURE: 61 MMHG

## 2022-05-18 DIAGNOSIS — N93.9 VAGINAL BLEEDING: Primary | ICD-10-CM

## 2022-05-18 PROCEDURE — 99213 OFFICE O/P EST LOW 20 MIN: CPT | Performed by: OBSTETRICS & GYNECOLOGY

## 2022-05-18 NOTE — PROGRESS NOTES
Subjective: Misa Lanier is a 40 y o  [id number] female who presents with spotting for the last 2 weeks  This is the same bleeding pattern she had prior to her endometrial ablation in April  She is frustrated that she is still having the same problem  She is also experiencing cramping with the period but reports that this is tolerable without medications  She received her Depo shot at her 2 week post op appt in April  Objective:    Vitals: Blood pressure 98/61, pulse (!) 110, resp  rate 18, height 5' 4" (1 626 m), weight 65 3 kg (144 lb), not currently breastfeeding  Body mass index is 24 72 kg/m²  Physical Exam  Vitals reviewed  Constitutional:       Appearance: Normal appearance  Genitourinary:     Labia:         Right: No rash, tenderness or lesion  Left: No rash, tenderness or lesion  Vagina: No vaginal discharge or tenderness  Cervix: No friability, lesion or erythema  Comments: Minimal blood in vaginal vault  Neurological:      Mental Status: She is alert  Assessment/Plan:    Vaginal bleeding  Discussed with patient that it is not uncommon to still have bleeding after endometrial ablation  Discussed with patient that it could take up to 3 months for us to determine if she will have lightened periods or become amenorrheic after a endometrial ablation  Discussed with pt that approximately 40% of patients are amenorrheic at 12 months post ablation  It is also possible that the endometrial ablation will fail and she will continue to have the same bleeding pattern that she had prior to the procedure being performed  Did discuss briefly with patient that if that is the case the next step would likely be hysterectomy  She has previously failed medical management  Patient is willing to continue to monitor bleeding pattern over the next 3-6 months and return to the office to discuss options at that point          Ashwin Nuñez MD  5/18/2022  2:33 PM

## 2022-05-31 ENCOUNTER — OFFICE VISIT (OUTPATIENT)
Dept: OBGYN CLINIC | Facility: CLINIC | Age: 47
End: 2022-05-31

## 2022-05-31 VITALS
HEART RATE: 101 BPM | HEIGHT: 64 IN | SYSTOLIC BLOOD PRESSURE: 129 MMHG | DIASTOLIC BLOOD PRESSURE: 76 MMHG | WEIGHT: 143 LBS | BODY MASS INDEX: 24.41 KG/M2

## 2022-05-31 DIAGNOSIS — Z98.890 S/P ENDOMETRIAL ABLATION: ICD-10-CM

## 2022-05-31 DIAGNOSIS — N93.9 ABNORMAL UTERINE BLEEDING: Primary | ICD-10-CM

## 2022-05-31 PROCEDURE — 99213 OFFICE O/P EST LOW 20 MIN: CPT | Performed by: OBSTETRICS & GYNECOLOGY

## 2022-05-31 NOTE — PROGRESS NOTES
Subjective: Dylan Posada is a 40 y o  [id number] female who presents to discuss continued bleeding and cramping after endometrial ablation  Patient had D&C hysteroscopy with IUD removal and Novasure Ablation on 4/1/22  She was seen by Dr Erika Dupree on 5/18/22 due to continue vaginal bleeding  Dr Erika Dupree reviewed with patient the expectations regarding bleeding after ablation and patient was counseled to continue expectant management  At this time, patient states that she is very frustrated by the continued bleeding  She reports daily dark bleeding  She has been unable to have intercourse  She reports continue cramping on the left side (mid abdomen) wrapping to her back  She has been taking Tylenol PRN (she is unable to take Motrin due to prior history of gastric ulcers)  She continues to express her frustration  Patient Active Problem List   Diagnosis    Screening for thyroid disorder    Abnormal uterine bleeding    Women's annual routine gynecological examination    Abnormal mammogram    Bartholin gland cyst    Anaphylaxis    GERD (gastroesophageal reflux disease)    Migraines    Left hip pain    S/P endometrial ablation     Past Medical History:   Diagnosis Date    GERD (gastroesophageal reflux disease)     Migraine     takes excederine migraine       Objective:    Vitals: Blood pressure 129/76, pulse 101, height 5' 4" (1 626 m), weight 64 9 kg (143 lb), not currently breastfeeding  Body mass index is 24 55 kg/m²  Physical Exam  Vitals reviewed  Exam conducted with a chaperone present  Constitutional:       General: She is not in acute distress  Appearance: She is well-developed  She is not ill-appearing, toxic-appearing or diaphoretic  Cardiovascular:      Rate and Rhythm: Normal rate  Pulmonary:      Effort: Pulmonary effort is normal    Abdominal:      Palpations: Abdomen is soft  Genitourinary:     General: Normal vulva  Exam position: Lithotomy position        Labia: Right: No rash, tenderness, lesion or injury  Left: No rash, tenderness, lesion or injury  Vagina: No signs of injury and foreign body  No vaginal discharge, erythema, tenderness, bleeding, lesions or prolapsed vaginal walls  Cervix: Cervical bleeding (Very minimal light orange discharge (mucousy) from cervical os) present  No cervical motion tenderness, discharge, friability, lesion, erythema or eversion  Uterus: Not deviated, not enlarged, not fixed, not tender and no uterine prolapse  Adnexa:         Right: No mass, tenderness or fullness  Left: No mass, tenderness or fullness  Skin:     General: Skin is warm and dry  Neurological:      Mental Status: She is alert and oriented to person, place, and time  Psychiatric:         Behavior: Behavior normal          Assessment/Plan:    Problem List Items Addressed This Visit        Unprioritized    Abnormal uterine bleeding - Primary    Relevant Medications    norethindrone (Aygestin) 5 mg tablet    Other Relevant Orders    US pelvis complete w transvaginal    S/P endometrial ablation     We again reviewed the expectations regarding recovery from an ablation  Patient encouraged that her bleeding is currently very light  Given her continue frustration despite depo administration, patient was offered Aygestin taper  We discussed that this may not solve the bleeding as this still appears physiologically appropriate for the procedure performed  She is willing to try the taper  Additionally, we reviewed that the level of her cramping is not consistent with cramping due to her uterus, however, given continued pain with bleeding, ultrasound has been ordered to assess for hematometra  We also discussed that if patient continues to have pain and bleeding, her next option would be hysterectomy - which she currently states she would be find with   Patient encouraged to continue to give time to the ablation procedure and in the meantime, we will attempt to stop and bleeding and ensure that all is normal as expected  All questions answered at this time and patient reports that her frustration level has decreased                    Kavya Harvey MD  5/31/2022  8:17 PM

## 2022-06-01 NOTE — ASSESSMENT & PLAN NOTE
We again reviewed the expectations regarding recovery from an ablation  Patient encouraged that her bleeding is currently very light  Given her continue frustration despite depo administration, patient was offered Aygestin taper  We discussed that this may not solve the bleeding as this still appears physiologically appropriate for the procedure performed  She is willing to try the taper  Additionally, we reviewed that the level of her cramping is not consistent with cramping due to her uterus, however, given continued pain with bleeding, ultrasound has been ordered to assess for hematometra  We also discussed that if patient continues to have pain and bleeding, her next option would be hysterectomy - which she currently states she would be find with  Patient encouraged to continue to give time to the ablation procedure and in the meantime, we will attempt to stop and bleeding and ensure that all is normal as expected  All questions answered at this time and patient reports that her frustration level has decreased

## 2022-06-01 NOTE — ASSESSMENT & PLAN NOTE
We again reviewed the expectations regarding recovery from an ablation  Patient encouraged that her bleeding is currently very light  Given her continue frustration despite depo administration, patient was offered Aygestin taper  We discussed that this may not solve the bleeding as this still appears physiologically appropriate for the procedure performed  She is willing to try the taper  Additionally, we reviewed that the level of her cramping is not consistent with cramping due to her uterus, however, given continued pain with bleeding, ultrasound has been ordered to assess for hematometra  All questions answered at this time and patient reports that her frustration level has decreased

## 2022-06-06 ENCOUNTER — HOSPITAL ENCOUNTER (OUTPATIENT)
Dept: RADIOLOGY | Facility: HOSPITAL | Age: 47
Discharge: HOME | End: 2022-06-06
Payer: COMMERCIAL

## 2022-06-06 DIAGNOSIS — N93.9 ABNORMAL UTERINE BLEEDING: ICD-10-CM

## 2022-06-06 PROCEDURE — 76856 US EXAM PELVIC COMPLETE: CPT

## 2022-06-06 PROCEDURE — 76830 TRANSVAGINAL US NON-OB: CPT

## 2022-06-29 NOTE — PROGRESS NOTES
OB/GYN VISIT  Calli Sanz  6/30/2022  11:47 AM      Assessment/Plan:    Problem List Items Addressed This Visit        Genitourinary    Abnormal uterine bleeding - Primary       Other    S/P endometrial ablation     Current differential of continued bleeding is withdrawal bleeding following completion of Aygestin vs breakthrough bleeding secondary to failed endometrial ablation  Patient's bleeding is decreased compared to normal menstrual cycle, but overall her goals of amenorrhea have not yet been achieved and she is >8 weeks from ablation  Pelvic ultrasound WNL  Patient expressed desire for hysterectomy if meets criteria for failed ablation  She will continue with Depo Provera for contraception, next injection scheduled for 7/5/22  Discussed with patient if she has no further bleeding and current bleeding was secondary to Aygestin withdrawal, plan to RTC for annual exam  However, if period continues, she will call to schedule follow-up for discussion regarding hysterectomy  All questions answered at this time and patient agreeable to plan  D/w Dr Luis Shen    Subjective: Calli Sanz is a 40 y o  [id number] female who presents to discuss continued bleeding and cramping after endometrial ablation  Patient had D&C hysteroscopy with IUD removal and Novasure Ablation on 4/1/22  She saw Dr Nafisa Valenzuela on 5/31/22 and was started on Aygestin taper  Today, patient reports she has vaginal bleeding "like a period" that started today  She did stop bleeding with Aygestin taper, which she completed about 12 days ago  Reports current bleeding is a mix of dark red blood and brown discharge  Currently using two pads per day  States her bleeding has been lighter, but is lasting longer  She expresses frustration with continued bleeding as she hoped ablation would provide her goal of amenorrhea        Objective:    Vitals: Blood pressure 100/57, pulse 73, height 5' 4" (1 626 m), weight 65 3 kg (144 lb), last menstrual period 06/28/2022, not currently breastfeeding  Body mass index is 24 72 kg/m²  Physical Exam  Vitals reviewed  Constitutional:       General: She is not in acute distress  Appearance: Normal appearance  She is well-developed and normal weight  She is not ill-appearing, toxic-appearing or diaphoretic  HENT:      Head: Normocephalic and atraumatic  Eyes:      General: No scleral icterus  Conjunctiva/sclera: Conjunctivae normal    Cardiovascular:      Rate and Rhythm: Normal rate  Pulmonary:      Effort: Pulmonary effort is normal  No respiratory distress  Abdominal:      Palpations: Abdomen is soft  Tenderness: There is no abdominal tenderness  Neurological:      General: No focal deficit present  Mental Status: She is alert and oriented to person, place, and time  Psychiatric:         Mood and Affect: Mood normal          Behavior: Behavior normal            PELVIC ULTRASOUND, COMPLETE     INDICATION:  The patient is 40years old  N93 9: Abnormal uterine and vaginal bleeding, unspecified      COMPARISON: January 13, 2021      TECHNIQUE:   Transabdominal pelvic ultrasound was performed in sagittal and transverse planes with a curvilinear transducer  Additional transvaginal imaging was performed to better evaluate the endometrium and ovaries  Imaging included volumetric   sweeps as well as traditional still imaging technique      FINDINGS:     UTERUS:  The uterus is anteverted in position, measuring 6 5 x 3 7 x 5 1 cm  The uterus has a normal contour and echotexture  Again seen is a submucosal fundal leiomyoma measuring 2 2 x 2 1 x 1 6 cm, previously 1 5 x 1 5 x 1 2 cm  Mildly complex nabothian cyst with internal echoes  Otherwise, the cervix appears within normal limits      ENDOMETRIUM:    The endometrial echo complex has an AP caliber of 7 0 mm  Its appearance is within normal limits for age and cycle and shows no filling defects        OVARIES/ADNEXA:  Right ovary: 2 1 x 1 9 x 1 0 cm  2 2 mL  No suspicious right ovarian abnormality  Doppler flow within normal limits      Left ovary:  2 1 x 1 3 x 1 1 cm  1 6 mL  No suspicious left ovarian abnormality  Doppler flow within normal limits      No suspicious adnexal mass or loculated collections  There is no free fluid      IMPRESSION:     1  Interval increase in size of submucosal fundal leiomyoma, currently measuring 2 2 x 2 1 x 1 6 cm (previously 1 5 x 1 5 x 1 2 cm)      2   Normal ovaries and endometrium  No suspicious adnexal mass      Pervis MD Medina  6/30/2022  11:47 AM

## 2022-06-30 ENCOUNTER — OFFICE VISIT (OUTPATIENT)
Dept: OBGYN CLINIC | Facility: CLINIC | Age: 47
End: 2022-06-30

## 2022-06-30 VITALS
HEART RATE: 73 BPM | HEIGHT: 64 IN | BODY MASS INDEX: 24.59 KG/M2 | SYSTOLIC BLOOD PRESSURE: 100 MMHG | DIASTOLIC BLOOD PRESSURE: 57 MMHG | WEIGHT: 144 LBS

## 2022-06-30 DIAGNOSIS — Z98.890 S/P ENDOMETRIAL ABLATION: ICD-10-CM

## 2022-06-30 DIAGNOSIS — N93.9 ABNORMAL UTERINE BLEEDING: Primary | ICD-10-CM

## 2022-06-30 PROCEDURE — 99213 OFFICE O/P EST LOW 20 MIN: CPT | Performed by: STUDENT IN AN ORGANIZED HEALTH CARE EDUCATION/TRAINING PROGRAM

## 2022-06-30 NOTE — ASSESSMENT & PLAN NOTE
Current differential of continued bleeding is withdrawal bleeding following completion of Aygestin vs breakthrough bleeding secondary to failed endometrial ablation  Patient's bleeding is decreased compared to normal menstrual cycle, but overall her goals of amenorrhea have not yet been achieved and she is >8 weeks from ablation  Pelvic ultrasound WNL  Patient expressed desire for hysterectomy if meets criteria for failed ablation  She will continue with Depo Provera for contraception, next injection scheduled for 7/5/22  Discussed with patient if she has no further bleeding and current bleeding was secondary to Aygestin withdrawal, plan to RTC for annual exam  However, if period continues, she will call to schedule follow-up for discussion regarding hysterectomy  All questions answered at this time and patient agreeable to plan

## 2022-07-05 ENCOUNTER — CLINICAL SUPPORT (OUTPATIENT)
Dept: OBGYN CLINIC | Facility: CLINIC | Age: 47
End: 2022-07-05

## 2022-07-05 DIAGNOSIS — Z30.42 ENCOUNTER FOR MANAGEMENT AND INJECTION OF DEPO-PROVERA: Primary | ICD-10-CM

## 2022-07-05 LAB — SL AMB POCT URINE HCG: NORMAL

## 2022-07-05 PROCEDURE — 96372 THER/PROPH/DIAG INJ SC/IM: CPT

## 2022-07-05 PROCEDURE — 81025 URINE PREGNANCY TEST: CPT

## 2022-07-05 RX ADMIN — MEDROXYPROGESTERONE ACETATE 150 MG: 150 INJECTION, SUSPENSION INTRAMUSCULAR at 12:30

## 2022-07-05 NOTE — PROGRESS NOTES
Depo-Provera      [x]   Patient provided box yes   o 2 Refills remain  o Refills submitted no   Last  Annual Date / Birth control check : 05/31/2022//   Last Depo date: 04/18/2022   Side effects: no   HCG: yes  o if applicable: negative   Given by: Sabrina Benson RN   Site: Right Deltoid    o Calcium supplement daily teaching  o Condoms for 2 weeks following first injection dose

## 2022-07-25 ENCOUNTER — OFFICE VISIT (OUTPATIENT)
Dept: DENTISTRY | Facility: CLINIC | Age: 47
End: 2022-07-25

## 2022-07-25 VITALS — TEMPERATURE: 98.1 F | DIASTOLIC BLOOD PRESSURE: 76 MMHG | HEART RATE: 116 BPM | SYSTOLIC BLOOD PRESSURE: 113 MMHG

## 2022-07-25 DIAGNOSIS — K02.9 DENTAL CARIES INTO PULP: Primary | ICD-10-CM

## 2022-07-25 PROCEDURE — D7210 EXTRACTION, ERUPTED TOOTH REQUIRING REMOVAL OF BONE AND/OR SECTIONING OF TOOTH, AND INCLUDING ELEVATION OF MUCOPERIOSTEAL FLAP IF INDICATED: HCPCS | Performed by: DENTIST

## 2022-07-25 NOTE — PROGRESS NOTES
Oral Surgery    Francesca Batista presents for Ext #30    [facility], patient denies any changes  Obtained a direct and personal consent  Risks and complications were explained  Pt agreed and consented  Consent scanned in doc center  Pre-Op BP WNL  Administered cc of 2 % Lidocaine w/ 1:100,000 epi via block and 1 carp 4% articaine through allegra infiltration  Adequate anesthesia obtained, reflected gingiva, elevated, and extracted #30  Tooth and all apices accounted for  Socket irrigated, and 4 0 chromic gut sutures placed  Upon dismissal, patient received written and verbal POI, gauze  All questions answered  Pt left in good condition       NV: new pt exam and prophy

## 2022-08-02 ENCOUNTER — ANNUAL EXAM (OUTPATIENT)
Dept: OBGYN CLINIC | Facility: CLINIC | Age: 47
End: 2022-08-02

## 2022-08-02 VITALS
HEIGHT: 64 IN | DIASTOLIC BLOOD PRESSURE: 79 MMHG | SYSTOLIC BLOOD PRESSURE: 118 MMHG | WEIGHT: 145.6 LBS | HEART RATE: 89 BPM | BODY MASS INDEX: 24.86 KG/M2

## 2022-08-02 DIAGNOSIS — Z11.3 SCREENING EXAMINATION FOR STD (SEXUALLY TRANSMITTED DISEASE): ICD-10-CM

## 2022-08-02 DIAGNOSIS — Z00.00 ANNUAL PHYSICAL EXAM: Primary | ICD-10-CM

## 2022-08-02 PROCEDURE — 99396 PREV VISIT EST AGE 40-64: CPT | Performed by: OBSTETRICS & GYNECOLOGY

## 2022-08-02 PROCEDURE — 87591 N.GONORRHOEAE DNA AMP PROB: CPT

## 2022-08-02 PROCEDURE — 87491 CHLMYD TRACH DNA AMP PROBE: CPT

## 2022-08-02 RX ORDER — MULTIVITAMIN
1 TABLET ORAL DAILY
Qty: 30 TABLET | Refills: 11 | Status: SHIPPED | OUTPATIENT
Start: 2022-08-02

## 2022-08-02 NOTE — PROGRESS NOTES
Subjective     Mary Merrill is a 56 y o  female who presents for annual well woman exam  She had an endometrial ablation for AUB on 4/1/2022 and has not had any bleeding in the last 2 months  GYN:  · She has no vaginal discharge, labial erythema or lesions, dyspareunia  · Contraception: none  · Patient is  sexually active with one male partner  · Gynecologic surgery: endometrial ablation    OB:  · [id number] female  :  · No dysuria, urinary frequency or urgency  · No hematuria, flank pain, incontinence  Breast:  · No breast mass, skin changes, dimpling, reddening, nipple retraction  · No breast discharge  · Last mammogram was in 5/2022  Results were normal    · Patient does have a family history of breast cancer in her mother and maternal grandmother  General:  · Diet: Balanced, avoids diary products as she suffers from diarrhea, followed by GI  Last colonoscopy was last year with normal findings per patient  · Exercise: nothing formal  Planning to start walking few times a week  · Work: not working right now  · ETOH use: none  · Tobacco use: none  · Recreational drug use: none    Screening:  · Cervical cancer: last pap smear in 1/2021  Results were normal  Next due 1/2026  · Breast cancer: last mammogram in 5/2022  Results were normal  Next due 5/2023  · Colon cancer: last colonoscopy in last year in [facility] per patient  Results were normal per patient  · STD screening: ordered today  Review of Systems  Pertinent items are noted in HPI  Objective      /79   Pulse 89   Ht 5' 4" (1 626 m)   Wt 66 kg (145 lb 9 6 oz)   LMP 07/04/2022 (Approximate)   BMI 24 99 kg/m²     Physical Exam  Constitutional:       Appearance: She is normal weight  HENT:      Head: Normocephalic  Mouth/Throat:      Pharynx: Oropharynx is clear  Eyes:      Conjunctiva/sclera: Conjunctivae normal    Cardiovascular:      Rate and Rhythm: Normal rate and regular rhythm  Pulses: Normal pulses  Heart sounds: Normal heart sounds  Pulmonary:      Effort: Pulmonary effort is normal       Breath sounds: Normal breath sounds  Chest:   Breasts: Efraín Score is 5  Right: No swelling, bleeding, inverted nipple, mass, nipple discharge, skin change or tenderness  Left: Normal  No swelling, bleeding, inverted nipple, mass, nipple discharge, skin change or tenderness  Abdominal:      Palpations: Abdomen is soft  Tenderness: There is no abdominal tenderness  Genitourinary:     General: Normal vulva  Vagina: No vaginal discharge  Comments: Speculum:  Normal vaginal and cervical mucosa  Normal physiologic discharge  No malodor noted  Bimanual:  No cervical motion tenderness, adnexal tenderness or fullness  Skin:     General: Skin is warm  Neurological:      General: No focal deficit present  Mental Status: She is alert  Psychiatric:         Mood and Affect: Mood normal             Assessment     Destiny Chou is a 56 y o  [id number] with normal gynecologic exam      Plan     40+ premenopausal  1  Routine well woman exam done today  2   Pap and HPV:Pap with HPV was not done today  Current ASCCP Guidelines reviewed  3   Mammogram ordered  Recommend yearly mammography  4   The patient accepted STD testing  chlamydia, gonorrhea, HIV and Hep B, Hep C, RPR testing performed  Safe sex practices have been discussed  5  The patient is sexually active  She is s/p endometrial ablation and encouraged use of condoms for STD prevention  6  The following were reviewed in today's visit: mammography screening ordered, STD testing, HIV risk factors and prevention, menopause, osteoporosis, adequate intake of calcium and vitamin D, exercise and healthy diet    7  Patient to return to office in 12 months for annual      Wu Judge MD  PGY-2  8/4/2022  3:29 PM

## 2022-08-03 LAB
C TRACH DNA SPEC QL NAA+PROBE: NEGATIVE
N GONORRHOEA DNA SPEC QL NAA+PROBE: NEGATIVE

## 2022-08-22 ENCOUNTER — OFFICE VISIT (OUTPATIENT)
Dept: DENTISTRY | Facility: CLINIC | Age: 47
End: 2022-08-22

## 2022-08-22 VITALS — TEMPERATURE: 98.7 F | SYSTOLIC BLOOD PRESSURE: 113 MMHG | DIASTOLIC BLOOD PRESSURE: 66 MMHG | HEART RATE: 71 BPM

## 2022-08-22 DIAGNOSIS — Z01.20 ENCOUNTER FOR DENTAL EXAMINATION: Primary | ICD-10-CM

## 2022-08-22 PROBLEM — K05.30 PERIODONTITIS: Status: ACTIVE | Noted: 2022-08-22

## 2022-08-22 PROCEDURE — D0210 INTRAORAL - COMPLETE SERIES OF RADIOGRAPHIC IMAGES: HCPCS

## 2022-08-22 PROCEDURE — D0150 COMPREHENSIVE ORAL EVALUATION - NEW OR ESTABLISHED PATIENT: HCPCS

## 2022-08-22 NOTE — PROGRESS NOTES
COMP/FMX    CC: Want to replace missing teeth  Has been many years since last dental visit/cleaning  Reviewed meds/hhx in Epic  ASA II    FMX taken  Full Mouth Perio charting completed  PERIO: Stage 2, grade B  Treatment Recommended: FM SRP    Discussed periodontal disease with patient  Patient understands and agrees to all recommended treatment discussed  Dr Chikis Garcia Exam:  1)UR SRP  2)LR SRP  3)UL SRP  4)LL SRP  5)models and treatment planning session  6)caries control    Patient is interested in fixed options for tooth replacement

## 2022-09-22 ENCOUNTER — CLINICAL SUPPORT (OUTPATIENT)
Dept: OBGYN CLINIC | Facility: CLINIC | Age: 47
End: 2022-09-22

## 2022-09-22 DIAGNOSIS — Z30.42 DEPO-PROVERA CONTRACEPTIVE STATUS: Primary | ICD-10-CM

## 2022-09-22 PROCEDURE — 96372 THER/PROPH/DIAG INJ SC/IM: CPT

## 2022-09-22 RX ORDER — MEDROXYPROGESTERONE ACETATE 150 MG/ML
150 INJECTION, SUSPENSION INTRAMUSCULAR ONCE
Status: COMPLETED | OUTPATIENT
Start: 2022-09-22 | End: 2022-09-22

## 2022-09-22 RX ADMIN — MEDROXYPROGESTERONE ACETATE 150 MG: 150 INJECTION, SUSPENSION INTRAMUSCULAR at 10:14

## 2022-09-22 NOTE — PROGRESS NOTES
Depo-Provera      [x]   Patient provided box Yes    o 11 Refills remain  o Refills submitted No   Last  Annual Date / Birth control check : 8/02/2022   Last Depo date: 7/05/2022   Side effects: No   HCG: No   Given by: Louisa Mitchell MA   Site: RD    o Calcium supplement daily teaching  o Condoms for 2 weeks following first injection dose

## 2022-10-12 PROBLEM — Z13.29 SCREENING FOR THYROID DISORDER: Status: RESOLVED | Noted: 2021-01-07 | Resolved: 2022-10-12

## 2022-12-08 ENCOUNTER — CLINICAL SUPPORT (OUTPATIENT)
Dept: OBGYN CLINIC | Facility: CLINIC | Age: 47
End: 2022-12-08

## 2022-12-08 DIAGNOSIS — Z30.013 ENCOUNTER FOR INITIAL PRESCRIPTION OF INJECTABLE CONTRACEPTIVE: ICD-10-CM

## 2022-12-08 DIAGNOSIS — Z30.42 ENCOUNTER FOR MANAGEMENT AND INJECTION OF DEPO-PROVERA: ICD-10-CM

## 2022-12-08 RX ORDER — MEDROXYPROGESTERONE ACETATE 150 MG/ML
150 INJECTION, SUSPENSION INTRAMUSCULAR
Qty: 1 ML | Refills: 2 | Status: SHIPPED | OUTPATIENT
Start: 2022-12-08

## 2022-12-08 RX ADMIN — MEDROXYPROGESTERONE ACETATE 150 MG: 150 INJECTION, SUSPENSION INTRAMUSCULAR at 10:25

## 2022-12-08 NOTE — PROGRESS NOTES
Depo-Provera     • [x]   Patient provided box  / Yes  o (0) Refills remain  o Refills submitted / Provider messaged  • Last  Annual Due - 8/3/23  • Last Depo date: 9/22/22  • Side effects: None reported  • HCG: No  • Given by: K Reyes  • Site: RD    o Calcium supplement daily teaching  o Condoms for 2 weeks following first injection dose

## 2023-02-24 ENCOUNTER — CLINICAL SUPPORT (OUTPATIENT)
Dept: OBGYN CLINIC | Facility: CLINIC | Age: 48
End: 2023-02-24

## 2023-02-24 DIAGNOSIS — Z30.42 ENCOUNTER FOR MANAGEMENT AND INJECTION OF DEPO-PROVERA: ICD-10-CM

## 2023-02-24 RX ADMIN — MEDROXYPROGESTERONE ACETATE 150 MG: 150 INJECTION, SUSPENSION INTRAMUSCULAR at 10:15

## 2023-02-24 NOTE — PROGRESS NOTES
Depo-Provera     • [x]   Patient provided box yes  o 2 Refills remain  o Refills submitted no  • Last  Annual Date / Birth control check : 8/2/2022  • Last Depo date: 12/8/22  • Side effects: no  • HCG: no  • Given by: Stuart Arceo  • Site: LD    o Calcium supplement daily teaching  o Condoms for 2 weeks following first injection dose

## 2023-02-26 NOTE — PROGRESS NOTES
[address] [facility],[address] 56 y o  SUBJECTIVE    CC:  "I'm having pain"    HPI: Carol Davis is a 56 y o  [id number] presenting today for discussion of pelvic pain  She underwent a hysteroscopy, D&C, IUD removal, and endometrial ablation 4/1/22 for abnormal uterine bleeding  She had ongoing bleeding postoperatively, and considered a hysterectomy due to ongoing bleeding postoperatively for several weeks  She was started on Aygestin briefly for control of symptoms  Ultrasound 6/2022 showed a submucosal fundal fibroid 2 2cm, but otherwise normal anatomy  She had her annual exam 8/2022 and had amenorrhea at that time  She denied pelvic pain at that time  She has not had any bleeding recently  She reports that for the past month she's been having left sided pain, and some low back pain  Pain wraps around the left flank  It is intermittent  She has trouble sleeping at night sometimes due to the pain, needs to sleep on her side partly because of the back pain  She is uncertain how much the back pain is contributing to her other pains  Tylenol helps a little bit  No known triggers  Occasionally will be triggered with intercourse  She denies any dysuria, hematuria, or history of kidney stones  She denies any fevers or chills  She denies any constipation, diarrhea, melena, or hematochezia  She is unable to take Motrin due to prior history of gastric ulcers  She takes Depo Provera for contraception, last dose 2/24/23  She has a history of breast cancer in her mother and maternal grandmother  OBJECTIVE  Vitals:    02/27/23 1331   BP: 118/75   BP Location: Left arm   Patient Position: Sitting   Cuff Size: Large   Pulse: (!) 106   Resp: 18   Weight: 68 kg (150 lb)   Height: 4' 9" (1 448 m)         Physical Exam  Constitutional:       General: She is not in acute distress  Appearance: She is normal weight  She is not ill-appearing or toxic-appearing     Genitourinary:      Vulva normal       Right Labia: No rash, tenderness or lesions  Left Labia: No tenderness, lesions or rash  Vaginal tenderness (tender to palpation on the posterior vaginal wall along the rectum) present  No vaginal bleeding  Vaginal exam comments: Tender to palpation in posterior vault as well  Right Adnexa: not tender  Left Adnexa: not tender  Cervical motion tenderness (tender to mobilization of uterus) present  Uterus is tender (bimanual exam tender to palpation, not significantly increased from other portions of the exam)  Levator ani not tender and obturator internus not tender  HENT:      Head: Normocephalic and atraumatic  Eyes:      Conjunctiva/sclera: Conjunctivae normal    Cardiovascular:      Pulses: Normal pulses  Pulmonary:      Effort: Pulmonary effort is normal  No respiratory distress  Abdominal:      General: There is no distension  Palpations: Abdomen is soft  Tenderness: There is abdominal tenderness in the left lower quadrant  There is no right CVA tenderness, left CVA tenderness, guarding or rebound  Comments: Tender to deep palpation in the left lower quadrant, just medial to the iliac spine   Musculoskeletal:         General: Normal range of motion  Cervical back: Normal range of motion  Right lower leg: No edema  Left lower leg: No edema  Neurological:      General: No focal deficit present  Mental Status: She is alert  Mental status is at baseline  Skin:     General: Skin is warm and dry  Psychiatric:         Mood and Affect: Mood normal          Behavior: Behavior normal    Exam conducted with a chaperone present  Lab Results:   No visits with results within 1 Day(s) from this visit     Latest known visit with results is:   Annual Exam on 08/02/2022   Component Date Value   • N gonorrhoeae, DNA Probe 08/02/2022 Negative    • Chlamydia trachomatis, D* 08/02/2022 Negative ASSESSMENT    48yo [id number] who presents for discussion of pelvic pain, localized in the LLQ  PLAN  Reviewed complex differential diagnosis for pelvic pain  Discussed that may be secondary to , GYN, or GI causes, and that evaluation can be complex  Given left sided pain that is lateral on the abdomen, recommended that the patient discuss with her GI doctor if there is any concern for colitis or colonic etiology of the pain  This may also account for the tenderness to palpation of the posterior vaginal vault  As patient has had prior ablation, consideration for post-ablation syndrome causing uterine and cervical pain  Pelvic ultrasound ordered to evaluate for any structural abnormalities that have developed postoperatively  Discussed that patient's low back pain may also be adding to complexity of diagnosis, and recommended referral for further evaluation and treatment  Patient is amenable  Referral to comprehensive spine program placed    Return to care in 4 weeks to follow up pain  Can consider testing for vaginitis or other vaginal causes of pain at that time  Last pap smear 01/07/2021, due for next pap smear 1/2026    All questions were answered & patient expressed understanding      Patient was discussed with Dr Nova Humphries MD  OBGYN R-4  2/27/2023

## 2023-02-27 ENCOUNTER — OFFICE VISIT (OUTPATIENT)
Dept: OBGYN CLINIC | Facility: CLINIC | Age: 48
End: 2023-02-27

## 2023-02-27 VITALS
BODY MASS INDEX: 32.36 KG/M2 | HEART RATE: 106 BPM | HEIGHT: 57 IN | WEIGHT: 150 LBS | RESPIRATION RATE: 18 BRPM | SYSTOLIC BLOOD PRESSURE: 118 MMHG | DIASTOLIC BLOOD PRESSURE: 75 MMHG

## 2023-02-27 DIAGNOSIS — M54.50 BILATERAL LOW BACK PAIN WITHOUT SCIATICA, UNSPECIFIED CHRONICITY: ICD-10-CM

## 2023-02-27 DIAGNOSIS — R10.2 PELVIC PAIN: Primary | ICD-10-CM

## 2023-02-28 ENCOUNTER — NURSE TRIAGE (OUTPATIENT)
Dept: PHYSICAL THERAPY | Facility: OTHER | Age: 48
End: 2023-02-28

## 2023-02-28 ENCOUNTER — OFFICE VISIT (OUTPATIENT)
Dept: INTERNAL MEDICINE CLINIC | Facility: CLINIC | Age: 48
End: 2023-02-28

## 2023-02-28 VITALS
TEMPERATURE: 98.6 F | BODY MASS INDEX: 32.32 KG/M2 | HEIGHT: 57 IN | SYSTOLIC BLOOD PRESSURE: 118 MMHG | WEIGHT: 149.8 LBS | OXYGEN SATURATION: 99 % | DIASTOLIC BLOOD PRESSURE: 81 MMHG | HEART RATE: 97 BPM

## 2023-02-28 DIAGNOSIS — M54.50 ACUTE LEFT-SIDED LOW BACK PAIN WITHOUT SCIATICA: Primary | ICD-10-CM

## 2023-02-28 DIAGNOSIS — R19.8 TENESMUS (RECTAL): ICD-10-CM

## 2023-02-28 DIAGNOSIS — M54.50 ACUTE LEFT-SIDED LOW BACK PAIN, UNSPECIFIED WHETHER SCIATICA PRESENT: Primary | ICD-10-CM

## 2023-02-28 RX ORDER — IBUPROFEN 600 MG/1
600 TABLET ORAL EVERY 6 HOURS PRN
Qty: 15 TABLET | Refills: 0 | Status: SHIPPED | OUTPATIENT
Start: 2023-02-28

## 2023-02-28 NOTE — TELEPHONE ENCOUNTER
Additional Information  • Negative: Is this related to a work injury? • Negative: Is this related to an MVA? • Negative: Are you currently recieving homecare services? Background - Initial Assessment  Clinical complaint: Pain is bilat low back, radiates down to the left buttock  Pain wraps around to left flank  No pain in left leg, but on occasion has numbness and tingling  Started about 6 weeks ago  NKI  Is ref in by [facility]  Has not been treated for this specific back pain  2 yrs ago did have PT for her left leg     Date of onset: 6 weeks  Frequency of pain: intermittent  Quality of pain: aching    Protocols used: SL AMB COMPREHENSIVE SPINE PROGRAM PROTOCOL

## 2023-02-28 NOTE — TELEPHONE ENCOUNTER
Additional Information  • Negative: Has the patient had unexplained weight loss? • Negative: Does the patient have a fever? • Negative: Is the patient experiencing urine retention? • Negative: Is the patient experiencing acute drop foot or paralysis? • Negative: Has the patient experienced major trauma? (fall from height, high speed collision, direct blow to spine) and is also experiencing nausea, light-headedness, or loss of consciousness? • Negative: Is the patient experiencing blood in sputum? • Negative: Is this a chronic condition? Protocols used: SL AMB COMPREHENSIVE SPINE PROGRAM PROTOCOL    This RN did review in detail the Comprehensive Spine Program and what we can provide for their back pain  Patient is agreeable to being triaged by this RN and would like to proceed with Physical Therapy  Referral was placed for Physical Therapy at the [facility]  Patients information was sent to the  to make evaluation appointment  Patient made aware that the PT office  will be calling to schedule the appointment  Patient was provided with the phone number to the PT office  No further questions and/or concerns were voiced by the patient at this time  Patient states understanding of the referral that was placed  Referral Closed

## 2023-02-28 NOTE — PROGRESS NOTES
[address] [facility]  INTERNAL MEDICINE ACUTE VISIT     PATIENT INFORMATION     Patt Nye   56 y o  female   MRN: [id number]    ASSESSMENT/PLAN     Diagnoses and all orders for this visit:    Acute left-sided low back pain without sciatica  Tenesmus (rectal)  DDx include lumbar radiculopathy vs GI pathology  Associated with tenesmus  No signs of hemorrhoids, rectal pain, bloody BMm constipation  Follow up with GI, scheduled for 3/2/23  Follow up pelvic US  If needed, will consider CT pelvis  Advised to come back to the office if start experiencing fever, chills, bloody stool, rectal or urinary incontinence, neurologic problems including numbness, tingling or weakness   -     ibuprofen (MOTRIN) 600 mg tablet; Take 1 tablet (600 mg total) by mouth every 6 (six) hours as needed for mild pain    CHIEF COMPLAINT     No chief complaint on file  HISTORY OF PRESENT ILLNESS     41-year-old female with past medical history of GERD, migraines, and abnormal uterine bleeding s/p hyshysteroscopy, D&C, IUD removal, and endometrial ablation on April 2022 presenting for evaluation of back pain  Patient reports left sided lower back pain  Pain is exacerbated by movement and occasionally triggered by intercourse  Reports Tylenol barely helps  Patient reports that pain is associated with rectal pressure  She is having BM but every time she feels the need to go due to rectal pressure but nothing comes out  Denies blood in her stool  Denies rectal pain  Denies urinary problems, fever and chills  Patient has also tried lidocaine patches with some relief  Reports pain is mostly at night and morning  No difficulty walking or associated weakness  Patient reports that sometimes pain radiates to left lower quadrant  She had a pelvic exam yesterday which was negative  Gonorrhea and Chlamydia negative in the past     She was referred to Comprehensive Spine Program and GI by her OBGYN physician  REVIEW OF SYSTEMS     Review of Systems   Constitutional: Negative for chills and fever  HENT: Negative for ear pain and sore throat  Eyes: Negative for pain and visual disturbance  Respiratory: Negative for cough and shortness of breath  Cardiovascular: Negative for chest pain and palpitations  Gastrointestinal: Positive for rectal pain  Negative for abdominal pain, anal bleeding, blood in stool, constipation and vomiting  Genitourinary: Negative for dysuria and hematuria  Musculoskeletal: Positive for back pain  Negative for arthralgias  Skin: Negative for color change and rash  Neurological: Negative for seizures and syncope  All other systems reviewed and are negative  OBJECTIVE   There were no vitals filed for this visit  Physical Exam  Vitals and nursing note reviewed  Constitutional:       General: She is not in acute distress  Appearance: She is well-developed  HENT:      Head: Normocephalic and atraumatic  Eyes:      Conjunctiva/sclera: Conjunctivae normal    Cardiovascular:      Rate and Rhythm: Normal rate and regular rhythm  Heart sounds: No murmur heard  Pulmonary:      Effort: Pulmonary effort is normal  No respiratory distress  Breath sounds: Normal breath sounds  Abdominal:      Palpations: Abdomen is soft  Tenderness: There is no abdominal tenderness  Genitourinary:     Vagina: No vaginal discharge  Rectum: Normal  Guaiac result negative  No tenderness, external hemorrhoid or internal hemorrhoid  Musculoskeletal:         General: No swelling  Cervical back: Neck supple  Skin:     General: Skin is warm and dry  Capillary Refill: Capillary refill takes less than 2 seconds  Neurological:      Mental Status: She is alert     Psychiatric:         Mood and Affect: Mood normal        CURRENT MEDICATIONS     Current Outpatient Medications:   •  aspirin-acetaminophen-caffeine (EXCEDRIN MIGRAINE) 889-615-44 MG per tablet, Take 1 tablet by mouth every 6 (six) hours as needed for headaches (Patient not taking: Reported on 2/27/2023), Disp: , Rfl:   •  EPINEPHrine (EPIPEN) 0 3 mg/0 3 mL SOAJ, Inject 0 3 mL (0 3 mg total) into a muscle once as needed for anaphylaxis for up to 1 dose, Disp: 0 6 mL, Rfl: 2  •  medroxyPROGESTERone (DEPO-PROVERA) 150 mg/mL injection, Inject 1 mL (150 mg total) into a muscle every 3 (three) months, Disp: 1 mL, Rfl: 2  •  Multiple Vitamin (multivitamin) tablet, Take 1 tablet by mouth daily, Disp: 30 tablet, Rfl: 11  •  norethindrone (Aygestin) 5 mg tablet, Take 4 tablets (20 mg total) by mouth daily for 4 days, THEN 3 tablets (15 mg total) daily for 3 days, THEN 2 tablets (10 mg total) daily for 2 days, THEN 1 tablet (5 mg total) daily  (Patient not taking: Reported on 6/30/2022), Disp: 59 tablet, Rfl: 0  •  omeprazole (PriLOSEC) 40 MG capsule, Take 1 capsule (40 mg total) by mouth daily, Disp: 30 capsule, Rfl: 5    Current Facility-Administered Medications:   •  medroxyPROGESTERone (DEPO-PROVERA) IM injection 150 mg, 150 mg, Intramuscular, Q3 Months, [place], 150 mg at 02/24/23 1015    PAST MEDICAL & SURGICAL HISTORY     Past Medical History:   Diagnosis Date   • GERD (gastroesophageal reflux disease)    • Migraine     takes excederine migraine     Past Surgical History:   Procedure Laterality Date   • DENTAL SURGERY     • ENDOMETRIAL ABLATION N/A 4/1/2022    Procedure: ABLATION ENDOMETRIAL [place];  Surgeon: Armand Marinelli MD;  Location: [facility];  Service: Gynecology   • W[place] HYSTEROSCOPY BX ENDOMETRIUM&/POLYPC W/WO D&C N/A 4/1/2022    Procedure: DILATATION AND CURETTAGE (D&C) WITH HYSTEROSCOPY;  Surgeon: Armand Marinleli MD;  [facility]: [facility];  Service: Gynecology   • [place] REMOVAL INTRAUTERINE DEVICE IUD N/A 4/1/2022    Procedure: REMOVAL OF INTRAUTERINE DEVICE (IUD);   Surgeon: Armand Marinelli MD;  Location: [facility];  Service: Gynecology     SOCIAL & FAMILY HISTORY     Social History Socioeconomic History   • Marital status: /Civil Union     Spouse name: Ayde Torres   • Number of children: 2   • Years of education: Not on file   • Highest education level: Not on file   Occupational History   • Occupation:    Tobacco Use   • Smoking status: Never   • Smokeless tobacco: Never   Vaping Use   • Vaping Use: Never used   Substance and Sexual Activity   • Alcohol use: Yes     Comment: social   • Drug use: No   • Sexual activity: Yes     Partners: Male     Birth control/protection: Female Sterilization, Injection   Other Topics Concern   • Not on file   Social History Narrative   • Not on file     Social Determinants of Health     Financial Resource Strain: Low Risk    • Difficulty of Paying Living Expenses: Not very hard   Food Insecurity: No Food Insecurity   • Worried About Running Out of Food in the Last Year: Never true   • Ran Out of Food in the Last Year: Never true   Transportation Needs: No Transportation Needs   • Lack of Transportation (Medical): No   • Lack of Transportation (Non-Medical): No   Physical Activity: Inactive   • Days of Exercise per Week: 0 days   • Minutes of Exercise per Session: 0 min   Stress: No Stress Concern Present   • Feeling of Stress : Only a little   Social Connections:  Moderately Integrated   • Frequency of Communication with Friends and Family: More than three times a week   • Frequency of Social Gatherings with Friends and Family: More than three times a week   • Attends [employer] Services: More than 4 times per year   • Active Member of Clubs or Organizations: No   • Attends Club or Organization Meetings: Never   • Marital Status:    Intimate Partner Violence: Not At Risk   • Fear of Current or Ex-Partner: No   • Emotionally Abused: No   • Physically Abused: No   • Sexually Abused: No   Housing Stability: Unknown   • Unable to Pay for Housing in the Last Year: No   • Number of Places Lived in the Last Year: Not on file   • Unstable Housing in the Last Year: No     Social History     Substance and Sexual Activity   Alcohol Use Yes    Comment: social     Social History     Substance and Sexual Activity   Drug Use No     Social History     Tobacco Use   Smoking Status Never   Smokeless Tobacco Never     Family History   Problem Relation Age of Onset   • Breast cancer Mother         58 y/o    • BRCA1 Negative Mother    • Lung cancer Mother 77   • Diabetes Father    • Hypertension Father    • No Known Problems Daughter    • Breast cancer Maternal Grandmother 46   • Lung cancer Maternal Grandmother 64   • Alzheimer's disease Maternal Grandfather    • Heart disease Paternal Grandmother    • No Known Problems Paternal Grandfather    • No Known Problems Brother    • No Known Problems Brother    • No Known Problems Brother    • No Known Problems Son    • No Known Problems Son    • No Known Problems Maternal Aunt    • No Known Problems Maternal Aunt    • No Known Problems Maternal Aunt    • No Known Problems Maternal Aunt    • Lung cancer Maternal Uncle 48   • No Known Problems Paternal Aunt    • No Known Problems Paternal Aunt    • No Known Problems Paternal Aunt    • No Known Problems Paternal Aunt    • Breast cancer Cousin         age dx unknown   • Throat cancer Cousin 39   • BRCA1 Positive Cousin    • Kidney cancer Cousin 39             ==  Sada Llanes MD PGY2  [address] Internal Medicine [address] [facility] , [address] [address] [facility] [address] [facility]  Office: [phone]  Fax: [phone]

## 2023-03-02 ENCOUNTER — OFFICE VISIT (OUTPATIENT)
Dept: INTERNAL MEDICINE CLINIC | Facility: CLINIC | Age: 48
End: 2023-03-02

## 2023-03-02 ENCOUNTER — HOSPITAL ENCOUNTER (OUTPATIENT)
Dept: RADIOLOGY | Facility: HOSPITAL | Age: 48
Discharge: HOME | End: 2023-03-02

## 2023-03-02 ENCOUNTER — APPOINTMENT (OUTPATIENT)
Dept: LAB | Facility: CLINIC | Age: 48
End: 2023-03-02

## 2023-03-02 VITALS
TEMPERATURE: 98.1 F | DIASTOLIC BLOOD PRESSURE: 76 MMHG | HEIGHT: 57 IN | OXYGEN SATURATION: 98 % | SYSTOLIC BLOOD PRESSURE: 116 MMHG | HEART RATE: 90 BPM | WEIGHT: 153 LBS | RESPIRATION RATE: 15 BRPM | BODY MASS INDEX: 33.01 KG/M2

## 2023-03-02 DIAGNOSIS — Z13.29 SCREENING FOR THYROID DISORDER: ICD-10-CM

## 2023-03-02 DIAGNOSIS — E78.2 MODERATE MIXED HYPERLIPIDEMIA NOT REQUIRING STATIN THERAPY: ICD-10-CM

## 2023-03-02 DIAGNOSIS — E55.9 VITAMIN D DEFICIENCY: ICD-10-CM

## 2023-03-02 DIAGNOSIS — M54.16 LUMBAR RADICULOPATHY, ACUTE: Primary | ICD-10-CM

## 2023-03-02 DIAGNOSIS — K21.9 GASTROESOPHAGEAL REFLUX DISEASE, UNSPECIFIED WHETHER ESOPHAGITIS PRESENT: ICD-10-CM

## 2023-03-02 DIAGNOSIS — M54.16 LUMBAR RADICULOPATHY, ACUTE: ICD-10-CM

## 2023-03-02 DIAGNOSIS — Z12.31 SCREENING MAMMOGRAM FOR BREAST CANCER: ICD-10-CM

## 2023-03-02 LAB
25(OH)D3 SERPL-MCNC: 13.2 NG/ML (ref 30–100)
ALBUMIN SERPL BCP-MCNC: 4 G/DL (ref 3.5–5)
ALP SERPL-CCNC: 78 U/L (ref 46–116)
ALT SERPL W P-5'-P-CCNC: 32 U/L (ref 12–78)
ANION GAP SERPL CALCULATED.3IONS-SCNC: 2 MMOL/L (ref 4–13)
AST SERPL W P-5'-P-CCNC: 16 U/L (ref 5–45)
BASOPHILS # BLD AUTO: 0.04 THOUSANDS/ÂΜL (ref 0–0.1)
BASOPHILS NFR BLD AUTO: 1 % (ref 0–1)
BILIRUB SERPL-MCNC: 0.36 MG/DL (ref 0.2–1)
BUN SERPL-MCNC: 16 MG/DL (ref 5–25)
CALCIUM SERPL-MCNC: 9.2 MG/DL (ref 8.3–10.1)
CHLORIDE SERPL-SCNC: 112 MMOL/L (ref 96–108)
CHOLEST SERPL-MCNC: 142 MG/DL
CO2 SERPL-SCNC: 24 MMOL/L (ref 21–32)
CREAT SERPL-MCNC: 0.72 MG/DL (ref 0.6–1.3)
EOSINOPHIL # BLD AUTO: 0.05 THOUSAND/ÂΜL (ref 0–0.61)
EOSINOPHIL NFR BLD AUTO: 1 % (ref 0–6)
ERYTHROCYTE [DISTWIDTH] IN BLOOD BY AUTOMATED COUNT: 12.3 % (ref 11.6–15.1)
GFR SERPL CREATININE-BSD FRML MDRD: 100 ML/MIN/1.73SQ M
GLUCOSE P FAST SERPL-MCNC: 95 MG/DL (ref 65–99)
HCT VFR BLD AUTO: 40.7 % (ref 34.8–46.1)
HDLC SERPL-MCNC: 45 MG/DL
HGB BLD-MCNC: 13.1 G/DL (ref 11.5–15.4)
IMM GRANULOCYTES # BLD AUTO: 0.05 THOUSAND/UL (ref 0–0.2)
IMM GRANULOCYTES NFR BLD AUTO: 1 % (ref 0–2)
LDLC SERPL CALC-MCNC: 83 MG/DL (ref 0–100)
LYMPHOCYTES # BLD AUTO: 3.21 THOUSANDS/ÂΜL (ref 0.6–4.47)
LYMPHOCYTES NFR BLD AUTO: 37 % (ref 14–44)
MCH RBC QN AUTO: 30.4 PG (ref 26.8–34.3)
MCHC RBC AUTO-ENTMCNC: 32.2 G/DL (ref 31.4–37.4)
MCV RBC AUTO: 94 FL (ref 82–98)
MONOCYTES # BLD AUTO: 0.53 THOUSAND/ÂΜL (ref 0.17–1.22)
MONOCYTES NFR BLD AUTO: 6 % (ref 4–12)
NEUTROPHILS # BLD AUTO: 4.73 THOUSANDS/ÂΜL (ref 1.85–7.62)
NEUTS SEG NFR BLD AUTO: 54 % (ref 43–75)
NRBC BLD AUTO-RTO: 0 /100 WBCS
PLATELET # BLD AUTO: 272 THOUSANDS/UL (ref 149–390)
PMV BLD AUTO: 10.9 FL (ref 8.9–12.7)
POTASSIUM SERPL-SCNC: 3.8 MMOL/L (ref 3.5–5.3)
PROT SERPL-MCNC: 7.4 G/DL (ref 6.4–8.4)
RBC # BLD AUTO: 4.31 MILLION/UL (ref 3.81–5.12)
SODIUM SERPL-SCNC: 138 MMOL/L (ref 135–147)
TRIGL SERPL-MCNC: 71 MG/DL
TSH SERPL DL<=0.05 MIU/L-ACNC: 0.87 UIU/ML (ref 0.45–4.5)
WBC # BLD AUTO: 8.61 THOUSAND/UL (ref 4.31–10.16)

## 2023-03-02 RX ORDER — TIZANIDINE 4 MG/1
4 TABLET ORAL EVERY 8 HOURS PRN
Qty: 90 TABLET | Refills: 0 | Status: SHIPPED | OUTPATIENT
Start: 2023-03-02

## 2023-03-02 RX ORDER — MEDROXYPROGESTERONE ACETATE 150 MG/ML
INJECTION, SUSPENSION INTRAMUSCULAR
COMMUNITY
Start: 2023-02-22

## 2023-03-02 RX ORDER — METHYLPREDNISOLONE 4 MG/1
TABLET ORAL
Qty: 21 EACH | Refills: 0 | Status: SHIPPED | OUTPATIENT
Start: 2023-03-02

## 2023-03-02 RX ORDER — OMEPRAZOLE 40 MG/1
40 CAPSULE, DELAYED RELEASE ORAL DAILY
Qty: 90 CAPSULE | Refills: 3 | Status: SHIPPED | OUTPATIENT
Start: 2023-03-02
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
## 2023-03-03 DIAGNOSIS — E55.9 VITAMIN D DEFICIENCY: ICD-10-CM

## 2023-03-03 DIAGNOSIS — E53.8 VITAMIN B12 DEFICIENCY: ICD-10-CM

## 2023-03-03 DIAGNOSIS — M54.16 LUMBAR RADICULOPATHY, ACUTE: ICD-10-CM

## 2023-03-03 DIAGNOSIS — E61.1 IRON DEFICIENCY: ICD-10-CM

## 2023-03-03 DIAGNOSIS — K21.9 GASTROESOPHAGEAL REFLUX DISEASE, UNSPECIFIED WHETHER ESOPHAGITIS PRESENT: Primary | ICD-10-CM

## 2023-03-03 DIAGNOSIS — E78.2 MODERATE MIXED HYPERLIPIDEMIA NOT REQUIRING STATIN THERAPY: ICD-10-CM

## 2023-03-06 ENCOUNTER — HOSPITAL ENCOUNTER (OUTPATIENT)
Dept: RADIOLOGY | Facility: HOSPITAL | Age: 48
Discharge: HOME | End: 2023-03-06

## 2023-03-06 DIAGNOSIS — R10.2 PELVIC PAIN: ICD-10-CM

## 2023-03-13 ENCOUNTER — EVALUATION (OUTPATIENT)
Dept: PHYSICAL THERAPY | Facility: REHABILITATION | Age: 48
End: 2023-03-13

## 2023-03-13 VITALS — SYSTOLIC BLOOD PRESSURE: 122 MMHG | DIASTOLIC BLOOD PRESSURE: 82 MMHG

## 2023-03-13 DIAGNOSIS — M54.50 ACUTE LEFT-SIDED LOW BACK PAIN, UNSPECIFIED WHETHER SCIATICA PRESENT: Primary | ICD-10-CM

## 2023-03-13 NOTE — UTILIZATION REVIEW
COLONOSCOPY PROCEDURE NOTE        PATIENT NAME: Demetrius Garcia  : 1960   MRN: [id number]   PCP: Marc Irwin MD    PROCEDURE: Patient is 62 year old year old male who presents for COLONOSCOPY  DATE OF SERVICE: 3/13/2023  PERFORMING PROVIDER: Dora Frank MD      PREOPERATIVE DIAGNOSIS     Hx of colonic polyps [Z86.010]      POSTOPERATIVE DIAGNOSIS     Normal Colonoscopy  Fair colon prep      RECOMMENDATIONS     Next colonoscopy in 5 years due to fair prep and/or tortuous colon.  Next colonoscopy in 5 years due to personal history of colon polyps.        FINDINGS     Quality of colon bowel prep (Realitos bowel prep scale).  Right Colon: 2- (minor amount of residual staining, small fragments of stool, and\or opaque liquid, but mucosa of colon segment is seen well)  Transverse Colon: 2- (minor amount of residual staining, small fragments of stool, and\or opaque liquid, but mucosa of colon segment is seen well)  Left Colon: 2- (minor amount of residual staining, small fragments of stool, and\or opaque liquid, but mucosa of colon segment is seen well)    The colon prep was fair (after suctioning out copious amount of retained stool).   Terminal ileum: The mucosa appeared normal.  Cecum: The mucosa appeared normal.  Ascending colon: The mucosa appeared normal. Retroflexion was done to examine the mucosa.  Transverse colon: The mucosa appeared normal.  Descending colon: The mucosa appeared normal.  Sigmoid colon: The mucosa appeared normal.  Rectum: The mucosa appeared normal.  Anorectal:  Normal.      INTERVENTIONS     Procedure(s):  COLONOSCOPY - MAC      None     SPECIMEN: No specimens collected         PROCEDURE DATA     ANESTHESIA TYPE:    Monitored Anesthesia Care  CRNA: Ho Winkler CRNA      GI STAFF:  Nurse: Antonella Zambrano RN  GI Tech: ST Dave      EVENTS:   Event Tracking     Panel 1     Procedure : COLONOSCOPY - MAC      Event Time In    Procedure Start 0840    Procedure End 0907     Initial Clinical Review    Admission: Date/Time/Statement:   Admission Orders (From admission, onward)     Ordered        08/13/21 1325  Place in Observation  Once                   Orders Placed This Encounter   Procedures    Place in Observation     Standing Status:   Standing     Number of Occurrences:   1     Order Specific Question:   Level of Care     Answer:   Med Surg [16]     ED Arrival Information     Expected Arrival Acuity    - 8/13/2021 09:55 Emergent         Means of arrival Escorted by Service Admission type    [facility] Medicine Emergency         Arrival complaint    insect bite - cellulitis        Chief Complaint   Patient presents with    Allergic Reaction     pt reports at 0915 she got "bit"  patient is unaware of what bit her  states she has burning systemically  Initial Presentation: 41-year-old female with a past medical history of GERD, abnormal ureter in bleeding, and migraines who presents to [facility] ED this morning after experiencing an anaphylactic reaction  She was on her way to get her nails done at around 9:05 a m  When she felt a sting by some kind of bug on her right anterior forearm  Over the course of 10-15 minutes, she began to feel pruritus, numbness in her extremities, and noticed a diffuse urticarial rash all over her body  Over time, her face and extremities began to swell up, at which point she called her son to take her to the emergency department  She never experienced any throat tightness or difficulty finding her breath  Her only known allergies was to bleach when she was a teenager  No seasonal allergies, and this is never happened before  She has gotten stung by a bee in the past, but that never led to an anaphylactic reaction      Upon presentation to the ED, the patient had a blood pressure of 86/47    She received 2 L boluses of IV isolyte, 2 doses of epinephrine (1st 0 5 mg IV, 2nd 0 3 IV mg 2 hours later), IV dexamethasone, and IV        Scope In: 0840   At Cecum: 0847  Scope Out: 0907  Scope Withdrawal Time: 19.42    Assistants: None  Assistant Tasks: Not applicable    IMPLANTS: None    DESCRIPTION OF PROCEDURE:   Consent was obtained from the patient for the procedure. Benefits, possible complications and alternatives were explained in detail. He voiced understanding and agreed to proceed with the examination.  Electrocardiogram, pulse, pulse oximetry, and blood pressure were continuously monitored. The patient was turned to the left lateral position.  A verbal time out was done and patient was sedated to a comfortable level.    Rectal exam was performed.  The Olympus colonoscope was inserted into the rectum and advanced under direct visualization to the terminal ileum. Cecum was intubated and identified by ICV and appendiceal orifice. The procedure was considered not difficult. The colonoscope was withdrawn and careful examination of the colon was performed. Care was taken to expose and inspect the proximal sides of the ICV, and of haustral folds, flexures, and rectal valves. Retroflexion was performed before the scope was withdrawn. Appropriate photo documentation was obtained.  The patient tolerated the procedure well and was transferred to the recovery room in a stable condition.    PROCEDURE MEDICATIONS: Please refer to nursing / anesthesiology records for sedation in Epic.  COMPLICATIONS: None.   ESTIMATED BLOOD LOSS: < 5ML         famotidine  Since receiving all those medications, the patient's symptoms have mostly resolved and now she just feels fatigued  Blood pressure increased to 104/58 and rash resolved  She will be admitted to the general medicine service for overnight observation and managed for anaphylaxis due to insect bite       Anaphylaxis  Assessment & Plan  Patient has no prior history of anaphylaxis  Presents to the ED this admission with evidence of anaphylaxis due to insect bite    S/p 2 doses IV epinephrine, 2 L isolyte boluses, 1 dose IV dexamethasone, 1 dose IV famotidine, 1 dose IV Benadryl     Currently hemodynamically stable with sinus tachycardia     --aggressive volume expansion--> will give 1 L bolus of normal saline + IVF NS 200cc/h  * normal saline is the preferred isotonic fluid an anaphylactic reactions and should be used moving forward  --epinephrine IV p r n  for any further anaphylaxis  --benadryl p r n  for any further symptoms of pruritus and rash  --will give a dose of methylprednisolone 1mg/kg IV tomorrow to help prevent biphasic reaction  --discharge with EpiPen to take home  --on discharge provide follow-up for an allergist's referral for further testing        ED Triage Vitals   Temperature Pulse Respirations Blood Pressure SpO2   08/13/21 1057 08/13/21 1055 08/13/21 1055 08/13/21 1057 08/13/21 1055   97 9 °F (36 6 °C) 78 18 (!) 86/47 100 %      Temp Source Heart Rate Source Patient Position - Orthostatic VS BP Location FiO2 (%)   08/13/21 1057 08/13/21 1055 08/13/21 1120 08/13/21 1055 --   Oral Monitor Lying Right arm       Pain Score       08/13/21 1600       No Pain          Wt Readings from Last 1 Encounters:   04/29/21 62 5 kg (137 lb 12 8 oz)     Additional Vital Signs:   Date/Time  Temp  Pulse  Resp  BP  MAP (mmHg)  SpO2  O2 Device  Patient Position - Orthostatic VS   08/14/21 0759  99 1 °F (37 3 °C)  73  16  115/66  82  96 %  --  --   08/14/21 00:57:30  98 4 °F (36 9 °C)  80  20  113/55  74 97 %  None (Room air)  Lying   08/13/21 22:48:57  98 °F (36 7 °C)  92  20  112/57  75  96 %  --  --   08/13/21 1948  --  102  --  --  --  95 %  --  --   08/13/21 1947  --  101  --  --  --  96 %  --  --   08/13/21 1946  --  103  --  --  --  98 %  --  --   08/13/21 1945  --  104  --  127/60  82  96 %  --  --   08/13/21 1944  99 1 °F (37 3 °C)  102  --  --  --  97 %  --  --       Pertinent Labs/Diagnostic Test Results:       Results from last 7 days   Lab Units 08/14/21  0513 08/13/21  1706   WBC Thousand/uL 18 30*  --    HEMOGLOBIN g/dL 12 2  --    HEMATOCRIT % 36 5  --    PLATELETS Thousands/uL 221 240   NEUTROS ABS Thousands/µL 16 19*  --          Results from last 7 days   Lab Units 08/14/21  0513   SODIUM mmol/L 140   POTASSIUM mmol/L 3 8   CHLORIDE mmol/L 116*   CO2 mmol/L 20*   ANION GAP mmol/L 4   BUN mg/dL 9   CREATININE mg/dL 0 48*   EGFR ml/min/1 73sq m 119   CALCIUM mg/dL 8 1*             Results from last 7 days   Lab Units 08/14/21  0513   GLUCOSE RANDOM mg/dL 131     ED Treatment:   Medication Administration from 08/13/2021 0955 to 08/13/2021 1414       Date/Time Order Dose Route Action     08/13/2021 1108 diphenhydrAMINE (BENADRYL) injection 50 mg 50 mg Intravenous Given     08/13/2021 1108 famotidine (PEPCID) injection 20 mg 20 mg Intravenous Given     08/13/2021 1111 EPINEPHrine PF (ADRENALIN) 1 mg/mL injection 0 5 mg 0 5 mg Intramuscular Given     08/13/2021 1241 multi-electrolyte (ISOLYTE-S PH 7 4) bolus 1,000 mL 0 mL Intravenous Stopped     08/13/2021 1107 multi-electrolyte (ISOLYTE-S PH 7 4) bolus 1,000 mL 1,000 mL Intravenous New Bag     08/13/2021 1113 dexamethasone (DECADRON) injection 10 mg 10 mg Intravenous Given     08/13/2021 1242 multi-electrolyte (ISOLYTE-S PH 7 4) bolus 1,000 mL 1,000 mL Intravenous New Bag     08/13/2021 1305 EPINEPHrine PF (ADRENALIN) 1 mg/mL injection 0 3 mg 0 3 mg Intramuscular Given        Past Medical History:   Diagnosis Date    GERD (gastroesophageal reflux disease)     Migraine     takes excederine migraine       Admitting Diagnosis: Cellulitis [L03 90]  Anaphylaxis, initial encounter [T78  2XXA]  Age/Sex: 40 y o  female  Admission Orders:  Scheduled Medications:  enoxaparin, 40 mg, Subcutaneous, Daily  lidocaine, 1 patch, Topical, Daily  methylPREDNISolone sodium succinate, 60 mg, Intravenous, Once  pantoprazole, 40 mg, Oral, Early Morning      Continuous IV Infusions:  sodium chloride, 200 mL/hr, Intravenous, Continuous      PRN Meds:  acetaminophen, 650 mg, Oral, Q6H PRN  butalbital-acetaminophen-caffeine, 1 tablet, Oral, Q6H PRN  diphenhydrAMINE, 50 mg, Intravenous, Q6H PRN  EPINEPHrine PF, 0 3 mg, Intramuscular, Once PRN        Network Utilization Review Department  ATTENTION: Please call with any questions or concerns to [phone] and carefully listen to the prompts so that you are directed to the right person  All voicemails are confidential   Tiki Mcguire all requests for admission clinical reviews, approved or denied determinations and any other requests to dedicated fax number below belonging to the campus where the patient is receiving treatment   List of dedicated fax numbers for the Facilities:  [address] DENIALS (Administrative/Medical Necessity) [phone]   [address] (Maternity/NICU/Pediatrics) [phone]   [address] [place] [address] [address] [place] [address] [address]eeshan Francis [address] [facility] [address] [facility] [phone]

## 2023-03-13 NOTE — PROGRESS NOTES
PT Evaluation     Today's date: 3/13/2023  Patient name: Teresita De Leon  : 1975  MRN: [id number]  Referring provider: Elizabeth Carson, PT  Dx:   Encounter Diagnosis     ICD-10-CM    1  Acute left-sided low back pain, unspecified whether sciatica present  M54 50 Ambulatory referral to PT spine                     Assessment  Assessment details: Teresita De Leon is a pleasant 56 y o  female who presents for evaluation of low back pain via the [facility]  The patient's greatest concerns are improving her tolerance for standing for work, improving her ability to play with and care for her son  Primary movement impairment diagnosis of LE weakness, trunk neuromuscular control deficit, and lumbar spine hypomobility  No further referral appears necessary at this time based upon examination results  Physical exam is consistent with stabilization treatment group for low back pain  Patient responds negatively to repeated movements testing in all planes, reporting an increase in low back pain and L posterior hip pain  Lower quarter screen is negative, but there is a positive passive SLR which suggests neural tension may be a component of treatment in the future  She responds positively to HEP that suggest she will respond positively to trunk and hip strengthening exercises to improve symptoms  She demonstrates improvement in pain level and lumbar spine flexion AROM at the end of tx  Pt  will benefit from skilled PT services that includes manual therapy techniques to enhance tissue extensibility, neuromuscular re-education to facilitate motor control, therapeutic exercise to increase functional mobility, and modalities prn to reduce pain and inflammation      Impairments: abnormal muscle firing, abnormal muscle tone, abnormal or restricted ROM, abnormal movement, activity intolerance, impaired physical strength, lacks appropriate home exercise program, pain with function and poor body mechanics    Symptom irritability: moderateUnderstanding of Dx/Px/POC: good   Prognosis: good    Goals  Impairment based goals  Patient will achieve full lumbar spine AROM  Patient will demonstrate >4/5 LE MMT  Function based goals  Patient will be independent in comprehensive HEP upon discharge  Patient will achieve goal FOTO score upon discharge  Patient will report no limitation in sitting tolerance  Patient will play with her son without limitation  Plan  Patient would benefit from: skilled physical therapy  Planned therapy interventions: activity modification, joint mobilization, manual therapy, motor coordination training, neuromuscular re-education, patient education, self care, therapeutic activities, therapeutic exercise, graded activity, home exercise program, behavior modification, graded exercise, functional ROM exercises and strengthening  Frequency: 2x week  Duration in weeks: 8  Treatment plan discussed with: patient        Subjective Evaluation    History of Present Illness  Mechanism of injury: Patient presents with a 1 month history of low back pain  Today she reports low back pain and L lateral hip pain  She denies further radicular  Symptoms are provoked by extended periods of sitting, and getting out of bed first thing in the morning  She has been taking muscle relaxant with some success at night  She works as a  at [employer] and will [place] against the counter to alleviate pain  She did have x-rays taken by her family doctor     Pain  Current pain ratin  At best pain rating: 3  At worst pain ratin  Quality: tight and dull ache  Aggravating factors: sitting, standing and walking    Patient Goals  Patient goals for therapy: increased strength, independence with ADLs/IADLs, increased motion and decreased pain          Objective     General Comments:      Lumbar Comments  Gait: WNL    Standing Posture: mild loss of lumbar lordosis    Red Flags: none    Lower Quarter Screen: unremarkable    Manual Muscle Testing:   Hip Flexion (SLR):  R  4/5   L  3+/5 p! Hip Extension:   R  3+/5  L  3/5 p! Hip Abduction:  R  4/5   L  3/5 p! Hip IR: R  4/5   L 3+/5 p! Hip ER: R  4/5   L  3+/5 p! Palpation Assessment: TTP lumbar spine CPA; TTP B/L lumbar paraspinals    Range of Motion:   Lumbar Spine Flexion: hands to malleoli level; central low back pain and L hip pain   Lumbar Spine Extension: 25% limited; central low back pain   Lumbar Spine SG: L WNL; p!  R 50% limited; p!   Hip Flexion: L 90 deg p!  R WNL  Hip ER @ 90: L 30 deg p!  R WNL    Special Tests   Hip Scour: neg L  LINH: pos L  Passive SLR: pos L    Repeated Movements:     RFIS: increase; worse (central)   RFIL: increase; worse (central)   FANTASMA: increase; worse (central)   REIL: increase; worse (central)               Precautions: none      Manuals 3/13                                       Neuro Re-Ed        Bridge HEP       Hooklying alt clamshell Btb; HEP       Standing hip abd/ext        Unilateral row        Palloff press        Seated goodmoradria @ meenu                Education HEP and POC       Ther Ex        TM walk        LTR HEP       PBall FWD rollout        Prone on elbows                                        Ther Activity        STS        Deadlift        Suitcase carry                        Gait Training                        Modalities 62yo F presents with SBO. NG tube CLWS, serial xrays in AM, replete K+, continue IV fluids.    CAPRINI SCORE [CLOT]    AGE RELATED RISK FACTORS                                                       MOBILITY RELATED FACTORS  [x ] Age 41-60 years                                            (1 Point)                  [ ] Bed rest                                                        (1 Point)  [ ] Age: 61-74 years                                           (2 Points)                 [ ] Plaster cast                                                   (2 Points)  [ ] Age= 75 years                                              (3 Points)                 [ ] Bed bound for more than 72 hours                 (2 Points)    DISEASE RELATED RISK FACTORS                                               GENDER SPECIFIC FACTORS  [ ] Edema in the lower extremities                       (1 Point)                  [ ] Pregnancy                                                     (1 Point)  [ ] Varicose veins                                               (1 Point)                  [ ] Post-partum < 6 weeks                                   (1 Point)             [ ] BMI > 25 Kg/m2                                            (1 Point)                  [ ] Hormonal therapy  or oral contraception          (1 Point)                 [ ] Sepsis (in the previous month)                        (1 Point)                  [ ] History of pregnancy complications                 (1 point)  [ ] Pneumonia or serious lung disease                                               [ ] Unexplained or recurrent                     (1 Point)           (in the previous month)                               (1 Point)  [ ] Abnormal pulmonary function test                     (1 Point)                 SURGERY RELATED RISK FACTORS  [ ] Acute myocardial infarction                              (1 Point)                 [x ]  Section                                             (1 Point)  [ ] Congestive heart failure (in the previous month)  (1 Point)               [ ] Minor surgery                                                  (1 Point)   [ ] Inflammatory bowel disease                             (1 Point)                 [ ] Arthroscopic surgery                                        (2 Points)  [ ] Central venous access                                      (2 Points)                [ ] General surgery lasting more than 45 minutes   (2 Points)       [ ] Stroke (in the previous month)                          (5 Points)               [ ] Elective arthroplasty                                         (5 Points)                                                                                                                                               HEMATOLOGY RELATED FACTORS                                                 TRAUMA RELATED RISK FACTORS  [ ] Prior episodes of VTE                                     (3 Points)                [ ] Fracture of the hip, pelvis, or leg                       (5 Points)  [ ] Positive family history for VTE                         (3 Points)                 [ ] Acute spinal cord injury (in the previous month)  (5 Points)  [ ] Prothrombin 72783 A                                     (3 Points)                 [ ] Paralysis  (less than 1 month)                             (5 Points)  [ ] Factor V Leiden                                             (3 Points)                  [ ] Multiple Trauma within 1 month                        (5 Points)  [ ] Lupus anticoagulants                                     (3 Points)                                                           [ ] Anticardiolipin antibodies                               (3 Points)                                                       [ ] High homocysteine in the blood                      (3 Points)                                             [ ] Other congenital or acquired thrombophilia      (3 Points)                                                [ ] Heparin induced thrombocytopenia                  (3 Points)                                          Total Score [  2        ]

## 2023-03-20 ENCOUNTER — OFFICE VISIT (OUTPATIENT)
Dept: PHYSICAL THERAPY | Facility: REHABILITATION | Age: 48
End: 2023-03-20

## 2023-03-20 DIAGNOSIS — M54.50 ACUTE LEFT-SIDED LOW BACK PAIN, UNSPECIFIED WHETHER SCIATICA PRESENT: Primary | ICD-10-CM

## 2023-03-20 NOTE — PROGRESS NOTES
Daily Note     Today's date: 3/20/2023  Patient name: Jai Webster  : 1975  MRN: [id number]  Referring provider: Kristine Monahan, PT  Dx:   Encounter Diagnosis     ICD-10-CM    1  Acute left-sided low back pain, unspecified whether sciatica present  M54 50                      Subjective: Patient has been compliant with HEP  She reports overall improvement in symptoms; less frequent and less intense pain and more mobility  Objective: See treatment diary below      Assessment: Patient is able to achieve full lumbar flexion AROM without pain  Patient is challenged by progression in interventions  Patient is fatigued at end of tx, without an increase in low back pain symptoms  Continue to progress with functional movements of deadlift and carry next visit  HEP updated as charted  Patient would benefit from continued PT  Plan: Continue per plan of care        Precautions: none      Manuals 3/13 3/20      Theragun  TB (end of tx)                              Neuro Re-Ed        Bridge HEP       Hooklying alt clamshell Btb; HEP       Standing hip abd  2x10 ea; rtb; HEP      Unilateral row w/ twist  2x10 ea; 15#      Palloff press  2x10 ea; 10#      Seated goodmorning w/ row @ meenu  2x10; 25#              Education HEP and POC       Ther Ex        TM walk  8 min      LTR HEP       PBall FWD rollout        EIL w/ sag  2x10; HEP                                      Ther Activity        STS  3x10; 10#; HEP      Deadlift   *     Suitcase carry   *                     Gait Training                        Modalities

## 2023-03-21 ENCOUNTER — TELEPHONE (OUTPATIENT)
Dept: OBGYN CLINIC | Facility: CLINIC | Age: 48
End: 2023-03-21

## 2023-03-21 NOTE — TELEPHONE ENCOUNTER
----- Message from Myrla Leyden, MD sent at 3/21/2023  9:55 AM EDT -----  Pelvic ultrasound without evidence of structural cause of pelvic pain  Stable small fibroid unlikely to be contributing

## 2023-03-22 ENCOUNTER — OFFICE VISIT (OUTPATIENT)
Dept: PHYSICAL THERAPY | Facility: REHABILITATION | Age: 48
End: 2023-03-22

## 2023-03-22 DIAGNOSIS — M54.50 ACUTE LEFT-SIDED LOW BACK PAIN, UNSPECIFIED WHETHER SCIATICA PRESENT: Primary | ICD-10-CM

## 2023-03-22 NOTE — PROGRESS NOTES
Daily Note     Today's date: 3/22/2023  Patient name: Forrest Robert   : 1975  MRN: [id number]  Referring provider: Nely Ko, PT  Dx:   Encounter Diagnosis     ICD-10-CM    1  Acute left-sided low back pain, unspecified whether sciatica present  M54 50                      Subjective: Patient reports some muscular soreness after last tx that last approx 1 day  Objective: See treatment diary below      Assessment: Patient is challenged by exercise volume as charted  She requires rest breaks throughout tx  No addition of deadlift due to fatigue and difficulty with exercise volume  Patient would benefit from continued PT  Plan: Continue per plan of care        Precautions: none      Manuals 3/13 3/20 3/22     Theragun  TB (end of tx)                              Neuro Re-Ed        Bridge HEP       Hooklying alt clamshell Btb; HEP       Standing hip abd  2x10 ea; rtb; HEP      Unilateral row w/ twist  2x10 ea; 15# 2x10 ea; 15#     Palloff press  2x10 ea; 10# 2x10 ea; 10#     Seated goodmorning w/ row @ meenu  2x10; 25# 20x; 25#     Reverse hyper   3x10; B/L                     Education HEP and POC       Ther Ex        TM walk  8 min 8 min     LTR HEP       PBall FWD rollout        EIL w/ sag  2x10; HEP 2x10                                     Ther Activity        STS  3x10; 10#; HEP      Deadlift    *    Suitcase carry   3 laps ea; 25#     Seated diagonal lift   2x10 ea; 12#             Gait Training                        Modalities

## 2023-03-27 ENCOUNTER — OFFICE VISIT (OUTPATIENT)
Dept: OBGYN CLINIC | Facility: CLINIC | Age: 48
End: 2023-03-27

## 2023-03-27 ENCOUNTER — OFFICE VISIT (OUTPATIENT)
Dept: PHYSICAL THERAPY | Facility: REHABILITATION | Age: 48
End: 2023-03-27

## 2023-03-27 VITALS
RESPIRATION RATE: 18 BRPM | WEIGHT: 150.4 LBS | SYSTOLIC BLOOD PRESSURE: 115 MMHG | HEIGHT: 57 IN | HEART RATE: 112 BPM | DIASTOLIC BLOOD PRESSURE: 74 MMHG | BODY MASS INDEX: 32.45 KG/M2

## 2023-03-27 DIAGNOSIS — K21.9 GASTROESOPHAGEAL REFLUX DISEASE, UNSPECIFIED WHETHER ESOPHAGITIS PRESENT: ICD-10-CM

## 2023-03-27 DIAGNOSIS — M54.50 ACUTE LEFT-SIDED LOW BACK PAIN, UNSPECIFIED WHETHER SCIATICA PRESENT: Primary | ICD-10-CM

## 2023-03-27 RX ORDER — OMEPRAZOLE 40 MG/1
40 CAPSULE, DELAYED RELEASE ORAL DAILY
Qty: 30 CAPSULE | Refills: 0 | Status: SHIPPED | OUTPATIENT
Start: 2023-03-27

## 2023-03-27 NOTE — PROGRESS NOTES
Daily Note     Today's date: 3/27/2023  Patient name: Harris Zimmerman   : 1975  MRN: [id number]  Referring provider: Ga Humphreys, PT  Dx:   Encounter Diagnosis     ICD-10-CM    1  Acute left-sided low back pain, unspecified whether sciatica present  M54 50                      Subjective: Patient reports no increase in soreness after last tx  She states that her back has been feeling better, less pain with heavy activities and less pain with sleeping  Objective: See treatment diary below      Assessment: Treatment is progressed by increasing the amount of resistance for meenu exercises  Patient tolerates these progressions well, reporting no increase in symptoms throughout tx  Patient would benefit from continued PT  Plan: Continue per plan of care        Precautions: none      Manuals 3/13 3/20 3/22 3/27    Theragun  TB (end of tx)                              Neuro Re-Ed        Bridge HEP       Hooklying alt clamshell Btb; HEP       Standing hip abd  2x10 ea; rtb; HEP      Unilateral row w/ twist  2x10 ea; 15# 2x10 ea; 15# 2x10 ea; 18#    Palloff press  2x10 ea; 10# 2x10 ea; 10# 2x10 ea; 12#    Seated goodmorning w/ row @ meenu  2x10; 25# 20x; 25# 20x; 28#    B/L Reverse hyper   3x10 3x10                    Education HEP and POC       Ther Ex        TM walk  8 min 8 min 8 min    LTR HEP       PBall FWD rollout        EIL w/ sag  2x10; HEP 2x10 2x10                                    Ther Activity        STS  3x10; 10#; HEP      Deadlift        Suitcase carry   3 laps ea; 25# 3 laps ea; 25#    Seated diagonal lift   2x10 ea; 12# 2x10 ea; 12#            Gait Training                        Modalities

## 2023-03-27 NOTE — PROGRESS NOTES
"[address] [place] [address]  FOLLOW UP VISIT    SUBJECTIVE:  HPI: Lena Erazo is a 56 y o  [id number] female who presents for follow up of pelvic pain  At last visit on 2/27/23, reported 1 month history of intermittent, left side and lower back pain that wrapped around left flank  She is using Depo for contraception  Her surgical history includes hysteroscopy, D&C, IUD removal, and ablation on 4/1/22  Pelvic US was ordered and she was instructed to follow up with her PCP as well as comprehensive spine program      Today she reports her pain is improved  She has been going to physical therapy  Denies pelvic pain, vaginal bleeding, vaginal discharge, dysuria, abnormal BM  We reviewed her largely normal pelvic US results (see below for report)  OBJECTIVE:  Vitals:    03/27/23 1304   BP: 115/74   BP Location: Left arm   Patient Position: Sitting   Cuff Size: Adult   Pulse: (!) 112   Resp: 18   Weight: 68 2 kg (150 lb 6 4 oz)   Height: 4' 9\" (1 448 m)       Physical Exam  Constitutional:       General: She is not in acute distress  Appearance: She is well-developed  HENT:      Head: Normocephalic and atraumatic  Eyes:      Conjunctiva/sclera: Conjunctivae normal    Cardiovascular:      Rate and Rhythm: Normal rate and regular rhythm  Heart sounds: Normal heart sounds  Pulmonary:      Effort: Pulmonary effort is normal       Breath sounds: Normal breath sounds  Abdominal:      General: There is no distension  Palpations: Abdomen is soft  Tenderness: There is no abdominal tenderness  Genitourinary:     Comments: Deferred   Musculoskeletal:         General: Normal range of motion  Skin:     General: Skin is warm and dry  Neurological:      General: No focal deficit present  Mental Status: She is alert and oriented to person, place, and time  Mental status is at baseline     Psychiatric:         Mood and Affect: Mood normal          Behavior: Behavior normal          " Thought Content: Thought content normal           US pelvis complete w transvaginal 3/6/23    FINDINGS:  UTERUS:  The uterus is anteverted in position, measuring 7 5 x 3 7 x 4 7 cm  Posterior fundal leiomyoma measures 2 0 x 1 7 x 2 0 cm which, when allowing for differences in measuring technique, is unchanged from June 6, 2022  The thyroid has a submucosal component  The cervix appears within normal limits      ENDOMETRIUM:    The endometrial echo complex has an AP caliber of 5 0 mm  Its appearance is within normal limits for age and cycle and shows no filling defects        OVARIES/ADNEXA:  Right ovary:  1 7 x 2 3 x 1 1 cm  2 2 mL  Left ovary:  1 6 x 0 9 x 1 3 cm  1 0 mL  Ovarian Doppler flow is within normal limits  No suspicious ovarian or adnexal abnormality      OTHER:  No free fluid or loculated fluid collections      IMPRESSION:  Submucosal posterior fundal fibroid, proximally 2 cm, not significantly changed from June 2022      No new sonographic abnormality in the pelvis  ASSESSMENT:  Harris Zimmerman is a 56 y o  [id number] female who presents for follow up  Pain does not appear to be gyn related   Continue current plan for pain management with physical therapy and follow up with PCP    PLAN:  - RTC for annual    Ricki Galicia MD   PGY-4, OBGYN  03/27/23

## 2023-03-29 ENCOUNTER — OFFICE VISIT (OUTPATIENT)
Dept: PHYSICAL THERAPY | Facility: REHABILITATION | Age: 48
End: 2023-03-29

## 2023-03-29 DIAGNOSIS — M54.50 ACUTE LEFT-SIDED LOW BACK PAIN, UNSPECIFIED WHETHER SCIATICA PRESENT: Primary | ICD-10-CM

## 2023-03-29 NOTE — PROGRESS NOTES
Daily Note     Today's date: 3/29/2023  Patient name: Dawn Rossi   : 1975  MRN: [id number]  Referring provider: González Mcadams, PT  Dx:   Encounter Diagnosis     ICD-10-CM    1  Acute left-sided low back pain, unspecified whether sciatica present  M54 50                      Subjective: Patient reports no increase in soreness after last tx  She states that her back has been feeling better, less pain with heavy activities and less pain with sleeping  Objective: See treatment diary below      Assessment: Treatment progressed with addition of deadlift and standing trunk rotation w/ long bar  This is challenging for patient, fatigued at end of tx  Some low back discomfort was elicited during deadlift exercise  Patient would benefit from continued PT  Plan: Continue per plan of care        Precautions: none      Manuals 3/13 3/20 3/22 3/27 3/29   Theragun  TB (end of tx)                              Neuro Re-Ed        Bridge HEP       Hooklying alt clamshell Btb; HEP       Standing hip abd  2x10 ea; rtb; HEP      Unilateral row w/ twist  2x10 ea; 15# 2x10 ea; 15# 2x10 ea; 18# 2x10 ea; 18#   Palloff press  2x10 ea; 10# 2x10 ea; 10# 2x10 ea; 12#    Seated goodmorning w/ row @ meenu  2x10; 25# 20x; 25# 20x; 28# 20x; 30#   B/L Reverse hyper   3x10 3x10 3x10                   Education HEP and POC       Ther Ex        TM walk  8 min 8 min 8 min 8 min   LTR HEP       PBall FWD rollout        EIL w/ sag  2x10; HEP 2x10 2x10 2x10   Standing trunk rotation w/ long bar     2x10 ea; 9#                           Ther Activity        STS  3x10; 10#; HEP      Deadlift     3x5; 25#; 6in step   Suitcase carry   3 laps ea; 25# 3 laps ea; 25# 3 laps ea; 25#   Seated diagonal lift   2x10 ea; 12# 2x10 ea; 12#            Gait Training                        Modalities

## 2023-04-03 ENCOUNTER — OFFICE VISIT (OUTPATIENT)
Dept: PHYSICAL THERAPY | Facility: REHABILITATION | Age: 48
End: 2023-04-03

## 2023-04-03 DIAGNOSIS — M54.50 ACUTE LEFT-SIDED LOW BACK PAIN, UNSPECIFIED WHETHER SCIATICA PRESENT: Primary | ICD-10-CM

## 2023-04-03 NOTE — PROGRESS NOTES
Daily Note     Today's date: 4/3/2023  Patient name: Gibran Vargas   : 1975  MRN: [id number]  Referring provider: Pricilla Cid, PT  Dx:   Encounter Diagnosis     ICD-10-CM    1  Acute left-sided low back pain, unspecified whether sciatica present  M54 50                      Subjective: Patient reports good tolerance to last tx  She reports no issues with back pain over the weekend  Objective: See treatment diary below      Assessment: Patient tolerates progressive trunk strengthening well; no increase in symptoms throughout tx  Patient would benefit from continued PT  Plan: Continue per plan of care        Precautions: none      Manuals 3/20 3/22 3/27 3/29 4/3   Theragun TB (end of tx)                               Neuro Re-Ed        [facility]        Standing hip abd 2x10 ea; rtb; HEP       Unilateral row w/ twist 2x10 ea; 15# 2x10 ea; 15# 2x10 ea; 18# 2x10 ea; 18# 2x10 ea; 18#   Palloff press 2x10 ea; 10# 2x10 ea; 10# 2x10 ea; 12#     Seated goodmorning w/ row @ meenu 2x10; 25# 20x; 25# 20x; 28# 20x; 30#    B/L Reverse hyper  3x10 3x10 3x10 3x10                   Education        Ther Ex        TM walk 8 min 8 min 8 min 8 min 8 min   LTR        PBall FWD rollout        EIL w/ sag 2x10; HEP 2x10 2x10 2x10 2x10   Standing trunk rotation w/ long bar    2x10 ea; 9# 2x10 ea; 9#   Trunk SB against weight     3x5 ea; 25#                   Ther Activity        STS 3x10; 10#; HEP       Deadlift    3x5; 25#; 6in step 3x5; 25#; 6in step   Suitcase carry  3 laps ea; 25# 3 laps ea; 25# 3 laps ea; 25# 3 laps ea; 25#   Seated diagonal lift  2x10 ea; 12# 2x10 ea; 12#             Gait Training                        Modalities

## 2023-04-05 ENCOUNTER — APPOINTMENT (OUTPATIENT)
Dept: PHYSICAL THERAPY | Facility: REHABILITATION | Age: 48
End: 2023-04-05

## 2023-04-05 NOTE — PROGRESS NOTES
Daily Note     Today's date: 2023  Patient name: Anuj Ramos   : 1975  MRN: [id number]  Referring provider: Vamsi Forrest, PT  Dx:   No diagnosis found  Subjective: Patient reports good tolerance to last tx  She reports no issues with back pain over the weekend  Objective: See treatment diary below    Manual Muscle Testing:   Hip Flexion (SLR):  R  4/5   L  3+/5 p! Hip Extension:   R  3+/5  L  3/5 p! Hip Abduction:  R  4/5   L  3/5 p! Hip IR: R  4/5   L 3+/5 p! Hip ER: R  4/5   L  3+/5 p! Palpation Assessment: TTP lumbar spine CPA; TTP B/L lumbar paraspinals    Range of Motion:   Lumbar Spine Flexion: hands to malleoli level; central low back pain and L hip pain   Lumbar Spine Extension: 25% limited; central low back pain   Lumbar Spine SG: L WNL; p!  R 50% limited; p! Hip Flexion: L 90 deg p!  R WNL  Hip ER @ 90: L 30 deg p!  R WNL      Assessment: Anuj Ramos has been compliant with attending PT and home exercise program since initial eval   [facility]  has made improvements in objective data since initial evalulation and has achieved all goals  Patient reports having returned to their prior level or function  Patient provided with updated Home Exercise Program, all questions answered, verbalized understanding and agreement to plan of care  Thus it was mutually decided to discontinue this episode of care and transition to Home Exercise Program     Goals  Impairment based goals  Patient will achieve full lumbar spine AROM  Patient will demonstrate >4/5 LE MMT  Function based goals  Patient will be independent in comprehensive HEP upon discharge  Patient will achieve goal FOTO score upon discharge  Patient will report no limitation in sitting tolerance  Patient will play with her son without limitation  Plan: D/C to HEP        Precautions: none      Manuals 3/20 3/22 3/27 3/29 4/3 4/5   Theragun TB (end of tx)                                   Neuro Re-Ed         Bridge         Hooklying alt clamshell         Standing hip abd 2x10 ea; rtb; HEP        Unilateral row w/ twist 2x10 ea; 15# 2x10 ea; 15# 2x10 ea; 18# 2x10 ea; 18# 2x10 ea; 18#    Palloff press 2x10 ea; 10# 2x10 ea; 10# 2x10 ea; 12#      Seated goodmorning w/ row @ meenu 2x10; 25# 20x; 25# 20x; 28# 20x; 30#     B/L Reverse hyper  3x10 3x10 3x10 3x10                      Education         Ther Ex         TM walk 8 min 8 min 8 min 8 min 8 min    LTR         PBall FWD rollout         EIL w/ sag 2x10; HEP 2x10 2x10 2x10 2x10    Standing trunk rotation w/ long bar    2x10 ea; 9# 2x10 ea; 9#    Trunk SB against weight     3x5 ea; 25#                      Ther Activity         STS 3x10; 10#; HEP        Deadlift    3x5; 25#; 6in step 3x5; 25#; 6in step    Suitcase carry  3 laps ea; 25# 3 laps ea; 25# 3 laps ea; 25# 3 laps ea; 25#    Seated diagonal lift  2x10 ea; 12# 2x10 ea; 12#               Gait Training                           Modalities

## 2023-05-03 ENCOUNTER — HOSPITAL ENCOUNTER (OUTPATIENT)
Dept: RADIOLOGY | Age: 48
Discharge: HOME | End: 2023-05-03

## 2023-05-03 VITALS — HEIGHT: 57 IN | WEIGHT: 150 LBS | BODY MASS INDEX: 32.36 KG/M2

## 2023-05-03 DIAGNOSIS — Z12.31 SCREENING MAMMOGRAM FOR BREAST CANCER: ICD-10-CM

## 2023-05-12 ENCOUNTER — CLINICAL SUPPORT (OUTPATIENT)
Dept: OBGYN CLINIC | Facility: CLINIC | Age: 48
End: 2023-05-12

## 2023-05-12 DIAGNOSIS — Z30.42 ENCOUNTER FOR MANAGEMENT AND INJECTION OF DEPO-PROVERA: ICD-10-CM

## 2023-05-12 RX ADMIN — MEDROXYPROGESTERONE ACETATE 150 MG: 150 INJECTION, SUSPENSION INTRAMUSCULAR at 14:33

## 2023-05-12 NOTE — PROGRESS NOTES
Depo-Provera     • [x]   Patient provided box yes  o 1 Refills remain  o Refills submitted no  • Last  Annual Date / Birth control check : 8/2/2022  • Last Depo date: 2/24/2023  • Side effects: no  • HCG: no  • Given by: Oscar Flores  • Site: LD    o Calcium supplement daily teaching  o Condoms for 2 weeks following first injection dose

## 2023-05-18 ENCOUNTER — CONSULT (OUTPATIENT)
Dept: GASTROENTEROLOGY | Facility: CLINIC | Age: 48
End: 2023-05-18

## 2023-05-18 VITALS
DIASTOLIC BLOOD PRESSURE: 76 MMHG | HEIGHT: 57 IN | TEMPERATURE: 98.6 F | HEART RATE: 77 BPM | BODY MASS INDEX: 31.93 KG/M2 | SYSTOLIC BLOOD PRESSURE: 110 MMHG | WEIGHT: 148 LBS

## 2023-05-18 DIAGNOSIS — A04.8 H. PYLORI INFECTION: ICD-10-CM

## 2023-05-18 DIAGNOSIS — Z12.11 SCREENING FOR COLON CANCER: ICD-10-CM

## 2023-05-18 DIAGNOSIS — K80.60 CALCULUS OF GALLBLADDER AND BILE DUCT WITH CHOLECYSTITIS WITHOUT OBSTRUCTION, UNSPECIFIED CHOLECYSTITIS ACUITY: ICD-10-CM

## 2023-05-18 DIAGNOSIS — R10.11 RIGHT UPPER QUADRANT ABDOMINAL PAIN: ICD-10-CM

## 2023-05-18 DIAGNOSIS — K21.9 GASTROESOPHAGEAL REFLUX DISEASE, UNSPECIFIED WHETHER ESOPHAGITIS PRESENT: Primary | ICD-10-CM

## 2023-05-18 DIAGNOSIS — R10.13 DYSPEPSIA: ICD-10-CM

## 2023-05-18 RX ORDER — OMEPRAZOLE 40 MG/1
40 CAPSULE, DELAYED RELEASE ORAL 2 TIMES DAILY
Qty: 30 CAPSULE | Refills: 0 | Status: SHIPPED | OUTPATIENT
Start: 2023-05-18

## 2023-05-18 RX ORDER — HYOSCYAMINE SULFATE 0.125 MG
0.12 TABLET ORAL EVERY 4 HOURS PRN
Qty: 30 TABLET | Refills: 0 | Status: SHIPPED | OUTPATIENT
Start: 2023-05-18

## 2023-05-18 NOTE — PROGRESS NOTES
[facility] - Outpatient Follow-up Note  Zuleika Benson 56 y o  female MRN: [id number]  Encounter: [id number]          ASSESSMENT AND PLAN:      1  Gastroesophageal reflux disease, unspecified whether esophagitis present  2  Dyspepsia  3  H  pylori infection  Patient has chronic reflux  Her main complaint is heartburn  She was a started on omeprazole, her symptoms are not fully controlled on 40 mg, will increase the dose  She was found to have H  pylori infection in [place], as per the patient this was treated and eradication was confirmed, records are not available, will obtain a stool antigen for H  Pylori  Patient is complaining about bloating  Given the presence of reflux and bloating we will rule out gastroparesis exacerbating her reflux    4  Calculus of gallbladder and bile duct with cholecystitis without obstruction, unspecified cholecystitis acuity  5  Right upper quadrant abdominal pain  Patient is complaining about right upper quadrant abdominal pain that can be associated with nausea  Underwent imaging in 2021, both US and CT records were personally reviewed  She is reported to have cholelithiasis on both  Patient has been currently on Levsin and this has been helping with her pain, will refill Levsin  Patient was explained likely her abdominal pain is secondary to cholelithiasis given the location at the right upper quadrant and nausea associated with the pain that starts right after eating  If gastroparesis is ruled out patient will be referred to surgery to consider cholecystectomy      6  Screening for colon cancer  - Patient is 56 y o , she underwent a recent colonoscopy about 2 years ago but was not performed for colon cancer screening purposes, records are not available since this was performed in [place], denies any family history of GI malignancy or IBD, no alarm symptoms at this point, currently having normal bowel movements    - Currently average risk for colonoscopy, other options for colorectal cancer screening were discussed with the patient, will review records from previous colonoscopy and will decide further screening methods based on this    ______________________________________________________________________    SUBJECTIVE:  Patient seen and examined, he is a 78-year-old female patient complaining about chronic abdominal pain, otherwise she denies any recent events, currently is tolerating PO route, vomiting, is passing flatus and having bowel movements described as Maury stool chart #4 bowel 3 times per day, she is complaining about right upper quadrant abdominal pain, she also can have epigastric pain, usually the pain is worse after eating and may be associated with nausea, she is currently on Levsin and PPI and her symptoms are relatively controlled, no recent weight loss      REVIEW OF SYSTEMS IS OTHERWISE NEGATIVE  Historical Information   Past Medical History:   Diagnosis Date   • GERD (gastroesophageal reflux disease)    • Migraine     takes excederine migraine     Past Surgical History:   Procedure Laterality Date   • DENTAL SURGERY     • ENDOMETRIAL ABLATION N/A 4/1/2022    Procedure: ABLATION ENDOMETRIAL Manuele Loveless;  Surgeon: Henrique Moore MD;  Location: [facility];  Service: Gynecology   • NM HYSTEROSCOPY BX ENDOMETRIUM&/POLYPC W/WO D&C N/A 4/1/2022    Procedure: DILATATION AND CURETTAGE (D&C) WITH HYSTEROSCOPY;  Surgeon: Henrique Moore MD;  Location: [facility];  Service: Gynecology   • NM REMOVAL INTRAUTERINE DEVICE IUD N/A 4/1/2022    Procedure: REMOVAL OF INTRAUTERINE DEVICE (IUD);   Surgeon: Henrique Moore MD;  Location[facility];  Service: Gynecology     Social History   Social History     Substance and Sexual Activity   Alcohol Use Yes    Comment: social     Social History     Substance and Sexual Activity   Drug Use No     Social History     Tobacco Use   Smoking Status Never   Smokeless Tobacco Never     Family History   Problem Relation Age of Onset   • Breast cancer Mother         60 y/o    • BRCA1 Negative Mother    • Lung cancer Mother 77   • Diabetes Father    • Hypertension Father    • No Known Problems Daughter    • Breast cancer Maternal Grandmother 46   • Lung cancer Maternal Grandmother 59   • Alzheimer's disease Maternal Grandfather    • Heart disease Paternal Grandmother    • No Known Problems Paternal Grandfather    • No Known Problems Brother    • No Known Problems Brother    • No Known Problems Brother    • No Known Problems Son    • No Known Problems Son    • No Known Problems Maternal Aunt    • No Known Problems Maternal Aunt    • No Known Problems Maternal Aunt    • No Known Problems Maternal Aunt    • Lung cancer Maternal Uncle 48   • No Known Problems Paternal Aunt    • No Known Problems Paternal Aunt    • No Known Problems Paternal Aunt    • No Known Problems Paternal Aunt    • Breast cancer Cousin         age dx unknown   • Throat cancer Cousin 39   • BRCA1 Positive Cousin    • Kidney cancer Cousin 39       Meds/Allergies       Current Outpatient Medications:   •  EPINEPHrine (EPIPEN) 0 3 mg/0 3 mL SOAJ  •  hyoscyamine (ANASPAZ,LEVSIN) 0 125 MG tablet  •  ibuprofen (MOTRIN) 600 mg tablet  •  omeprazole (PriLOSEC) 40 MG capsule  •  medroxyPROGESTERone (DEPO-PROVERA) 150 mg/mL injection  •  medroxyPROGESTERone acetate (DEPO-PROVERA SYRINGE) 150 mg/mL injection  •  methylPREDNISolone 4 MG tablet therapy pack  •  Multiple Vitamin (multivitamin) tablet  •  tiZANidine (ZANAFLEX) 4 mg tablet    Current Facility-Administered Medications:   •  medroxyPROGESTERone (DEPO-PROVERA) IM injection 150 mg, 150 mg, Intramuscular, Q3 Months, 150 mg at 05/12/23 1433    Allergies   Allergen Reactions   • Bee Venom Anaphylaxis   • Other Swelling     Patient states that she gets hives and swells when she comes in contact with clorox or bleach products             Objective     Blood pressure 110/76, pulse 77, temperature 98 6 °F (37 °C), "temperature source Tympanic, height 4' 9\" (1 448 m), weight 67 1 kg (148 lb), not currently breastfeeding  Body mass index is 32 03 kg/m²  PHYSICAL EXAM:      General Appearance:   Alert, cooperative, no distress   HEENT:   Normocephalic, atraumatic, anicteric  Neck:  Supple, symmetrical, trachea midline   Lungs:   Clear to auscultation bilaterally; no rales, rhonchi or wheezing; respirations unlabored    Heart[de-identified]   Regular rate and rhythm; no murmur, rub, or gallop  Abdomen:   Soft, non-tender, non-distended; normal bowel sounds; no masses, no organomegaly    Genitalia:   Deferred    Rectal:   Deferred    Extremities:  No edema    Lymph nodes:  No palpable cervical lymphadenopathy    Skin:  No jaundice, rashes, or lesions          Lab Results:   No visits with results within 1 Day(s) from this visit     Latest known visit with results is:   Office Visit on 03/02/2023   Component Date Value   • WBC 03/02/2023 8 61    • RBC 03/02/2023 4 31    • Hemoglobin 03/02/2023 13 1    • Hematocrit 03/02/2023 40 7    • MCV 03/02/2023 94    • MCH 03/02/2023 30 4    • MCHC 03/02/2023 32 2    • RDW 03/02/2023 12 3    • MPV 03/02/2023 10 9    • Platelets 54/13/3566 272    • nRBC 03/02/2023 0    • Neutrophils Relative 03/02/2023 54    • Immat GRANS % 03/02/2023 1    • Lymphocytes Relative 03/02/2023 37    • Monocytes Relative 03/02/2023 6    • Eosinophils Relative 03/02/2023 1    • Basophils Relative 03/02/2023 1    • Neutrophils Absolute 03/02/2023 4 73    • Immature Grans Absolute 03/02/2023 0 05    • Lymphocytes Absolute 03/02/2023 3 21    • Monocytes Absolute 03/02/2023 0 53    • Eosinophils Absolute 03/02/2023 0 05    • Basophils Absolute 03/02/2023 0 04    • Sodium 03/02/2023 138    • Potassium 03/02/2023 3 8    • Chloride 03/02/2023 112 (H)    • CO2 03/02/2023 24    • ANION GAP 03/02/2023 2 (L)    • BUN 03/02/2023 16    • Creatinine 03/02/2023 0 72    • Glucose, Fasting 03/02/2023 95    • Calcium 03/02/2023 9 2    • " AST 03/02/2023 16    • ALT 03/02/2023 32    • Alkaline Phosphatase 03/02/2023 78    • Total Protein 03/02/2023 7 4    • Albumin 03/02/2023 4 0    • Total Bilirubin 03/02/2023 0 36    • eGFR 03/02/2023 100    • Cholesterol 03/02/2023 142    • Triglycerides 03/02/2023 71    • HDL, Direct 03/02/2023 45 (L)    • LDL Calculated 03/02/2023 83    • TSH 3RD GENERATON 03/02/2023 0 874    • Vit D, 25-Hydroxy 03/02/2023 13 2 (L)          Radiology Results:   Mammo screening bilateral w 3d & cad    Result Date: 5/4/2023  Narrative: DIAGNOSIS: Screening mammogram for breast cancer TECHNIQUE: Digital screening mammography was performed  Computer Aided Detection (CAD) analyzed all applicable images  COMPARISONS: Prior breast imaging dated: 05/17/2022, 01/05/2021, 07/01/2020, 12/31/2019, and 12/20/2019 RELEVANT HISTORY: Family Breast Cancer History: History of breast cancer in Mother, Maternal Grandmother, [address] [facility]  Family Medical History: Family medical history includes BRCA1 Negative in mother, BRCA1 Positive in cousin, and breast cancer in 3 relatives (cousin, maternal grandmother, mother)  Personal History: Hormone history includes birth control and birth control  No known relevant surgical history  No known relevant medical history  The patient is scheduled in a reminder system for screening mammography  8-10% of cancers will be missed on mammography  Management of a palpable abnormality must be based on clinical grounds  Patients will be notified of their results via letter from our facility  Accredited by [employer] of Radiology and FDA  RISK ASSESSMENT: 5 Year Tyrer-Cuzick: 2 32 % 10 Year Tyrer-Cuzick: 5 14 % Lifetime Tyrer-Cuzick: 25 43 % TISSUE DENSITY: The breasts are heterogeneously dense, which may obscure small masses  INDICATION: Talat Hutton is a 56 y o  female presenting for screening mammography  FINDINGS: There are no suspicious masses, grouped microcalcifications or areas of architectural distortion  The skin and nipple areolar complex are unremarkable  Impression: No mammographic evidence of malignancy  This patient has been identified as being at elevated risk for development of breast cancer based on the Tyrer-Cuzick model  She may benefit from supplemental screening  ASSESSMENT/BI-RADS CATEGORY: Left: 1 - Negative Right: 1 - Negative Overall: 1 - Negative RECOMMENDATION:      - Routine screening mammogram in 1 year for both breasts   Workstation ID: [id number]

## 2023-06-13 ENCOUNTER — OFFICE VISIT (OUTPATIENT)
Dept: INTERNAL MEDICINE CLINIC | Facility: CLINIC | Age: 48
End: 2023-06-13
Payer: COMMERCIAL

## 2023-06-13 VITALS
RESPIRATION RATE: 15 BRPM | HEIGHT: 57 IN | SYSTOLIC BLOOD PRESSURE: 130 MMHG | WEIGHT: 173 LBS | OXYGEN SATURATION: 96 % | HEART RATE: 83 BPM | DIASTOLIC BLOOD PRESSURE: 65 MMHG | BODY MASS INDEX: 37.32 KG/M2

## 2023-06-13 DIAGNOSIS — T78.2XXA ANAPHYLAXIS, INITIAL ENCOUNTER: ICD-10-CM

## 2023-06-13 DIAGNOSIS — K21.9 GASTROESOPHAGEAL REFLUX DISEASE, UNSPECIFIED WHETHER ESOPHAGITIS PRESENT: ICD-10-CM

## 2023-06-13 DIAGNOSIS — T78.40XA ALLERGY, INITIAL ENCOUNTER: Primary | ICD-10-CM

## 2023-06-13 PROCEDURE — 99214 OFFICE O/P EST MOD 30 MIN: CPT | Performed by: INTERNAL MEDICINE

## 2023-06-13 RX ORDER — OMEPRAZOLE 40 MG/1
40 CAPSULE, DELAYED RELEASE ORAL 2 TIMES DAILY
Qty: 180 CAPSULE | Refills: 1 | Status: SHIPPED | OUTPATIENT
Start: 2023-06-13

## 2023-06-13 RX ORDER — CHOLECALCIFEROL (VITAMIN D3) 50 MCG
2000 TABLET ORAL DAILY
COMMUNITY

## 2023-06-13 RX ORDER — EPINEPHRINE 0.3 MG/.3ML
0.3 INJECTION SUBCUTANEOUS ONCE AS NEEDED
Qty: 0.6 ML | Refills: 2 | Status: SHIPPED | OUTPATIENT
Start: 2023-06-13 | End: 2023-06-14 | Stop reason: SDUPTHER

## 2023-06-13 RX ORDER — LORATADINE 10 MG/1
10 TABLET ORAL DAILY PRN
Qty: 90 TABLET | Refills: 0 | Status: SHIPPED | OUTPATIENT
Start: 2023-06-13
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
## 2023-06-14 DIAGNOSIS — T78.2XXA ANAPHYLAXIS, INITIAL ENCOUNTER: ICD-10-CM

## 2023-06-14 RX ORDER — EPINEPHRINE 0.3 MG/.3ML
0.3 INJECTION SUBCUTANEOUS ONCE AS NEEDED
Qty: 0.6 ML | Refills: 0 | Status: SHIPPED | OUTPATIENT
Start: 2023-06-14

## 2023-06-27 ENCOUNTER — HOSPITAL ENCOUNTER (OUTPATIENT)
Dept: RADIOLOGY | Facility: HOSPITAL | Age: 48
Discharge: HOME | End: 2023-06-27
Attending: INTERNAL MEDICINE
Payer: COMMERCIAL

## 2023-06-27 DIAGNOSIS — R10.13 DYSPEPSIA: ICD-10-CM

## 2023-06-27 PROCEDURE — G1004 CDSM NDSC: HCPCS

## 2023-06-27 PROCEDURE — A9541 TC99M SULFUR COLLOID: HCPCS

## 2023-06-27 PROCEDURE — 78264 GASTRIC EMPTYING IMG STUDY: CPT

## 2023-06-29 ENCOUNTER — TELEPHONE (OUTPATIENT)
Dept: GASTROENTEROLOGY | Facility: CLINIC | Age: 48
End: 2023-06-29

## 2023-06-29 DIAGNOSIS — K31.84 GASTROPARESIS: Primary | ICD-10-CM

## 2023-06-29 NOTE — TELEPHONE ENCOUNTER
"I returned call to patient       Patient very upset per Dr Michelle Altman message on [place], it says to follow up for \"treatment\" patient questioning why she would have to wait for \"treatment\" until next available appt in September     I reviewed results of abnormal finding of gastroparesis and ways to help \"manage\" this eating smaller more frequent meals low fat       Patient verbalized understanding but stated the Dr writing the word \"treatment\" is confusing and misleading and pt didn't want to have to wait for \"treatment\"  "

## 2023-06-29 NOTE — TELEPHONE ENCOUNTER
I spoke with the patient and scheduled her for a follow up appointment w/ Dr Esquivel for the 1-2 month timeframe  Dr Esquivel/ Clinical: The patient is requesting a call back in regards to treatment that she will need and some medication questions prior to her next OV  Please reach out to the patient, thanks!

## 2023-06-29 NOTE — TELEPHONE ENCOUNTER
----- Message from Corrinne Merl, MD sent at 6/28/2023  6:24 PM EDT -----  Results were reviewed and the patient was made aware via My Chart, thanks     Clerical team, please schedule follow-up with me in 1 to 2 months

## 2023-06-30 ENCOUNTER — OFFICE VISIT (OUTPATIENT)
Dept: LAB | Facility: CLINIC | Age: 48
End: 2023-06-30
Payer: COMMERCIAL

## 2023-06-30 DIAGNOSIS — K31.84 GASTROPARESIS: ICD-10-CM

## 2023-06-30 PROCEDURE — 93005 ELECTROCARDIOGRAM TRACING: CPT

## 2023-07-01 ENCOUNTER — APPOINTMENT (OUTPATIENT)
Dept: LAB | Facility: CLINIC | Age: 48
End: 2023-07-01
Payer: COMMERCIAL

## 2023-07-01 DIAGNOSIS — A04.8 H. PYLORI INFECTION: ICD-10-CM

## 2023-07-01 PROCEDURE — 87338 HPYLORI STOOL AG IA: CPT

## 2023-07-02 LAB — H PYLORI AG STL QL IA: NEGATIVE

## 2023-07-03 LAB
ATRIAL RATE: 81 BPM
P AXIS: 57 DEGREES
PR INTERVAL: 142 MS
QRS AXIS: 19 DEGREES
QRSD INTERVAL: 70 MS
QT INTERVAL: 392 MS
QTC INTERVAL: 455 MS
T WAVE AXIS: 26 DEGREES
VENTRICULAR RATE: 81 BPM

## 2023-07-03 PROCEDURE — 93010 ELECTROCARDIOGRAM REPORT: CPT | Performed by: INTERNAL MEDICINE

## 2023-07-06 DIAGNOSIS — M54.16 LUMBAR RADICULOPATHY, ACUTE: ICD-10-CM

## 2023-07-06 DIAGNOSIS — K21.9 GASTROESOPHAGEAL REFLUX DISEASE, UNSPECIFIED WHETHER ESOPHAGITIS PRESENT: ICD-10-CM

## 2023-07-06 RX ORDER — OMEPRAZOLE 40 MG/1
CAPSULE, DELAYED RELEASE ORAL
Qty: 180 CAPSULE | Refills: 2 | Status: SHIPPED | OUTPATIENT
Start: 2023-07-06

## 2023-07-06 RX ORDER — TIZANIDINE 4 MG/1
4 TABLET ORAL EVERY 8 HOURS PRN
Qty: 270 TABLET | Refills: 1 | Status: SHIPPED | OUTPATIENT
Start: 2023-07-06

## 2023-07-28 DIAGNOSIS — R10.13 DYSPEPSIA: ICD-10-CM

## 2023-07-28 RX ORDER — HYOSCYAMINE SULFATE 0.125 MG
0.12 TABLET ORAL EVERY 4 HOURS PRN
Qty: 30 TABLET | Refills: 0 | Status: SHIPPED | OUTPATIENT
Start: 2023-07-28

## 2023-07-31 ENCOUNTER — CLINICAL SUPPORT (OUTPATIENT)
Dept: OBGYN CLINIC | Facility: CLINIC | Age: 48
End: 2023-07-31

## 2023-07-31 DIAGNOSIS — T78.40XA ALLERGY, INITIAL ENCOUNTER: ICD-10-CM

## 2023-07-31 DIAGNOSIS — Z30.42 ENCOUNTER FOR MANAGEMENT AND INJECTION OF DEPO-PROVERA: ICD-10-CM

## 2023-07-31 PROCEDURE — 96372 THER/PROPH/DIAG INJ SC/IM: CPT

## 2023-07-31 RX ORDER — LORATADINE 10 MG/1
TABLET ORAL
Qty: 90 TABLET | Refills: 0 | Status: SHIPPED | OUTPATIENT
Start: 2023-07-31

## 2023-07-31 RX ADMIN — MEDROXYPROGESTERONE ACETATE 150 MG: 150 INJECTION, SUSPENSION INTRAMUSCULAR at 14:21

## 2023-07-31 NOTE — PROGRESS NOTES
Depo-Provera     • [x]   Patient provided box yes  o 0 Refills remain  o Refills submitted no, will get at annual visit on 8/16/23  • Last  Annual Date / Birth control check : 8/2/2022  • Last Depo date: 05/12/2023  • Side effects: no  • HCG: no  • Given by: Cj Aponte  • Site: Left deltoid    o Calcium supplement daily teaching  o Condoms for 2 weeks following first injection dose.

## 2023-08-04 DIAGNOSIS — K21.9 GASTROESOPHAGEAL REFLUX DISEASE, UNSPECIFIED WHETHER ESOPHAGITIS PRESENT: ICD-10-CM

## 2023-08-04 RX ORDER — OMEPRAZOLE 40 MG/1
40 CAPSULE, DELAYED RELEASE ORAL 2 TIMES DAILY
Qty: 180 CAPSULE | Refills: 0 | Status: SHIPPED | OUTPATIENT
Start: 2023-08-04

## 2023-08-31 DIAGNOSIS — R10.13 DYSPEPSIA: ICD-10-CM

## 2023-09-01 DIAGNOSIS — R10.13 DYSPEPSIA: ICD-10-CM

## 2023-09-01 RX ORDER — HYOSCYAMINE SULFATE 0.125 MG
0.12 TABLET ORAL EVERY 4 HOURS PRN
Qty: 90 TABLET | Refills: 1 | Status: SHIPPED | OUTPATIENT
Start: 2023-09-01 | End: 2023-09-01

## 2023-09-01 RX ORDER — HYOSCYAMINE SULFATE 0.125 MG
TABLET ORAL
Qty: 90 TABLET | Refills: 1 | Status: SHIPPED | OUTPATIENT
Start: 2023-09-01

## 2023-09-11 ENCOUNTER — OFFICE VISIT (OUTPATIENT)
Dept: GASTROENTEROLOGY | Facility: CLINIC | Age: 48
End: 2023-09-11
Payer: COMMERCIAL

## 2023-09-11 VITALS
HEIGHT: 57 IN | WEIGHT: 153.2 LBS | SYSTOLIC BLOOD PRESSURE: 120 MMHG | BODY MASS INDEX: 33.05 KG/M2 | TEMPERATURE: 99.5 F | DIASTOLIC BLOOD PRESSURE: 76 MMHG

## 2023-09-11 DIAGNOSIS — K31.84 GASTROPARESIS: Primary | ICD-10-CM

## 2023-09-11 DIAGNOSIS — R10.13 DYSPEPSIA: ICD-10-CM

## 2023-09-11 PROCEDURE — 99214 OFFICE O/P EST MOD 30 MIN: CPT | Performed by: INTERNAL MEDICINE

## 2023-09-11 RX ORDER — HYOSCYAMINE SULFATE 0.125 MG
0.12 TABLET ORAL EVERY 6 HOURS PRN
Qty: 90 TABLET | Refills: 1 | Status: SHIPPED | OUTPATIENT
Start: 2023-09-11

## 2023-09-11 NOTE — PROGRESS NOTES
[facility] - Outpatient Follow-up Note  Yves Long 53 y.o. female MRN: [id number]  Encounter: [id number]          ASSESSMENT AND PLAN:      1. Dyspepsia  2.  Gastroparesis  Patient presenting currently with symptoms of gastroparesis, her symptoms are chronic ongoing for more than a year  Her main symptoms are fullness bloating/distention  Etiology of gastroparesis is probably idiopathic  She is currently on a modified diet and her weight has been stable  GCSI 24  Gastric emptying study has been performed, study was personally reviewed and interpreted and showed half emptying time was 153.6 minutes, gastric emptying at 4 hours was 75% concerning for delayed gastric emptying from gastroparesis  We had an extensive discussion about options for endoscopic treatment including Botox injection, per oral endoscopic myotomy, patient was also made aware that surgery and gastric pacemaker are possible option for treatment  We discussed that Botox effects are usually short lived and can make further intervention such as G-POEM more difficult  We also discussed that medication can help with gastroparesis, domperidone can be started if her QTc allows it  Patient decided for medication, she is already scheduled to see Dr. Elmer Martin in 2 days, follow-up pending Dr. Meka Espino visit, plan discussed with Dr. Elmer Martin via secure messaging  Levsin as needed for abdominal pain    ______________________________________________________________________    SUBJECTIVE:  Patient seen and examined, she is a 72-year-old female patient with chronic abdominal pain, recently found to have gastroparesis, she persists with them similar to prior visit such as fullness and bloating, otherwise she denies any recent events, currently is tolerating PO route, denies nausea or vomiting, is passing flatus and having bowel movements, denies abdominal pain, no recent weight loss      REVIEW OF SYSTEMS IS OTHERWISE NEGATIVE. Historical Information   Past Medical History:   Diagnosis Date   • GERD (gastroesophageal reflux disease)    • Migraine     takes excederine migraine     Past Surgical History:   Procedure Laterality Date   • DENTAL SURGERY     • ENDOMETRIAL ABLATION N/A 4/1/2022    Procedure: ABLATION ENDOMETRIAL Juan Morton;  Surgeon: Aicha Shepherd MD;  [facility];  Service: Gynecology   • [place] HYSTEROSCOPY BX ENDOMETRIUM&/POLYPC W/WO D&C N/A 4/1/2022    Procedure: DILATATION AND CURETTAGE (D&C) WITH HYSTEROSCOPY;  Surgeon: Aicha Shepherd MD;  [facility];  Service: Gynecology   • [place] REMOVAL INTRAUTERINE DEVICE IUD N/A 4/1/2022    Procedure: REMOVAL OF INTRAUTERINE DEVICE (IUD);   Surgeon: Aicha Shepherd MD;  [facility];  Service: Gynecology     Social History   Social History     Substance and Sexual Activity   Alcohol Use Yes    Comment: social     Social History     Substance and Sexual Activity   Drug Use No     Social History     Tobacco Use   Smoking Status Never   Smokeless Tobacco Never     Family History   Problem Relation Age of Onset   • Breast cancer Mother         58 y/o    • BRCA1 Negative Mother    • Lung cancer Mother 77   • Diabetes Father    • Hypertension Father    • No Known Problems Daughter    • Breast cancer Maternal Grandmother 46   • Lung cancer Maternal Grandmother 59   • Alzheimer's disease Maternal Grandfather    • Heart disease Paternal Grandmother    • No Known Problems Paternal Grandfather    • No Known Problems Brother    • No Known Problems Brother    • No Known Problems Brother    • No Known Problems Son    • No Known Problems Son    • No Known Problems Maternal Aunt    • No Known Problems Maternal Aunt    • No Known Problems Maternal Aunt    • No Known Problems Maternal Aunt    • Lung cancer Maternal Uncle 48   • No Known Problems Paternal Aunt    • No Known Problems Paternal Aunt    • No Known Problems Paternal Aunt    • No Known Problems Paternal Aunt • Breast cancer Cousin         age dx unknown   • Throat cancer Cousin 39   • BRCA1 Positive Cousin    • Kidney cancer Cousin 39       Meds/Allergies       Current Outpatient Medications:   •  Cholecalciferol (Vitamin D) 50 MCG (2000 UT) tablet  •  EPINEPHrine (EPIPEN) 0.3 mg/0.3 mL SOAJ  •  hyoscyamine (ANASPAZ,LEVSIN) 0.125 MG tablet  •  ibuprofen (MOTRIN) 600 mg tablet  •  loratadine (CLARITIN) 10 mg tablet  •  omeprazole (PriLOSEC) 40 MG capsule  •  tiZANidine (ZANAFLEX) 4 mg tablet  •  medroxyPROGESTERone (DEPO-PROVERA) 150 mg/mL injection  •  medroxyPROGESTERone acetate (DEPO-PROVERA SYRINGE) 150 mg/mL injection  •  Multiple Vitamin (multivitamin) tablet    Current Facility-Administered Medications:   •  medroxyPROGESTERone (DEPO-PROVERA) IM injection 150 mg, 150 mg, Intramuscular, Q3 Months, 150 mg at 07/31/23 1421    Allergies   Allergen Reactions   • Bee Venom Anaphylaxis   • Other Swelling     Patient states that she gets hives and swells when she comes in contact with clorox or bleach products. Objective     Blood pressure 120/76, temperature 99.5 °F (37.5 °C), temperature source Tympanic, height 4' 9" (1.448 m), weight 69.5 kg (153 lb 3.2 oz), not currently breastfeeding. Body mass index is 33.15 kg/m². PHYSICAL EXAM:      General Appearance:   Alert, cooperative, no distress   HEENT:   Normocephalic, atraumatic, anicteric. Neck:  Supple, symmetrical, trachea midline   Lungs:   Clear to auscultation bilaterally; no rales, rhonchi or wheezing; respirations unlabored    Heart[de-identified]   Regular rate and rhythm; no murmur, rub, or gallop.    Abdomen:   Soft, mildly tender in the epigastric area, non-distended; normal bowel sounds; no masses, no organomegaly    Genitalia:   Deferred    Rectal:   Deferred    Extremities:  No edema    Lymph nodes:  No palpable cervical lymphadenopathy    Skin:  No jaundice, rashes, or lesions          Lab Results:   No visits with results within 1 Day(s) from this visit. Latest known visit with results is:   Appointment on 07/01/2023   Component Date Value   • H pylori Ag, Stl 07/01/2023 Negative          Radiology Results:   No results found.

## 2023-09-11 NOTE — LETTER
September 11, 2023     Jose Bowling MD  [address] [facility] [address] [place]    Patient: Neeraj Jacobson   YOB: 1975   Date of Visit: 9/11/2023       Dear Dr. Morris Pace:    Thank you for referring Sivan Rosenberg to me for evaluation. Below are my notes for this consultation. If you have questions, please do not hesitate to call me. I look forward to following your patient along with you. Sincerely,        Carlos Perera MD        CC: No Recipients    Carlos Perera MD  9/11/2023  4:40 PM  Incomplete  [facility] - Outpatient Follow-up Note  Neeraj Jacobson 53 y.o. female MRN: [id number]  Encounter: [id number]          ASSESSMENT AND PLAN:      1. Dyspepsia  2.  Gastroparesis  Patient presenting currently with symptoms of gastroparesis, her symptoms are chronic ongoing for more than a year  Her main symptoms are fullness bloating/distention  Etiology of gastroparesis is probably idiopathic  She is currently on a modified diet and her weight has been stable  GCSI 24  Gastric emptying study has been performed, study was personally reviewed and interpreted and showed half emptying time was 153.6 minutes, gastric emptying at 4 hours was 75% concerning for delayed gastric emptying from gastroparesis  We had an extensive discussion about options for endoscopic treatment including Botox injection, per oral endoscopic myotomy, patient was also made aware that surgery and gastric pacemaker are possible option for treatment  We discussed that Botox effects are usually short lived and can make further intervention such as G-POEM more difficult  We also discussed that medication can help with gastroparesis, domperidone can be started if her QTc allows it  Patient decided for medication, she is already scheduled to see Dr. Morris Pace in 2 days, follow-up pending Dr. Brenda Teixeira visit, plan discussed with Dr. Morris Pace via secure messaging  Levsin as needed for abdominal pain    ______________________________________________________________________    SUBJECTIVE:  Patient seen and examined, she is a 52-year-old female patient with chronic abdominal pain, recently found to have gastroparesis, she persists with them similar to prior visit such as fullness and bloating, otherwise she denies any recent events, currently is tolerating PO route, denies nausea or vomiting, is passing flatus and having bowel movements, denies abdominal pain, no recent weight loss      REVIEW OF SYSTEMS IS OTHERWISE NEGATIVE. Historical Information   Past Medical History:   Diagnosis Date   • GERD (gastroesophageal reflux disease)    • Migraine     takes excederine migraine     Past Surgical History:   Procedure Laterality Date   • DENTAL SURGERY     • ENDOMETRIAL ABLATION N/A 4/1/2022    Procedure: ABLATION ENDOMETRIAL Shelbie Kamron;  Surgeon: Brenda Chambers MD;  [facility];  Service: Gynecology   • [place] HYSTEROSCOPY BX ENDOMETRIUM&/POLYPC W/WO D&C N/A 4/1/2022    Procedure: DILATATION AND CURETTAGE (D&C) WITH HYSTEROSCOPY;  Surgeon: Brenda Chambers MD;  [facility];  Service: Gynecology   • [place] REMOVAL INTRAUTERINE DEVICE IUD N/A 4/1/2022    Procedure: REMOVAL OF INTRAUTERINE DEVICE (IUD);   Surgeon: Brenda Chambers MD;  [facility];  Service: Gynecology     Social History   Social History     Substance and Sexual Activity   Alcohol Use Yes    Comment: social     Social History     Substance and Sexual Activity   Drug Use No     Social History     Tobacco Use   Smoking Status Never   Smokeless Tobacco Never     Family History   Problem Relation Age of Onset   • Breast cancer Mother         58 y/o    • BRCA1 Negative Mother    • Lung cancer Mother 77   • Diabetes Father    • Hypertension Father    • No Known Problems Daughter    • Breast cancer Maternal Grandmother 46   • Lung cancer Maternal Grandmother 59   • Alzheimer's disease Maternal Grandfather    • Heart disease Paternal Grandmother    • No Known Problems Paternal Grandfather    • No Known Problems Brother    • No Known Problems Brother    • No Known Problems Brother    • No Known Problems Son    • No Known Problems Son    • No Known Problems Maternal Aunt    • No Known Problems Maternal Aunt    • No Known Problems Maternal Aunt    • No Known Problems Maternal Aunt    • Lung cancer Maternal Uncle 48   • No Known Problems Paternal Aunt    • No Known Problems Paternal Aunt    • No Known Problems Paternal Aunt    • No Known Problems Paternal Aunt    • Breast cancer Cousin         age dx unknown   • Throat cancer Cousin 39   • BRCA1 Positive Cousin    • Kidney cancer Cousin 39       Meds/Allergies       Current Outpatient Medications:   •  Cholecalciferol (Vitamin D) 50 MCG (2000 UT) tablet  •  EPINEPHrine (EPIPEN) 0.3 mg/0.3 mL SOAJ  •  hyoscyamine (ANASPAZ,LEVSIN) 0.125 MG tablet  •  ibuprofen (MOTRIN) 600 mg tablet  •  loratadine (CLARITIN) 10 mg tablet  •  omeprazole (PriLOSEC) 40 MG capsule  •  tiZANidine (ZANAFLEX) 4 mg tablet  •  medroxyPROGESTERone (DEPO-PROVERA) 150 mg/mL injection  •  medroxyPROGESTERone acetate (DEPO-PROVERA SYRINGE) 150 mg/mL injection  •  Multiple Vitamin (multivitamin) tablet    Current Facility-Administered Medications:   •  medroxyPROGESTERone (DEPO-PROVERA) IM injection 150 mg, 150 mg, Intramuscular, Q3 Months, 150 mg at 07/31/23 1421    Allergies   Allergen Reactions   • Bee Venom Anaphylaxis   • Other Swelling     Patient states that she gets hives and swells when she comes in contact with clorox or bleach products. Objective     Blood pressure 120/76, temperature 99.5 °F (37.5 °C), temperature source Tympanic, height 4' 9" (1.448 m), weight 69.5 kg (153 lb 3.2 oz), not currently breastfeeding. Body mass index is 33.15 kg/m². PHYSICAL EXAM:      General Appearance:   Alert, cooperative, no distress   HEENT:   Normocephalic, atraumatic, anicteric.      Neck:  Supple, symmetrical, trachea midline   Lungs:   Clear to auscultation bilaterally; no rales, rhonchi or wheezing; respirations unlabored    Heart[de-identified]   Regular rate and rhythm; no murmur, rub, or gallop. Abdomen:   Soft, mildly tender in the epigastric area, non-distended; normal bowel sounds; no masses, no organomegaly    Genitalia:   Deferred    Rectal:   Deferred    Extremities:  No edema    Lymph nodes:  No palpable cervical lymphadenopathy    Skin:  No jaundice, rashes, or lesions          Lab Results:   No visits with results within 1 Day(s) from this visit. Latest known visit with results is:   Appointment on 07/01/2023   Component Date Value   • H pylori Ag, Stl 07/01/2023 Negative          Radiology Results:   No results found. Courtney Ramirez MD  9/11/2023 10:46 AM  Sign when Signing Visit  [facility] - Outpatient Follow-up Note  Adarsh Man 53 y.o. female MRN: [id number]  Encounter: [id number]          ASSESSMENT AND PLAN:      1. Dyspepsia  2.  Gastroparesis  Patient presenting currently with symptoms of gastroparesis  Her main symptoms are fullness bloating/distention  Etiology of gastroparesis is probably idiopathic  She is currently on a modified diet and her weight has been ***  GCSI 24  Gastric emptying study has been performed and showed half emptying time was ***, gastric emptying at 4 hours was ***  We had an extensive discussion about options for endoscopic treatment including Botox injection, per oral endoscopic myotomy, patient was also made aware that surgery and gastric pacemaker are possible option for treatment  We discussed that Botox effects are usually short lived and can make further intervention such as G-POEM more difficult  Patient decided for G-POEM, patient was made aware this is a novel endoscopic procedure that provide some relief of her gastroparesis symptoms similar to surgical pyloroplasty, patient was made aware G-POEM does not provide a cure for her gastroparesis and symptoms do not improve at all in some patients  Patient is aware that hospital admission is required after the procedure, usually for 1 night. The patient is in agreement with the plan, the risks and benefits of the procedure were discussed with the patient including but not limited to bleeding, infection, perforation, CO2 related complications such as capnothorax, capnomediastinum, capnoperitoneum, subcutaneous emphysema, as well as the need for surgery in case of emergency. ______________________________________________________________________    SUBJECTIVE:  ***      REVIEW OF SYSTEMS IS OTHERWISE NEGATIVE. Historical Information   Past Medical History:   Diagnosis Date   • GERD (gastroesophageal reflux disease)    • Migraine     takes excederine migraine     Past Surgical History:   Procedure Laterality Date   • DENTAL SURGERY     • ENDOMETRIAL ABLATION N/A 4/1/2022    Procedure: ABLATION ENDOMETRIAL Eli Venegas;  Surgeon: Genet Wilcox MD;  [facility];  Service: Gynecology   • [place] HYSTEROSCOPY BX ENDOMETRIUM&/POLYPC W/WO D&C N/A 4/1/2022    Procedure: DILATATION AND CURETTAGE (D&C) WITH HYSTEROSCOPY;  Surgeon: Genet Wilcox MD;  [facility];  Service: Gynecology   • [place] REMOVAL INTRAUTERINE DEVICE IUD N/A 4/1/2022    Procedure: REMOVAL OF INTRAUTERINE DEVICE (IUD);   Surgeon: Genet Wilcox MD;  [facility];  Service: Gynecology     Social History   Social History     Substance and Sexual Activity   Alcohol Use Yes    Comment: social     Social History     Substance and Sexual Activity   Drug Use No     Social History     Tobacco Use   Smoking Status Never   Smokeless Tobacco Never     Family History   Problem Relation Age of Onset   • Breast cancer Mother         58 y/o    • BRCA1 Negative Mother    • Lung cancer Mother 77   • Diabetes Father    • Hypertension Father    • No Known Problems Daughter    • Breast cancer Maternal Grandmother 46   • Lung cancer Maternal Grandmother 59   • Alzheimer's disease Maternal Grandfather    • Heart disease Paternal Grandmother    • No Known Problems Paternal Grandfather    • No Known Problems Brother    • No Known Problems Brother    • No Known Problems Brother    • No Known Problems Son    • No Known Problems Son    • No Known Problems Maternal Aunt    • No Known Problems Maternal Aunt    • No Known Problems Maternal Aunt    • No Known Problems Maternal Aunt    • Lung cancer Maternal Uncle 48   • No Known Problems Paternal Aunt    • No Known Problems Paternal Aunt    • No Known Problems Paternal Aunt    • No Known Problems Paternal Aunt    • Breast cancer Cousin         age dx unknown   • Throat cancer Cousin 39   • BRCA1 Positive Cousin    • Kidney cancer Cousin 39       Meds/Allergies       Current Outpatient Medications:   •  Cholecalciferol (Vitamin D) 50 MCG (2000 UT) tablet  •  EPINEPHrine (EPIPEN) 0.3 mg/0.3 mL SOAJ  •  hyoscyamine (ANASPAZ,LEVSIN) 0.125 MG tablet  •  ibuprofen (MOTRIN) 600 mg tablet  •  loratadine (CLARITIN) 10 mg tablet  •  omeprazole (PriLOSEC) 40 MG capsule  •  tiZANidine (ZANAFLEX) 4 mg tablet  •  medroxyPROGESTERone (DEPO-PROVERA) 150 mg/mL injection  •  medroxyPROGESTERone acetate (DEPO-PROVERA SYRINGE) 150 mg/mL injection  •  Multiple Vitamin (multivitamin) tablet    Current Facility-Administered Medications:   •  medroxyPROGESTERone (DEPO-PROVERA) IM injection 150 mg, 150 mg, Intramuscular, Q3 Months, 150 mg at 07/31/23 1421    Allergies   Allergen Reactions   • Bee Venom Anaphylaxis   • Other Swelling     Patient states that she gets hives and swells when she comes in contact with clorox or bleach products. Objective     Blood pressure 120/76, temperature 99.5 °F (37.5 °C), temperature source Tympanic, height 4' 9" (1.448 m), weight 69.5 kg (153 lb 3.2 oz), not currently breastfeeding. Body mass index is 33.15 kg/m².       PHYSICAL EXAM:      General Appearance:   Alert, cooperative, no distress   HEENT:   Normocephalic, atraumatic, anicteric. Neck:  Supple, symmetrical, trachea midline   Lungs:   Clear to auscultation bilaterally; no rales, rhonchi or wheezing; respirations unlabored    Heart[de-identified]   Regular rate and rhythm; no murmur, rub, or gallop. Abdomen:   Soft, non-tender, non-distended; normal bowel sounds; no masses, no organomegaly    Genitalia:   Deferred    Rectal:   Deferred    Extremities:  No edema    Lymph nodes:  No palpable cervical lymphadenopathy    Skin:  No jaundice, rashes, or lesions          Lab Results:   No visits with results within 1 Day(s) from this visit. Latest known visit with results is:   Appointment on 07/01/2023   Component Date Value   • H pylori Ag, Stl 07/01/2023 Negative          Radiology Results:   No results found.

## 2023-09-11 NOTE — LETTER
September 11, 2023     Corazon Smith MD  [address] [facility] [address] [place].    Patient: Jesse Romberg   YOB: 1975   Date of Visit: 9/11/2023       Dear Dr. Alan Jernigan:    Thank you for referring Phuong Yu to me for evaluation. Below are my notes for this consultation. If you have questions, please do not hesitate to call me. I look forward to following your patient along with you. Sincerely,        Kurt Chávez MD        CC: No Recipients    Kurt Chávez MD  9/11/2023  4:40 PM  Sign when Signing Visit  [facility] - Outpatient Follow-up Note  Jesse Romberg 53 y.o. female MRN: [id number]  Encounter: [id number]          ASSESSMENT AND PLAN:      1. Dyspepsia  2.  Gastroparesis  Patient presenting currently with symptoms of gastroparesis, her symptoms are chronic ongoing for more than a year  Her main symptoms are fullness bloating/distention  Etiology of gastroparesis is probably idiopathic  She is currently on a modified diet and her weight has been stable  GCSI 24  Gastric emptying study has been performed, study was personally reviewed and interpreted and showed half emptying time was 153.6 minutes, gastric emptying at 4 hours was 75% concerning for delayed gastric emptying from gastroparesis  We had an extensive discussion about options for endoscopic treatment including Botox injection, per oral endoscopic myotomy, patient was also made aware that surgery and gastric pacemaker are possible option for treatment  We discussed that Botox effects are usually short lived and can make further intervention such as G-POEM more difficult  We also discussed that medication can help with gastroparesis, domperidone can be started if her QTc allows it  Patient decided for medication, she is already scheduled to see Dr. Alan Jernigan in 2 days, follow-up pending Dr. Arcadio Seaman visit, plan discussed with Dr. Alan Jernigan via secure messaging  Levsin as needed for abdominal pain    ______________________________________________________________________    SUBJECTIVE:  Patient seen and examined, she is a 60-year-old female patient with chronic abdominal pain, recently found to have gastroparesis, she persists with them similar to prior visit such as fullness and bloating, otherwise she denies any recent events, currently is tolerating PO route, denies nausea or vomiting, is passing flatus and having bowel movements, denies abdominal pain, no recent weight loss      REVIEW OF SYSTEMS IS OTHERWISE NEGATIVE. Historical Information   Past Medical History:   Diagnosis Date   • GERD (gastroesophageal reflux disease)    • Migraine     takes excederine migraine     Past Surgical History:   Procedure Laterality Date   • DENTAL SURGERY     • ENDOMETRIAL ABLATION N/A 4/1/2022    Procedure: ABLATION ENDOMETRIAL Ange Oas;  Surgeon: Jayln Rice MD;  [facility];  Service: Gynecology   • OR HYSTEROSCOPY BX ENDOMETRIUM&/POLYPC W/WO D&C N/A 4/1/2022    Procedure: DILATATION AND CURETTAGE (D&C) WITH HYSTEROSCOPY;  Surgeon: Jalyn Rice MD;  [facility];  Service: Gynecology   • OR REMOVAL INTRAUTERINE DEVICE IUD N/A 4/1/2022    Procedure: REMOVAL OF INTRAUTERINE DEVICE (IUD);   Surgeon: Jalyn Rice MD;  [facility];  Service: Gynecology     Social History   Social History     Substance and Sexual Activity   Alcohol Use Yes    Comment: social     Social History     Substance and Sexual Activity   Drug Use No     Social History     Tobacco Use   Smoking Status Never   Smokeless Tobacco Never     Family History   Problem Relation Age of Onset   • Breast cancer Mother         58 y/o    • BRCA1 Negative Mother    • Lung cancer Mother 77   • Diabetes Father    • Hypertension Father    • No Known Problems Daughter    • Breast cancer Maternal Grandmother 46   • Lung cancer Maternal Grandmother 59   • Alzheimer's disease Maternal Grandfather    • Heart disease Paternal Grandmother    • No Known Problems Paternal Grandfather    • No Known Problems Brother    • No Known Problems Brother    • No Known Problems Brother    • No Known Problems Son    • No Known Problems Son    • No Known Problems Maternal Aunt    • No Known Problems Maternal Aunt    • No Known Problems Maternal Aunt    • No Known Problems Maternal Aunt    • Lung cancer Maternal Uncle 48   • No Known Problems Paternal Aunt    • No Known Problems Paternal Aunt    • No Known Problems Paternal Aunt    • No Known Problems Paternal Aunt    • Breast cancer Cousin         age dx unknown   • Throat cancer Cousin 39   • BRCA1 Positive Cousin    • Kidney cancer Cousin 39       Meds/Allergies       Current Outpatient Medications:   •  Cholecalciferol (Vitamin D) 50 MCG (2000 UT) tablet  •  EPINEPHrine (EPIPEN) 0.3 mg/0.3 mL SOAJ  •  hyoscyamine (ANASPAZ,LEVSIN) 0.125 MG tablet  •  ibuprofen (MOTRIN) 600 mg tablet  •  loratadine (CLARITIN) 10 mg tablet  •  omeprazole (PriLOSEC) 40 MG capsule  •  tiZANidine (ZANAFLEX) 4 mg tablet  •  medroxyPROGESTERone (DEPO-PROVERA) 150 mg/mL injection  •  medroxyPROGESTERone acetate (DEPO-PROVERA SYRINGE) 150 mg/mL injection  •  Multiple Vitamin (multivitamin) tablet    Current Facility-Administered Medications:   •  medroxyPROGESTERone (DEPO-PROVERA) IM injection 150 mg, 150 mg, Intramuscular, Q3 Months, 150 mg at 07/31/23 1421    Allergies   Allergen Reactions   • Bee Venom Anaphylaxis   • Other Swelling     Patient states that she gets hives and swells when she comes in contact with clorox or bleach products. Objective     Blood pressure 120/76, temperature 99.5 °F (37.5 °C), temperature source Tympanic, height 4' 9" (1.448 m), weight 69.5 kg (153 lb 3.2 oz), not currently breastfeeding. Body mass index is 33.15 kg/m². PHYSICAL EXAM:      General Appearance:   Alert, cooperative, no distress   HEENT:   Normocephalic, atraumatic, anicteric. Neck:  Supple, symmetrical, trachea midline   Lungs:   Clear to auscultation bilaterally; no rales, rhonchi or wheezing; respirations unlabored    Heart[de-identified]   Regular rate and rhythm; no murmur, rub, or gallop. Abdomen:   Soft, mildly tender in the epigastric area, non-distended; normal bowel sounds; no masses, no organomegaly    Genitalia:   Deferred    Rectal:   Deferred    Extremities:  No edema    Lymph nodes:  No palpable cervical lymphadenopathy    Skin:  No jaundice, rashes, or lesions          Lab Results:   No visits with results within 1 Day(s) from this visit. Latest known visit with results is:   Appointment on 07/01/2023   Component Date Value   • H pylori Ag, Stl 07/01/2023 Negative          Radiology Results:   No results found.

## 2023-09-13 ENCOUNTER — TELEPHONE (OUTPATIENT)
Dept: GASTROENTEROLOGY | Facility: CLINIC | Age: 48
End: 2023-09-13

## 2023-09-13 ENCOUNTER — OFFICE VISIT (OUTPATIENT)
Dept: GASTROENTEROLOGY | Facility: CLINIC | Age: 48
End: 2023-09-13
Payer: COMMERCIAL

## 2023-09-13 VITALS
HEIGHT: 57 IN | BODY MASS INDEX: 33.44 KG/M2 | DIASTOLIC BLOOD PRESSURE: 70 MMHG | WEIGHT: 155 LBS | SYSTOLIC BLOOD PRESSURE: 110 MMHG | TEMPERATURE: 98.9 F

## 2023-09-13 DIAGNOSIS — K58.0 IRRITABLE BOWEL SYNDROME WITH DIARRHEA: ICD-10-CM

## 2023-09-13 DIAGNOSIS — R14.0 BLOATING: Primary | ICD-10-CM

## 2023-09-13 DIAGNOSIS — K31.84 GASTROPARESIS: ICD-10-CM

## 2023-09-13 DIAGNOSIS — K21.9 GASTROESOPHAGEAL REFLUX DISEASE WITHOUT ESOPHAGITIS: ICD-10-CM

## 2023-09-13 PROCEDURE — 99214 OFFICE O/P EST MOD 30 MIN: CPT | Performed by: INTERNAL MEDICINE

## 2023-09-13 NOTE — TELEPHONE ENCOUNTER
I called the patient in regards to SIBO kit that  had ordered for her. I advised her that we received a shipment of them shortly after she left the office but there is one sitting at the  with instructions for her in [facility].

## 2023-09-13 NOTE — PROGRESS NOTES
[facility] - Outpatient Follow-up Note  Linh Gutiérrez 53 y.o. female MRN: [id number]  Encounter: [id number]          ASSESSMENT AND PLAN:      1. Bloating  2. Gastroesophageal reflux disease without esophagitis  3. Gastroparesis  4. Irritable bowel syndrome with diarrhea    She has multiple GI symptoms including abdominal bloating, gas, fullness and change in bowel habits. Her predominant symptoms are likely secondary to irritable bowel syndrome and dyspepsia. She does have some symptoms of gastroparesis. I discussed starting on a low FODMAP diet  Breath test to assess for small intestine bacterial overgrowth  Take Gas-X as needed   I reviewed diet and lifestyle precautions. This includes limiting coffee, soda, tomatoes, citrus, fatty and spicy foods. I recommend waiting 3 hours after dinner to lie down. I recommend eating small frequent meals as well as sleeping with head of bed elevated at night. Low-fat and low fiber diet  Handout on gastroparesis diet given to the patient  Decrease omeprazole to 40 mg once daily  Levsin as needed for abdominal pain  We also discussed gastroparesis treatment options including domperidone and endoscopic interventions such as G POEM. I would like to complete further work-up and rule out other underlying etiologies prior to committing her to domperidone and other treatment of gastroparesis. Follow-up in 2 months    ______________________________________________________________________    SUBJECTIVE: 52-year-old female with gastroparesis referred to me for  consideration of domperidone. She has multiple GI symptoms including chronic abdominal pain, intermittent nausea, early satiety and bloating. Her abdominal pain is diffuse mostly in mid and lower abdomen associated with bloating and gas. She does report intermittent nausea and early satiety. She also reports intermittent loose bowel movement multiple times a day.   I reviewed her gastric emptying study which showed 75% emptying at 4 hours    She discussed treatmentendoscopic treatment including Botox injection, per oral endoscopic myotomy, patient was also made aware that surgery and gastric pacemaker are possible option for treatment    REVIEW OF SYSTEMS IS OTHERWISE NEGATIVE. Historical Information   Past Medical History:   Diagnosis Date   • GERD (gastroesophageal reflux disease)    • Migraine     takes excederine migraine     Past Surgical History:   Procedure Laterality Date   • DENTAL SURGERY     • ENDOMETRIAL ABLATION N/A 4/1/2022    Procedure: ABLATION ENDOMETRIAL Pattricia Rathke;  Surgeon: Jose Luis Huff MD;  [facility];  Service: Gynecology   • [place] HYSTEROSCOPY BX ENDOMETRIUM&/POLYPC W/WO D&C N/A 4/1/2022    Procedure: DILATATION AND CURETTAGE (D&C) WITH HYSTEROSCOPY;  Surgeon: Jose Luis Huff MD;  [facility];  Service: Gynecology   • [place] REMOVAL INTRAUTERINE DEVICE IUD N/A 4/1/2022    Procedure: REMOVAL OF INTRAUTERINE DEVICE (IUD);   Surgeon: Jose Luis Huff MD;  [facility];  Service: Gynecology     Social History   Social History     Substance and Sexual Activity   Alcohol Use Yes    Comment: social     Social History     Substance and Sexual Activity   Drug Use No     Social History     Tobacco Use   Smoking Status Never   Smokeless Tobacco Never     Family History   Problem Relation Age of Onset   • Breast cancer Mother         58 y/o    • BRCA1 Negative Mother    • Lung cancer Mother 77   • Diabetes Father    • Hypertension Father    • No Known Problems Daughter    • Breast cancer Maternal Grandmother 46   • Lung cancer Maternal Grandmother 59   • Alzheimer's disease Maternal Grandfather    • Heart disease Paternal Grandmother    • No Known Problems Paternal Grandfather    • No Known Problems Brother    • No Known Problems Brother    • No Known Problems Brother    • No Known Problems Son    • No Known Problems Son    • No Known Problems Maternal Aunt    • No Known Problems Maternal Aunt    • No Known Problems Maternal Aunt    • No Known Problems Maternal Aunt    • Lung cancer Maternal Uncle 48   • No Known Problems Paternal Aunt    • No Known Problems Paternal Aunt    • No Known Problems Paternal Aunt    • No Known Problems Paternal Aunt    • Breast cancer Cousin         age dx unknown   • Throat cancer Cousin 39   • BRCA1 Positive Cousin    • Kidney cancer Cousin 39       Meds/Allergies       Current Outpatient Medications:   •  Cholecalciferol (Vitamin D) 50 MCG (2000 UT) tablet  •  EPINEPHrine (EPIPEN) 0.3 mg/0.3 mL SOAJ  •  hyoscyamine (ANASPAZ,LEVSIN) 0.125 MG tablet  •  ibuprofen (MOTRIN) 600 mg tablet  •  loratadine (CLARITIN) 10 mg tablet  •  medroxyPROGESTERone (DEPO-PROVERA) 150 mg/mL injection  •  Multiple Vitamin (multivitamin) tablet  •  omeprazole (PriLOSEC) 40 MG capsule  •  tiZANidine (ZANAFLEX) 4 mg tablet  •  medroxyPROGESTERone acetate (DEPO-PROVERA SYRINGE) 150 mg/mL injection    Current Facility-Administered Medications:   •  medroxyPROGESTERone (DEPO-PROVERA) IM injection 150 mg, 150 mg, Intramuscular, Q3 Months, 150 mg at 07/31/23 1421    Allergies   Allergen Reactions   • Bee Venom Anaphylaxis   • Other Swelling     Patient states that she gets hives and swells when she comes in contact with clorox or bleach products. Objective     Blood pressure 110/70, temperature 98.9 °F (37.2 °C), temperature source Tympanic, height 4' 9" (1.448 m), weight 70.3 kg (155 lb), not currently breastfeeding. Body mass index is 33.54 kg/m². PHYSICAL EXAM:      General Appearance:   Alert, cooperative, no distress   HEENT:   Normocephalic, atraumatic, anicteric.     Neck:  Supple, symmetrical, trachea midline   Lungs:   Clear to auscultation bilaterally; no rales, rhonchi or wheezing; respirations unlabored    Heart[de-identified]   Regular rate and rhythm; no murmur, rub, or gallop.    Abdomen:   Soft, non-tender, non-distended; normal bowel sounds; no masses, no organomegaly    Genitalia:   Deferred    Rectal:   Deferred    Extremities:  No cyanosis, clubbing or edema    Pulses:  2+ and symmetric    Skin:  No jaundice, rashes, or lesions    Lymph nodes:  No palpable cervical lymphadenopathy        Lab Results:   No visits with results within 1 Day(s) from this visit. Latest known visit with results is:   Appointment on 07/01/2023   Component Date Value   • H pylori Ag, Stl 07/01/2023 Negative          Radiology Results:   No results found. Answers for HPI/ROS submitted by the patient on 9/6/2023  Chronicity: chronic  Onset: more than 1 year ago  Onset quality: sudden  Frequency: daily  Episode duration: 1 Hours  Progression since onset: waxing and waning  Pain location: periumbilical region, right flank  Pain - numeric: 8/10  Pain quality: cramping, a sensation of fullness  Radiates to: RLQ, RUQ, epigastric region  anorexia: Yes  arthralgias: Yes  belching: Yes  constipation: Yes  diarrhea: Yes  dysuria: No  fever: No  flatus: Yes  frequency: Yes  headaches: Yes  hematochezia: No  melena:  Yes  myalgias: Yes  nausea: Yes  weight loss: No  vomiting: No  Aggravated by: eating  Relieved by: bowel movements  Diagnostic workup: lower endoscopy

## 2023-09-16 ENCOUNTER — HOSPITAL ENCOUNTER (EMERGENCY)
Facility: HOSPITAL | Age: 48
Discharge: HOME | End: 2023-09-16
Attending: STUDENT IN AN ORGANIZED HEALTH CARE EDUCATION/TRAINING PROGRAM | Admitting: STUDENT IN AN ORGANIZED HEALTH CARE EDUCATION/TRAINING PROGRAM
Payer: COMMERCIAL

## 2023-09-16 VITALS
HEART RATE: 86 BPM | TEMPERATURE: 98.1 F | DIASTOLIC BLOOD PRESSURE: 68 MMHG | SYSTOLIC BLOOD PRESSURE: 106 MMHG | OXYGEN SATURATION: 97 % | RESPIRATION RATE: 20 BRPM

## 2023-09-16 DIAGNOSIS — J02.9 PHARYNGITIS: ICD-10-CM

## 2023-09-16 DIAGNOSIS — J03.90 TONSILLITIS: Primary | ICD-10-CM

## 2023-09-16 DIAGNOSIS — J02.9 SORE THROAT: ICD-10-CM

## 2023-09-16 PROCEDURE — 99284 EMERGENCY DEPT VISIT MOD MDM: CPT | Performed by: STUDENT IN AN ORGANIZED HEALTH CARE EDUCATION/TRAINING PROGRAM

## 2023-09-16 PROCEDURE — 99283 EMERGENCY DEPT VISIT LOW MDM: CPT

## 2023-09-16 RX ORDER — AMOXICILLIN 250 MG/1
500 CAPSULE ORAL ONCE
Status: COMPLETED | OUTPATIENT
Start: 2023-09-16 | End: 2023-09-16

## 2023-09-16 RX ORDER — DEXAMETHASONE 4 MG/1
12 TABLET ORAL ONCE
Status: COMPLETED | OUTPATIENT
Start: 2023-09-16 | End: 2023-09-16

## 2023-09-16 RX ORDER — AMOXICILLIN 500 MG/1
500 CAPSULE ORAL EVERY 12 HOURS SCHEDULED
Qty: 10 CAPSULE | Refills: 0 | Status: SHIPPED | OUTPATIENT
Start: 2023-09-16 | End: 2023-09-21

## 2023-09-16 RX ORDER — ACETAMINOPHEN 325 MG/1
650 TABLET ORAL ONCE
Status: COMPLETED | OUTPATIENT
Start: 2023-09-16 | End: 2023-09-16

## 2023-09-16 RX ADMIN — DEXAMETHASONE 12 MG: 4 TABLET ORAL at 14:01

## 2023-09-16 RX ADMIN — AMOXICILLIN 500 MG: 250 CAPSULE ORAL at 14:00

## 2023-09-16 RX ADMIN — ACETAMINOPHEN 650 MG: 325 TABLET, FILM COATED ORAL at 14:00

## 2023-09-16 NOTE — DISCHARGE INSTRUCTIONS
You were seen today for a sore throat. You were given Tylenol, dexamethasone and amoxicillin in the ED. please  your prescription for antibiotics. - Please return to ED if you have difficulty breathing, difficulty swallowing, chest pain, feeling of throat closing.

## 2023-09-16 NOTE — ED ATTENDING ATTESTATION
9/16/2023  Ashley Scott DO, saw and evaluated the patient. I have discussed the patient with the resident/non-physician practitioner and agree with the resident's/non-physician practitioner's findings, Plan of Care, and MDM as documented in the resident's/non-physician practitioner's note, except where noted. All available labs and Radiology studies were reviewed. I was present for key portions of any procedure(s) performed by the resident/non-physician practitioner and I was immediately available to provide assistance. At this point I agree with the current assessment done in the Emergency Department. I have conducted an independent evaluation of this patient a history and physical is as follows:    60-year-old female presents to the ED for evaluation of cough, congestion, throat pain. Symptoms have been ongoing for 3 days. Reports associated subjective fevers/chills, has been taking Tylenol round-the-clock with last dose at 6 AM today. No known sick contacts. No known provoking or palliating factors. No recent travel. States that voice is scratchy but not muffled. On my exam, is resting comfortably no acute distress, has bilateral tonsillar swelling/erythema/exudates right greater than left, uvula is midline, has anterior cervical lymphadenopathy, has full range of motion of the neck without meningeal signs, has no tongue elevation or protrusion, speaking in full sentences without difficulty. Will start on short course of antibiotics as well as administer Tylenol/dexamethasone here in the ED. Plan will be for discharge with close outpatient follow-up. Stable for discharge and agreeable to the plan.   Strict return ED precautions given    ED Course         Critical Care Time  Procedures

## 2023-09-16 NOTE — ED PROVIDER NOTES
=History  Chief Complaint   Patient presents with   • Sore Throat     Cough, congestion, sore throat x 3 days. Last tylenol 0600. HPI    52-year-old female presents with sore throat, ear pain and headache. Symptoms began about 3 days ago, associated with ear pain, sore throat, dry cough, subjective fevers and chills. She has been taking Tylenol  for the pain, and her last dose was at 6am today. Denies sick contacts, difficulty swallowing,  SOB, chest pain. Prior to Admission Medications   Prescriptions Last Dose Informant Patient Reported? Taking?    Cholecalciferol (Vitamin D) 50 MCG (2000 UT) tablet  Self Yes No   Sig: Take 2,000 Units by mouth daily   EPINEPHrine (EPIPEN) 0.3 mg/0.3 mL SOAJ  Self No No   Sig: Inject 0.3 mL (0.3 mg total) into a muscle once as needed for anaphylaxis for up to 1 dose   Multiple Vitamin (multivitamin) tablet  Self No No   Sig: Take 1 tablet by mouth daily   hyoscyamine (ANASPAZ,LEVSIN) 0.125 MG tablet  Self No No   Sig: Take 1 tablet (0.125 mg total) by mouth every 6 (six) hours as needed for cramping   ibuprofen (MOTRIN) 600 mg tablet  Self No No   Sig: Take 1 tablet (600 mg total) by mouth every 6 (six) hours as needed for mild pain   loratadine (CLARITIN) 10 mg tablet  Self No No   Sig: TAKE 1 TABLET BY MOUTH EVERY DAY AS NEEDED FOR ALLERGY   medroxyPROGESTERone (DEPO-PROVERA) 150 mg/mL injection  Self No No   Sig: Inject 1 mL (150 mg total) into a muscle every 3 (three) months   medroxyPROGESTERone acetate (DEPO-PROVERA SYRINGE) 150 mg/mL injection  Self Yes No   Sig: INJECT 1 ML (150 MG TOTAL) INTO A MUSCLE EVERY 3 (THREE) MONTHS   Patient not taking: Reported on 5/18/2023   omeprazole (PriLOSEC) 40 MG capsule  Self No No   Sig: Take 1 capsule (40 mg total) by mouth 2 (two) times a day   tiZANidine (ZANAFLEX) 4 mg tablet  Self No No   Sig: TAKE 1 TABLET (4 MG TOTAL) BY MOUTH EVERY 8 (EIGHT) HOURS AS NEEDED FOR MUSCLE SPASMS      Facility-Administered Medications Last Administration Doses Remaining   medroxyPROGESTERone (DEPO-PROVERA) IM injection 150 mg 7/31/2023  2:21 PM           Past Medical History:   Diagnosis Date   • GERD (gastroesophageal reflux disease)    • Migraine     takes excederine migraine       Past Surgical History:   Procedure Laterality Date   • DENTAL SURGERY     • ENDOMETRIAL ABLATION N/A 4/1/2022    Procedure: ABLATION ENDOMETRIAL Juan Morton;  Surgeon: Aicha Shepherd MD;  Location: [facility];  Service: Gynecology   • ME HYSTEROSCOPY BX ENDOMETRIUM&/POLYPC W/WO D&C N/A 4/1/2022    Procedure: DILATATION AND CURETTAGE (D&C) WITH HYSTEROSCOPY;  Surgeon: Aicha Shepherd MD;  Location: [facility];  Service: Gynecology   • ME REMOVAL INTRAUTERINE DEVICE IUD N/A 4/1/2022    Procedure: REMOVAL OF INTRAUTERINE DEVICE (IUD);   Surgeon: Aicha Shepherd MD;  Location: [facility];  Service: Gynecology       Family History   Problem Relation Age of Onset   • Breast cancer Mother         58 y/o    • BRCA1 Negative Mother    • Lung cancer Mother 77   • Diabetes Father    • Hypertension Father    • No Known Problems Daughter    • Breast cancer Maternal Grandmother 46   • Lung cancer Maternal Grandmother 59   • Alzheimer's disease Maternal Grandfather    • Heart disease Paternal Grandmother    • No Known Problems Paternal Grandfather    • No Known Problems Brother    • No Known Problems Brother    • No Known Problems Brother    • No Known Problems Son    • No Known Problems Son    • No Known Problems Maternal Aunt    • No Known Problems Maternal Aunt    • No Known Problems Maternal Aunt    • No Known Problems Maternal Aunt    • Lung cancer Maternal Uncle 48   • No Known Problems Paternal Aunt    • No Known Problems Paternal Aunt    • No Known Problems Paternal Aunt    • No Known Problems Paternal Aunt    • Breast cancer Cousin         age dx unknown   • Throat cancer Cousin 39   • BRCA1 Positive Cousin    • Kidney cancer Cousin 39     I have reviewed and agree with the history as documented. E-Cigarette/Vaping   • E-Cigarette Use Never User      E-Cigarette/Vaping Substances   • Nicotine No    • THC No    • CBD No    • Flavoring No    • Other No    • Unknown No      Social History     Tobacco Use   • Smoking status: Never   • Smokeless tobacco: Never   Vaping Use   • Vaping Use: Never used   Substance Use Topics   • Alcohol use: Yes     Comment: social   • Drug use: No        Review of Systems   Constitutional: Positive for fatigue and fever (subjective). Negative for chills. HENT: Positive for congestion, ear pain and sore throat. Negative for rhinorrhea and voice change. Eyes: Negative for photophobia and visual disturbance. Respiratory: Positive for cough (dry, nonproductive). Negative for shortness of breath. Cardiovascular: Negative for chest pain. Gastrointestinal: Negative for abdominal pain, nausea and vomiting. Genitourinary: Negative for dysuria and hematuria. Musculoskeletal: Negative for myalgias. Skin: Negative for color change. Neurological: Negative for dizziness, syncope and headaches. All other systems reviewed and are negative. Physical Exam  ED Triage Vitals [09/16/23 1251]   Temperature Pulse Respirations Blood Pressure SpO2   98.1 °F (36.7 °C) 86 20 106/68 97 %      Temp Source Heart Rate Source Patient Position - Orthostatic VS BP Location FiO2 (%)   Oral Monitor Sitting Right arm --      Pain Score       --             Orthostatic Vital Signs  Vitals:    09/16/23 1251   BP: 106/68   Pulse: 86   Patient Position - Orthostatic VS: Sitting       Physical Exam  Vitals and nursing note reviewed. Constitutional:       General: She is not in acute distress. Appearance: She is well-developed. HENT:      Head: Normocephalic and atraumatic. Right Ear: Tympanic membrane normal.      Left Ear: Tympanic membrane normal.      Mouth/Throat:      Mouth: Mucous membranes are moist.      Pharynx: Uvula midline.  Posterior oropharyngeal erythema present. Tonsils: 2+ on the right. 2+ on the left. Eyes:      Conjunctiva/sclera: Conjunctivae normal.      Pupils: Pupils are equal, round, and reactive to light. Cardiovascular:      Rate and Rhythm: Normal rate and regular rhythm. Heart sounds: No murmur heard. Pulmonary:      Effort: Pulmonary effort is normal. No respiratory distress. Breath sounds: Normal breath sounds. Abdominal:      Palpations: Abdomen is soft. Tenderness: There is no abdominal tenderness. Musculoskeletal:         General: No swelling. Cervical back: Neck supple. Lymphadenopathy:      Cervical: Cervical adenopathy present. Skin:     General: Skin is warm and dry. Capillary Refill: Capillary refill takes less than 2 seconds. Neurological:      Mental Status: She is alert. Psychiatric:         Mood and Affect: Mood normal.         ED Medications  Medications   acetaminophen (TYLENOL) tablet 650 mg (650 mg Oral Given 9/16/23 1400)   amoxicillin (AMOXIL) capsule 500 mg (500 mg Oral Given 9/16/23 1400)   dexamethasone (DECADRON) tablet 12 mg (12 mg Oral Given 9/16/23 1401)       Diagnostic Studies  Results Reviewed     None                 No orders to display         Procedures  Procedures      ED Course  ED Course as of 09/21/23 0232   Sat Sep 16, 2023   152 31-year-old female presents with sore throat, ear pain and headache. Symptoms began about 3 days ago, associated with ear pain, sore throat, dry cough, subjective fevers and chills. She has been taking Tylenol  for the pain, and her last dose was at 6am today. Denies sick contacts, difficulty swallowing,  SOB, chest pain. 1322 On examination, bilateral tonsillar swelling and erythema noted. Tonsillar exudate noted on R tonsil. Bilateral cervical lymphadenopathy. 1413 Patient started on 5-day course of amoxicillin 500 mg twice daily, first dose given in ED. Decadron 12 mg, Tylenol also given.   Discussed return precautions with patient, and will have her follow-up with PCP next week. MDM   See ED Course. Disposition  Final diagnoses: Tonsillitis   Sore throat   Pharyngitis     Time reflects when diagnosis was documented in both MDM as applicable and the Disposition within this note     Time User Action Codes Description Comment    9/16/2023  2:12 PM Sherly Monroy Add [J03.90] Tonsillitis     9/16/2023  2:17 PM Wilmre Wagner Peñaloza Add [J02.9] Sore throat     9/16/2023  2:18 PM Sherly Porfirio Add [J02.9] Pharyngitis       ED Disposition     ED Disposition   Discharge    Condition   Stable    Date/Time   Sat Sep 16, 2023  2:11 PM    Comment   Sandra Bone discharge to home/self care. Follow-up Information     Follow up With Specialties Details Why [address], DO Internal Medicine In 1 week  [address] [place] [address]  [phone]            Discharge Medication List as of 9/16/2023  2:18 PM      START taking these medications    Details   amoxicillin (AMOXIL) 500 mg capsule Take 1 capsule (500 mg total) by mouth every 12 (twelve) hours for 5 days, Starting Sat 9/16/2023, Until Thu 9/21/2023, Normal         CONTINUE these medications which have NOT CHANGED    Details   Cholecalciferol (Vitamin D) 50 MCG (2000 UT) tablet Take 2,000 Units by mouth daily, Historical Med      EPINEPHrine (EPIPEN) 0.3 mg/0.3 mL SOAJ Inject 0.3 mL (0.3 mg total) into a muscle once as needed for anaphylaxis for up to 1 dose, Starting Wed 6/14/2023, Normal      hyoscyamine (ANASPAZ,LEVSIN) 0.125 MG tablet Take 1 tablet (0.125 mg total) by mouth every 6 (six) hours as needed for cramping, Starting Mon 9/11/2023, Normal      ibuprofen (MOTRIN) 600 mg tablet Take 1 tablet (600 mg total) by mouth every 6 (six) hours as needed for mild pain, Starting Tue 2/28/2023, Normal      loratadine (CLARITIN) 10 mg tablet TAKE 1 TABLET BY MOUTH EVERY DAY AS NEEDED FOR ALLERGY, Normal      !! medroxyPROGESTERone (DEPO-PROVERA) 150 mg/mL injection Inject 1 mL (150 mg total) into a muscle every 3 (three) months, Starting Thu 12/8/2022, Normal      !! medroxyPROGESTERone acetate (DEPO-PROVERA SYRINGE) 150 mg/mL injection INJECT 1 ML (150 MG TOTAL) INTO A MUSCLE EVERY 3 (THREE) MONTHS, Historical Med      Multiple Vitamin (multivitamin) tablet Take 1 tablet by mouth daily, Starting Tue 8/2/2022, Normal      omeprazole (PriLOSEC) 40 MG capsule Take 1 capsule (40 mg total) by mouth 2 (two) times a day, Starting Fri 8/4/2023, Normal      tiZANidine (ZANAFLEX) 4 mg tablet TAKE 1 TABLET (4 MG TOTAL) BY MOUTH EVERY 8 (EIGHT) HOURS AS NEEDED FOR MUSCLE SPASMS, Starting Thu 7/6/2023, Normal       !! - Potential duplicate medications found. Please discuss with provider. No discharge procedures on file. PDMP Review     None           ED Provider  Attending physically available and evaluated Mozella Kawasaki. I managed the patient along with the ED Attending.     Electronically Signed by         Rufina Romberg, MD  09/21/23 7087

## 2023-10-10 ENCOUNTER — NURSE TRIAGE (OUTPATIENT)
Age: 48
End: 2023-10-10

## 2023-10-10 ENCOUNTER — OFFICE VISIT (OUTPATIENT)
Dept: GASTROENTEROLOGY | Facility: CLINIC | Age: 48
End: 2023-10-10
Payer: COMMERCIAL

## 2023-10-10 DIAGNOSIS — K63.8219 SMALL INTESTINAL BACTERIAL OVERGROWTH (SIBO): ICD-10-CM

## 2023-10-10 DIAGNOSIS — R14.0 BLOATING: Primary | ICD-10-CM

## 2023-10-10 PROCEDURE — 91065 BREATH HYDROGEN/METHANE TEST: CPT | Performed by: INTERNAL MEDICINE

## 2023-10-10 RX ORDER — NEOMYCIN SULFATE 500 MG/1
500 TABLET ORAL 2 TIMES DAILY
Qty: 28 TABLET | Refills: 0 | Status: SHIPPED | OUTPATIENT
Start: 2023-10-10 | End: 2023-10-24

## 2023-10-10 NOTE — PROGRESS NOTES
[facility]       Bacterial Overgrowth Analytical Record    Anaid Ochoa 53 y.o. female MRN: [id number]      Date of Test: 10/02/2023    Substrate Given: Lactulose    Ordering Provider: Dr Rolande Babinski Assistant: Luz Marina Schmidt. Symptoms: bloating    The patient presents for bacterial overgrowth testing. Patient fasted overnight. Baseline readings obtained. Breath test performed every 20 min for a total of 3 hr    Sample Clock Time ppmH2 ppmCH4 Co2% Jazmyn   Baseline   8:20am 3 8 3.3 1.66   #1  20 minutes 8:40am 3 6 3.5 1.57   #2  40 minutes 9:00am 4 10 3.7 1.48   #3  60 minutes 9:20am 21 14 3.2 1.71   #4  80 minutes 9:40am 26 14 3.4 1.61   #5  100 minutes 10:00am 37 15 3.3 1.66   #6  120 minutes 10:20am 24 15 3.2 1.71   #7  140 minutes 10:40am 61 19 3.4 1.61   #8  160 minutes 11:00am 38 16 4.0 1.37   #9  180 minutes 11:20am 81 22 3.3 1.66       Physician interpretation: Positive test for SIBO and IMO. Treat with xifaxan and neomycin for 14 days.

## 2023-10-10 NOTE — TELEPHONE ENCOUNTER
Patient and daughter calling into today after pharmacy called and notified that two new prescriptions were ready. Patient and daughter made aware that patient is positive to SIBO and IMO. She was instructed to start the medications at the same time.   All questions answered for both patient and her daughter

## 2023-10-11 ENCOUNTER — VBI (OUTPATIENT)
Dept: ADMINISTRATIVE | Facility: OTHER | Age: 48
End: 2023-10-11

## 2023-10-13 ENCOUNTER — TELEPHONE (OUTPATIENT)
Age: 48
End: 2023-10-13

## 2023-10-13 NOTE — TELEPHONE ENCOUNTER
Patients GI provider:  Dr. Nivia Cardoza    Number to return call: [phone]    Reason for call: Pt daughter calling regarding medications. Buster Lundborg stated the Xifaxan needs to be [place].   Patient hasn't started the Mycifradin due to medication needing to be taken together     Scheduled procedure/appointment date if applicable: 39/01/2530 with

## 2023-10-16 ENCOUNTER — ANNUAL EXAM (OUTPATIENT)
Dept: OBGYN CLINIC | Facility: CLINIC | Age: 48
End: 2023-10-16

## 2023-10-16 VITALS
HEIGHT: 57 IN | WEIGHT: 153 LBS | HEART RATE: 94 BPM | DIASTOLIC BLOOD PRESSURE: 65 MMHG | BODY MASS INDEX: 33.01 KG/M2 | SYSTOLIC BLOOD PRESSURE: 113 MMHG

## 2023-10-16 DIAGNOSIS — Z11.3 SCREEN FOR STD (SEXUALLY TRANSMITTED DISEASE): ICD-10-CM

## 2023-10-16 DIAGNOSIS — Z30.013 ENCOUNTER FOR INITIAL PRESCRIPTION OF INJECTABLE CONTRACEPTIVE: Primary | ICD-10-CM

## 2023-10-16 PROCEDURE — 87591 N.GONORRHOEAE DNA AMP PROB: CPT

## 2023-10-16 PROCEDURE — 87491 CHLMYD TRACH DNA AMP PROBE: CPT

## 2023-10-16 RX ORDER — MEDROXYPROGESTERONE ACETATE 150 MG/ML
150 INJECTION, SUSPENSION INTRAMUSCULAR
Qty: 1 ML | Refills: 3 | Status: CANCELLED | OUTPATIENT
Start: 2023-10-16 | End: 2024-07-13

## 2023-10-16 NOTE — TELEPHONE ENCOUNTER
Patients GI provider:  Dr. Martinez Weber    Number to return call: [phone]    Reason for call: Pt calling to check on medication auth. Friday's message forwarded incorrectly. Please obtain asap. For patient and please call her when complete.     Scheduled procedure/appointment date if applicable: 85/95/89

## 2023-10-16 NOTE — PROGRESS NOTES
OB/GYN ANNUAL H&P    SUBJECTIVE:    Bebeto Heck is a 53 y.o. [id number] female who presents for annual well woman H&P.    GYN:  No periods since endometrial ablation and depo  Abnormal vaginal discharge: denies  Genital lesions: denies  Genital irritation or itching: denies  Sexually active: yes with partner  Dyspareunia: denies  Contraception: depo injection  STI history: denies  Gynecologic surgical history: endometrial ablation in 2022    OB:  [id number] female, 3 vaginal deliveries   Prior pregnancies uncomplicated    :  Dysuria: denies  Hematuria: denies  Urgency: denies  Frequency: denies  Urinary incontinence: occasional stress incontinence     Breast:  Mass: L breast, had since 14 yo  Skin changes: denies  Reddening: denies  Dimpling: denies  Pain: denies  Nipple discharge: denies    General:  Diet: well balanced  Exercise: denies  Work: going to school to be an  at "[employer], full time student  Alcohol use: denies  Tobacco use: denies  Recreational drug use: denies    Family history:  Breast cancer: mom with breast cancer age 72 at dx  Endometrial cancer: maternal grandmother with endometrial cancer  Ovarian cancer: denies    Screening:  Cervical cancer  Last pap smear on 1/7/21 showed NILM, neg HPV. Next pap 2026  Breast cancer:  Last mammogram on 5/3/23 showed BIRAD 1  Colon cancer:  Last colonoscopy on 10/7/2021 benign, repeat in five years per patient  STI screening:   GC/CT, RPR, HIV, Hep B, Hep C    Review of Systems:  Pertinent items are noted in HPI. OBJECTIVE:    Vitals:   /65   Pulse 94   Ht 4' 9" (1.448 m)   Wt 69.4 kg (153 lb)   LMP  (LMP Unknown) Comment: Depo  BMI 33.11 kg/m²     Physical Exam  Constitutional:       General: She is not in acute distress. Appearance: Normal appearance. Genitourinary:      Vulva and rectum normal.      No lesions in the vagina.       Right Labia: No rash, lesions, skin changes or Bartholin's cyst.     Left Labia: No lesions, skin changes, Bartholin's cyst or rash. No vaginal bleeding or ulceration. No vaginal prolapse present. No vaginal atrophy present. Right Adnexa: not tender, not full and no mass present. Left Adnexa: not tender, not full and no mass present. No cervical motion tenderness, friability or lesion. Uterus is not enlarged, fixed or tender. Rectum:      No external hemorrhoid. Breasts:     Right: Normal. No swelling, bleeding, mass, nipple discharge, skin change or tenderness. Left: Normal. No swelling, bleeding, mass, nipple discharge, skin change or tenderness. HENT:      Head: Normocephalic and atraumatic. Mouth/Throat:      Mouth: Mucous membranes are moist.      Pharynx: Oropharynx is clear. No oropharyngeal exudate or posterior oropharyngeal erythema. Eyes:      General: No scleral icterus. Extraocular Movements: Extraocular movements intact. Cardiovascular:      Rate and Rhythm: Normal rate and regular rhythm. Pulses: Normal pulses. Heart sounds: Normal heart sounds. No murmur heard. No friction rub. No gallop. Pulmonary:      Effort: Pulmonary effort is normal. No respiratory distress. Breath sounds: Normal breath sounds. No wheezing, rhonchi or rales. Abdominal:      General: Abdomen is flat. There is no distension. Palpations: Abdomen is soft. Tenderness: There is no abdominal tenderness. There is no guarding. Musculoskeletal:      Right lower leg: No edema. Left lower leg: No edema. Lymphadenopathy:      Upper Body:      Right upper body: No supraclavicular or axillary adenopathy. Left upper body: No supraclavicular or axillary adenopathy. Neurological:      General: No focal deficit present. Mental Status: She is alert and oriented to person, place, and time. Skin:     General: Skin is warm and dry.    Psychiatric:         Mood and Affect: Mood normal.         Behavior: Behavior normal.         Thought Content: Thought content normal.         Judgment: Judgment normal.   Vitals and nursing note reviewed. Exam conducted with a chaperone present. ASSESSMENT:    Adarsh Man is a 53 y.o. [id number] with normal gynecologic exam.    PLAN:  - f/u GC/CT STI screening   - next Pap 2026  - discussed kegel exercises for stress incontinence. Return to clinic if symptoms become bothersome.  Can consider referrals to female pelvic medicine and PT          Sammy Zuniga MD  10/17/23  4:32 PM

## 2023-10-17 LAB
C TRACH DNA SPEC QL NAA+PROBE: NEGATIVE
N GONORRHOEA DNA SPEC QL NAA+PROBE: NEGATIVE

## 2023-10-17 NOTE — TELEPHONE ENCOUNTER
Rcvd fax from [facility] (insurance listed in pt's chart)  PA for Xifaxan cannot be done since pt has alternative benefits.   Scanned in media

## 2023-10-18 ENCOUNTER — CLINICAL SUPPORT (OUTPATIENT)
Dept: OBGYN CLINIC | Facility: CLINIC | Age: 48
End: 2023-10-18

## 2023-10-18 DIAGNOSIS — Z30.013 ENCOUNTER FOR INITIAL PRESCRIPTION OF INJECTABLE CONTRACEPTIVE: ICD-10-CM

## 2023-10-18 DIAGNOSIS — Z30.42 ENCOUNTER FOR MANAGEMENT AND INJECTION OF DEPO-PROVERA: Primary | ICD-10-CM

## 2023-10-18 RX ORDER — MEDROXYPROGESTERONE ACETATE 150 MG/ML
150 INJECTION, SUSPENSION INTRAMUSCULAR
Qty: 1 ML | Refills: 3 | Status: SHIPPED | OUTPATIENT
Start: 2023-10-18

## 2023-10-18 RX ADMIN — MEDROXYPROGESTERONE ACETATE 150 MG: 150 INJECTION, SUSPENSION INTRAMUSCULAR at 15:06

## 2023-10-18 NOTE — TELEPHONE ENCOUNTER
Patient called and stated she spoke with [employer] and fixed the issue and asked if we can please resend prior auth for Xifaxan thank you

## 2023-10-18 NOTE — PROGRESS NOTES
Depo-Provera     [x]   Patient provided box yes   3 Refills remain  Refills submitted no  Last  Annual Date / Birth control check : 10/16/2023  Last Depo date: 7/31/2023  Side effects: no  HCG: no  Given by: Daniel Garcia RN   Site: right deltoid    Calcium supplement daily teaching  Condoms for 2 weeks following first injection dose.

## 2023-10-18 NOTE — TELEPHONE ENCOUNTER
Resent PA for Xifaxan through [employer]  Same key:   [id number]  Clinicals were attached as before  Awaiting determination

## 2023-10-23 DIAGNOSIS — R10.13 DYSPEPSIA: ICD-10-CM

## 2023-10-23 RX ORDER — HYOSCYAMINE SULFATE 0.125 MG
125 TABLET ORAL EVERY 6 HOURS PRN
Qty: 90 TABLET | Refills: 1 | Status: SHIPPED | OUTPATIENT
Start: 2023-10-23

## 2023-10-23 NOTE — TELEPHONE ENCOUNTER
Patients GI provider:  Dr. Radha Zamudio    Number to return call: [phone]    Reason for call: Pt calling asking about Auth for Xifaxin. Advised her that the auth was resubmitted on 10/18/23. Please return her call with status, her symptoms are worsening.      Scheduled procedure/appointment date if applicable: 84/54/86

## 2023-10-24 NOTE — TELEPHONE ENCOUNTER
Rcvd fax  Xifaxan approved  Scanned in media  Faxed to pharmacy med was sent to  Advised pt via [facility]

## 2023-10-27 DIAGNOSIS — K21.9 GASTROESOPHAGEAL REFLUX DISEASE, UNSPECIFIED WHETHER ESOPHAGITIS PRESENT: ICD-10-CM

## 2023-10-27 RX ORDER — OMEPRAZOLE 40 MG/1
40 CAPSULE, DELAYED RELEASE ORAL 2 TIMES DAILY
Qty: 180 CAPSULE | Refills: 1 | Status: SHIPPED | OUTPATIENT
Start: 2023-10-27

## 2023-11-17 ENCOUNTER — OFFICE VISIT (OUTPATIENT)
Dept: GASTROENTEROLOGY | Facility: CLINIC | Age: 48
End: 2023-11-17
Payer: COMMERCIAL

## 2023-11-17 VITALS
SYSTOLIC BLOOD PRESSURE: 116 MMHG | DIASTOLIC BLOOD PRESSURE: 70 MMHG | HEIGHT: 57 IN | WEIGHT: 154 LBS | BODY MASS INDEX: 33.22 KG/M2 | TEMPERATURE: 99.6 F

## 2023-11-17 DIAGNOSIS — K21.9 GASTROESOPHAGEAL REFLUX DISEASE WITHOUT ESOPHAGITIS: ICD-10-CM

## 2023-11-17 DIAGNOSIS — K63.8219 SMALL INTESTINAL BACTERIAL OVERGROWTH (SIBO): ICD-10-CM

## 2023-11-17 DIAGNOSIS — K31.84 GASTROPARESIS: Primary | ICD-10-CM

## 2023-11-17 DIAGNOSIS — K58.0 IRRITABLE BOWEL SYNDROME WITH DIARRHEA: ICD-10-CM

## 2023-11-17 PROCEDURE — 99214 OFFICE O/P EST MOD 30 MIN: CPT | Performed by: INTERNAL MEDICINE

## 2023-11-17 NOTE — PROGRESS NOTES
[facility] - Outpatient Follow-up Note  Bebeto Heck 53 y.o. female MRN: [id number]  Encounter: [id number]          ASSESSMENT AND PLAN:      1. Gastroparesis  2. Gastroesophageal reflux disease without esophagitis  3. Irritable bowel syndrome with diarrhea  4. Small intestinal bacterial overgrowth (SIBO)    She reports significant improvement in her symptoms after course of Xifaxan and neomycin for 14 days. Abdominal bloating, gas, early satiety and constipation has improved. She has good appetite. Continue low FODMAP diet  Gas-X as needed for bloating   I reviewed diet and lifestyle precautions. This includes limiting coffee, soda, tomatoes, citrus, fatty and spicy foods. I recommend waiting 3 hours after dinner to lie down. I recommend eating small frequent meals as well as sleeping with head of bed elevated at night. Omeprazole 40 mg daily. Take 30 minutes before breakfast  Levsin as needed for abdominal pain  Small frequent diet    Follow-up in 6 months    ______________________________________________________________________    SUBJECTIVE: 81-year-old female with gastroparesis, gastroesophageal reflux disease, IBS-D, SIBO and IMO here for follow-up visit. Breath test positive for SIBO and IMO -she completed 14-day course of Xifaxan and neomycin    She reports significant improvement in abdominal bloating, gas and fullness  She is eating well. Denies early satiety  She takes Gas-X as needed  On omeprazole 40 mg daily  Levsin as needed      REVIEW OF SYSTEMS IS OTHERWISE NEGATIVE.       Historical Information   Past Medical History:   Diagnosis Date    GERD (gastroesophageal reflux disease)     Migraine     takes excederine migraine     Past Surgical History:   Procedure Laterality Date    DENTAL SURGERY      ENDOMETRIAL ABLATION N/A 4/1/2022    Procedure: ABLATION ENDOMETRIAL Dennisa Anuja;  Surgeon: Ira Dutton MD;  Location: [facility];  Service: Gynecology    FL HYSTEROSCOPY BX ENDOMETRIUM&/POLYPC W/WO D&C N/A 4/1/2022    Procedure: DILATATION AND CURETTAGE (D&C) WITH HYSTEROSCOPY;  Surgeon: Justin Alvarez MD;  Location: [facility];  Service: Gynecology    OR REMOVAL INTRAUTERINE DEVICE IUD N/A 4/1/2022    Procedure: REMOVAL OF INTRAUTERINE DEVICE (IUD);   Surgeon: Justin Alvarez MD;  Location: [facility];  Service: Gynecology     Social History   Social History     Substance and Sexual Activity   Alcohol Use Not Currently    Comment: social     Social History     Substance and Sexual Activity   Drug Use No     Social History     Tobacco Use   Smoking Status Never   Smokeless Tobacco Never     Family History   Problem Relation Age of Onset    Breast cancer Mother         60 y/o     BRCA1 Negative Mother     Lung cancer Mother 77    Diabetes Father     Hypertension Father     No Known Problems Daughter     Breast cancer Maternal Grandmother 46    Lung cancer Maternal Grandmother 59    Alzheimer's disease Maternal Grandfather     Heart disease Paternal Grandmother     No Known Problems Paternal Grandfather     No Known Problems Brother     No Known Problems Brother     No Known Problems Brother     No Known Problems Son     No Known Problems Son     No Known Problems Maternal Aunt     No Known Problems Maternal Aunt     No Known Problems Maternal Aunt     No Known Problems Maternal Aunt     Lung cancer Maternal Uncle 48    No Known Problems Paternal Aunt     No Known Problems Paternal Aunt     No Known Problems Paternal Aunt     No Known Problems Paternal Aunt     Breast cancer Cousin         age dx unknown    Throat cancer Cousin 39    BRCA1 Positive Cousin     Kidney cancer Cousin 39       Meds/Allergies       Current Outpatient Medications:     Cholecalciferol (Vitamin D) 50 MCG (2000 UT) tablet    EPINEPHrine (EPIPEN) 0.3 mg/0.3 mL SOAJ    hyoscyamine (ANASPAZ,LEVSIN) 0.125 MG tablet    ibuprofen (MOTRIN) 600 mg tablet    loratadine (CLARITIN) 10 mg tablet medroxyPROGESTERone (DEPO-PROVERA) 150 mg/mL injection    medroxyPROGESTERone acetate (DEPO-PROVERA SYRINGE) 150 mg/mL injection    Multiple Vitamin (multivitamin) tablet    omeprazole (PriLOSEC) 40 MG capsule    tiZANidine (ZANAFLEX) 4 mg tablet    Current Facility-Administered Medications:     medroxyPROGESTERone (DEPO-PROVERA) IM injection 150 mg, 150 mg, Intramuscular, Q3 Months, 150 mg at 10/18/23 1506    Allergies   Allergen Reactions    Bee Venom Anaphylaxis    Other Swelling     Patient states that she gets hives and swells when she comes in contact with clorox or bleach products. Objective     Blood pressure 116/70, temperature 99.6 °F (37.6 °C), temperature source Tympanic, height 4' 9" (1.448 m), weight 69.9 kg (154 lb), not currently breastfeeding. Body mass index is 33.33 kg/m². PHYSICAL EXAM:      General Appearance:   Alert, cooperative, no distress   HEENT:   Normocephalic, atraumatic, anicteric. Neck:  Supple, symmetrical, trachea midline   Lungs:   Clear to auscultation bilaterally; no rales, rhonchi or wheezing; respirations unlabored    Heart[de-identified]   Regular rate and rhythm; no murmur, rub, or gallop. Abdomen:   Soft, non-tender, non-distended; normal bowel sounds; no masses, no organomegaly    Genitalia:   Deferred    Rectal:   Deferred    Extremities:  No cyanosis, clubbing or edema    Pulses:  2+ and symmetric    Skin:  No jaundice, rashes, or lesions    Lymph nodes:  No palpable cervical lymphadenopathy        Lab Results:   No visits with results within 1 Day(s) from this visit. Latest known visit with results is:   Annual Exam on 10/16/2023   Component Date Value    N gonorrhoeae, DNA Probe 10/16/2023 Negative     Chlamydia trachomatis, D* 10/16/2023 Negative          Radiology Results:   No results found.     Answers submitted by the patient for this visit:  Abdominal Pain Questionnaire (Submitted on 11/10/2023)  Chief Complaint: Abdominal pain  Chronicity: new

## 2024-01-05 ENCOUNTER — CLINICAL SUPPORT (OUTPATIENT)
Dept: OBGYN CLINIC | Facility: CLINIC | Age: 49
End: 2024-01-05

## 2024-01-05 DIAGNOSIS — M54.16 LUMBAR RADICULOPATHY, ACUTE: ICD-10-CM

## 2024-01-05 DIAGNOSIS — Z30.42 ENCOUNTER FOR MANAGEMENT AND INJECTION OF DEPO-PROVERA: Primary | ICD-10-CM

## 2024-01-05 PROCEDURE — 96372 THER/PROPH/DIAG INJ SC/IM: CPT

## 2024-01-05 RX ORDER — TIZANIDINE 4 MG/1
4 TABLET ORAL EVERY 8 HOURS PRN
Qty: 270 TABLET | Refills: 1 | Status: SHIPPED | OUTPATIENT
Start: 2024-01-05

## 2024-01-05 RX ADMIN — MEDROXYPROGESTERONE ACETATE 150 MG: 150 INJECTION, SUSPENSION INTRAMUSCULAR at 09:10

## 2024-01-05 NOTE — TELEPHONE ENCOUNTER
Patient requested refill sent to [facility] on [address] In [place].

## 2024-01-05 NOTE — PROGRESS NOTES
Depo-Provera     [x]   Patient provided box yes   2 Refills remain  Refills submitted no  Last  Annual Date / Birth control check : 10/16/2023  Last Depo date: 10/18/2023  Side effects: no  HCG: no  Given by: Jackelyn Mcfarlane  Site: Right deltoid    Calcium supplement daily teaching  Condoms for 2 weeks following first injection dose.

## 2024-02-21 PROBLEM — Z01.419 WOMEN'S ANNUAL ROUTINE GYNECOLOGICAL EXAMINATION: Status: RESOLVED | Noted: 2021-01-07 | Resolved: 2024-02-21

## 2024-03-25 ENCOUNTER — CLINICAL SUPPORT (OUTPATIENT)
Dept: OBGYN CLINIC | Facility: CLINIC | Age: 49
End: 2024-03-25

## 2024-03-25 DIAGNOSIS — Z30.42 ENCOUNTER FOR MANAGEMENT AND INJECTION OF DEPO-PROVERA: Primary | ICD-10-CM

## 2024-03-25 PROCEDURE — 96372 THER/PROPH/DIAG INJ SC/IM: CPT

## 2024-03-25 RX ADMIN — MEDROXYPROGESTERONE ACETATE 150 MG: 150 INJECTION, SUSPENSION INTRAMUSCULAR at 15:42

## 2024-03-25 NOTE — PROGRESS NOTES
Depo-Provera     [x]   Patient provided box yes   1 Refills remain  Refills submitted no  Annual due  : 10/17/24  Last Depo date: 1/5/24  Side effects: no  HCG: no  if applicable:   Given by:K.Reyes  Site: DMITRIY    Calcium supplement daily teaching  Condoms for 2 weeks following first injection dose.

## 2024-04-26 ENCOUNTER — OFFICE VISIT (OUTPATIENT)
Dept: INTERNAL MEDICINE CLINIC | Facility: CLINIC | Age: 49
End: 2024-04-26

## 2024-04-26 ENCOUNTER — APPOINTMENT (OUTPATIENT)
Dept: LAB | Facility: CLINIC | Age: 49
End: 2024-04-26
Payer: COMMERCIAL

## 2024-04-26 VITALS
SYSTOLIC BLOOD PRESSURE: 126 MMHG | WEIGHT: 161.25 LBS | HEIGHT: 59 IN | TEMPERATURE: 97.7 F | DIASTOLIC BLOOD PRESSURE: 84 MMHG | BODY MASS INDEX: 32.51 KG/M2 | RESPIRATION RATE: 18 BRPM | OXYGEN SATURATION: 98 % | HEART RATE: 76 BPM

## 2024-04-26 DIAGNOSIS — M54.16 LUMBAR RADICULOPATHY: Primary | ICD-10-CM

## 2024-04-26 DIAGNOSIS — F43.10 PTSD (POST-TRAUMATIC STRESS DISORDER): ICD-10-CM

## 2024-04-26 DIAGNOSIS — Z83.438 FAMILY HISTORY OF HYPERLIPIDEMIA: ICD-10-CM

## 2024-04-26 DIAGNOSIS — E55.9 VITAMIN D DEFICIENCY: ICD-10-CM

## 2024-04-26 DIAGNOSIS — F32.1 MODERATE MAJOR DEPRESSION, SINGLE EPISODE (HCC): ICD-10-CM

## 2024-04-26 DIAGNOSIS — Z83.3 FAMILY HISTORY OF DIABETES MELLITUS IN FATHER: ICD-10-CM

## 2024-04-26 DIAGNOSIS — R20.2 PARESTHESIA OF HAND, BILATERAL: ICD-10-CM

## 2024-04-26 DIAGNOSIS — M54.2 CERVICALGIA: ICD-10-CM

## 2024-04-26 LAB
25(OH)D3 SERPL-MCNC: 14.1 NG/ML (ref 30–100)
ALBUMIN SERPL BCP-MCNC: 4.5 G/DL (ref 3.5–5)
ALP SERPL-CCNC: 81 U/L (ref 34–104)
ALT SERPL W P-5'-P-CCNC: 25 U/L (ref 7–52)
ANION GAP SERPL CALCULATED.3IONS-SCNC: 10 MMOL/L (ref 4–13)
AST SERPL W P-5'-P-CCNC: 18 U/L (ref 13–39)
BILIRUB SERPL-MCNC: 0.41 MG/DL (ref 0.2–1)
BUN SERPL-MCNC: 14 MG/DL (ref 5–25)
CALCIUM SERPL-MCNC: 9.3 MG/DL (ref 8.4–10.2)
CHLORIDE SERPL-SCNC: 106 MMOL/L (ref 96–108)
CHOLEST SERPL-MCNC: 163 MG/DL
CO2 SERPL-SCNC: 25 MMOL/L (ref 21–32)
CREAT SERPL-MCNC: 0.68 MG/DL (ref 0.6–1.3)
GFR SERPL CREATININE-BSD FRML MDRD: 104 ML/MIN/1.73SQ M
GLUCOSE P FAST SERPL-MCNC: 95 MG/DL (ref 65–99)
HDLC SERPL-MCNC: 49 MG/DL
LDLC SERPL CALC-MCNC: 97 MG/DL (ref 0–100)
NONHDLC SERPL-MCNC: 114 MG/DL
POTASSIUM SERPL-SCNC: 3.9 MMOL/L (ref 3.5–5.3)
PROT SERPL-MCNC: 7.4 G/DL (ref 6.4–8.4)
SODIUM SERPL-SCNC: 141 MMOL/L (ref 135–147)
TRIGL SERPL-MCNC: 83 MG/DL

## 2024-04-26 PROCEDURE — 82306 VITAMIN D 25 HYDROXY: CPT

## 2024-04-26 PROCEDURE — 36415 COLL VENOUS BLD VENIPUNCTURE: CPT

## 2024-04-26 PROCEDURE — 80061 LIPID PANEL: CPT

## 2024-04-26 PROCEDURE — 80053 COMPREHEN METABOLIC PANEL: CPT

## 2024-04-26 PROCEDURE — 99214 OFFICE O/P EST MOD 30 MIN: CPT | Performed by: FAMILY MEDICINE

## 2024-04-26 RX ORDER — ESCITALOPRAM OXALATE 10 MG/1
10 TABLET ORAL DAILY
Qty: 30 TABLET | Refills: 2 | Status: SHIPPED | OUTPATIENT
Start: 2024-04-26

## 2024-04-26 NOTE — PATIENT INSTRUCTIONS
Depression   AMBULATORY CARE:   Depression  is a mood disorder that causes feelings of sadness or hopelessness that do not go away. Depression may cause you to lose interest in things you used to enjoy. These feelings may interfere with your daily life.  Common signs and symptoms:   Appetite changes, or weight gain or loss    Trouble falling or staying asleep, or sleeping too much    Fatigue (being mentally and physically tired) or lack of energy    Feeling restless, irritable, or withdrawn    Feeling worthless, hopeless, discouraged, or guilty    Trouble concentrating, remembering things, doing daily tasks, or making decisions    Thoughts about hurting or killing yourself    Call your local emergency number (911 in the [place]) if:   You think about hurting yourself or someone else.    You have done something on purpose to hurt yourself.    Call your therapist or doctor if:   Your symptoms get worse or do not get better with treatment.    Your depression keeps you from doing your regular daily activities.    You have new symptoms since your last visit.    You have questions or concerns about your condition or care.    The following resources are available at any time to help you, if needed:   Contact a suicide prevention organization:        For the [employer] Suicide and Crisis Lifeline:     Call or text [employer]     Send a chat on https://OnApp.org/chat     Call 0-693-310-6552 (1-800-273-TALK)    For the Suicide Hotline, call 3-643-680-9546 (0-276-YXFSZRD)    For a list of international numbers: https://save.org/find-help/international-resources/  Treatment for depression  depends on how severe your symptoms are. You may need any of the following:  Cognitive behavioral therapy (CBT)  teaches you how to identify and change negative thought patterns.    Antidepressant medicine  may be given to decrease or manage symptoms. You may need to take this medicine for several weeks before they start working.    Self-care:   Talk to  someone about your depression.  Your healthcare provider may suggest counseling. You might feel more comfortable talking with a friend or family member about your depression. Choose someone you know will be supportive and encouraging.    Get regular physical activity.  Physical activity can lower your stress, improve your mood, and help you sleep better. Work with your healthcare provider to develop a plan that you enjoy.         Create a regular sleep schedule.  A routine can help you relax before bed. Listen to music, read, or do yoga. Try to go to bed and wake up at the same time every day. Sleep is important for emotional health.    Eat a variety of healthy foods.  Healthy foods include fruits, vegetables, whole-grain breads, low-fat dairy products, lean meats, fish, and cooked beans. A healthy meal plan is low in fat, salt, and added sugar.         Do not use alcohol, drugs, or nicotine products.  These can worsen depression or make it hard to manage. Talk to your therapist or healthcare provider if you use any of these products and need help to quit.    Follow up with your therapist or doctor as directed:  Your healthcare provider will monitor your progress at follow-up visits. Your provider will also monitor your medicine if you take antidepressants and ask if the medicine is helping. Tell your provider about any side effects or problems you have with your medicine. The type or amount of medicine may need to be changed. Write down your questions so you remember to ask them during your visits.  For more information or support:   National [place] on Mental Illness  [address]  [place] [address]  Phone: 1- 129 - 301-3333  Phone: 4- 559 - 330-9515  Web Address: http://www.petey.org  98 Suicide and Crisis Lifeline  [address]  [place] [address]  Phone: 9- 367 - 319  Web Address: http://www.suicidepreventionlifeline.org OR https://Picovico.org/chat/    © Copyright Merative 2023  Information is for End User's use only and may not be sold, redistributed or otherwise used for commercial purposes.  The above information is an  only. It is not intended as medical advice for individual conditions or treatments. Talk to your doctor, nurse or pharmacist before following any medical regimen to see if it is safe and effective for you.    Depression   AMBULATORY CARE:   Depression  is a mood disorder that causes feelings of sadness or hopelessness that do not go away. Depression may cause you to lose interest in things you used to enjoy. These feelings may interfere with your daily life.  Common signs and symptoms:   Appetite changes, or weight gain or loss    Trouble falling or staying asleep, or sleeping too much    Fatigue (being mentally and physically tired) or lack of energy    Feeling restless, irritable, or withdrawn    Feeling worthless, hopeless, discouraged, or guilty    Trouble concentrating, remembering things, doing daily tasks, or making decisions    Thoughts about hurting or killing yourself    Call your local emergency number (911 in the ) if:   You think about hurting yourself or someone else.    You have done something on purpose to hurt yourself.    Call your therapist or doctor if:   Your symptoms get worse or do not get better with treatment.    Your depression keeps you from doing your regular daily activities.    You have new symptoms since your last visit.    You have questions or concerns about your condition or care.    The following resources are available at any time to help you, if needed:   Contact a suicide prevention organization:        For the 988 Suicide and Crisis Lifeline:     Call or text [employer]     Send a chat on https://98eTutorline.org/chat     Call 0-638-106-9071 (1-800-273-TALK)    For the Suicide Hotline, call 2-546-535-0323 (8-173-UKRAWRH)    For a list of international numbers: https://save.org/find-help/international-resources/  Treatment  for depression  depends on how severe your symptoms are. You may need any of the following:  Cognitive behavioral therapy (CBT)  teaches you how to identify and change negative thought patterns.    Antidepressant medicine  may be given to decrease or manage symptoms. You may need to take this medicine for several weeks before they start working.    Self-care:   Talk to someone about your depression.  Your healthcare provider may suggest counseling. You might feel more comfortable talking with a friend or family member about your depression. Choose someone you know will be supportive and encouraging.    Get regular physical activity.  Physical activity can lower your stress, improve your mood, and help you sleep better. Work with your healthcare provider to develop a plan that you enjoy.         Create a regular sleep schedule.  A routine can help you relax before bed. Listen to music, read, or do yoga. Try to go to bed and wake up at the same time every day. Sleep is important for emotional health.    Eat a variety of healthy foods.  Healthy foods include fruits, vegetables, whole-grain breads, low-fat dairy products, lean meats, fish, and cooked beans. A healthy meal plan is low in fat, salt, and added sugar.         Do not use alcohol, drugs, or nicotine products.  These can worsen depression or make it hard to manage. Talk to your therapist or healthcare provider if you use any of these products and need help to quit.    Follow up with your therapist or doctor as directed:  Your healthcare provider will monitor your progress at follow-up visits. Your provider will also monitor your medicine if you take antidepressants and ask if the medicine is helping. Tell your provider about any side effects or problems you have with your medicine. The type or amount of medicine may need to be changed. Write down your questions so you remember to ask them during your visits.  For more information or support:   National  [place] on Mental Illness  [address]  [place] [address]  Phone: 5- 513 - 664-2247  Phone: 1- 723 - 299-8730  Web Address: http://www.petey.Coolio  988 Suicide and Crisis Lifeline  [address]  [place] [address]  Phone: 1- 376 - 436  Web Address: http://www.suicidepreventionlifeline.org OR https://Haute App/chat/    © Copyright Merative 2023 Information is for End User's use only and may not be sold, redistributed or otherwise used for commercial purposes.  The above information is an  only. It is not intended as medical advice for individual conditions or treatments. Talk to your doctor, nurse or pharmacist before following any medical regimen to see if it is safe and effective for you.
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
## 2024-04-26 NOTE — ASSESSMENT & PLAN NOTE
Avoid offending positions actions ,moist heat applications as needed , zanaflex hs as needed , check C spine xray

## 2024-04-26 NOTE — ASSESSMENT & PLAN NOTE
Pt has had  L neck pain for some time , hand numbness L > R last month or so check C spine xray , avoid offending positions actions

## 2024-04-26 NOTE — ASSESSMENT & PLAN NOTE
See detailed HPI , avoid offending positions actions , moist heat liberally , zanaflex hs prn , re visit course of P.T , will request physiatry eval as well
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
## 2024-05-02 ENCOUNTER — HOSPITAL ENCOUNTER (OUTPATIENT)
Dept: RADIOLOGY | Facility: HOSPITAL | Age: 49
Discharge: HOME | End: 2024-05-02
Payer: COMMERCIAL

## 2024-05-02 DIAGNOSIS — E55.9 VITAMIN D DEFICIENCY: Primary | ICD-10-CM

## 2024-05-02 DIAGNOSIS — M54.2 CERVICALGIA: ICD-10-CM

## 2024-05-02 PROCEDURE — 72050 X-RAY EXAM NECK SPINE 4/5VWS: CPT

## 2024-05-02 RX ORDER — ERGOCALCIFEROL 1.25 MG/1
50000 CAPSULE ORAL WEEKLY
Qty: 12 CAPSULE | Refills: 0 | Status: SHIPPED | OUTPATIENT
Start: 2024-05-02

## 2024-05-03 ENCOUNTER — HOSPITAL ENCOUNTER (OUTPATIENT)
Dept: RADIOLOGY | Age: 49
Discharge: HOME | End: 2024-05-03
Payer: COMMERCIAL

## 2024-05-03 ENCOUNTER — OFFICE VISIT (OUTPATIENT)
Dept: GASTROENTEROLOGY | Facility: CLINIC | Age: 49
End: 2024-05-03

## 2024-05-03 ENCOUNTER — APPOINTMENT (OUTPATIENT)
Dept: LAB | Facility: CLINIC | Age: 49
End: 2024-05-03
Payer: COMMERCIAL

## 2024-05-03 VITALS
WEIGHT: 159 LBS | BODY MASS INDEX: 32.05 KG/M2 | TEMPERATURE: 99.3 F | DIASTOLIC BLOOD PRESSURE: 70 MMHG | SYSTOLIC BLOOD PRESSURE: 108 MMHG | HEIGHT: 59 IN

## 2024-05-03 DIAGNOSIS — K80.20 CALCULUS OF GALLBLADDER WITHOUT CHOLECYSTITIS WITHOUT OBSTRUCTION: ICD-10-CM

## 2024-05-03 DIAGNOSIS — K21.9 GASTROESOPHAGEAL REFLUX DISEASE WITHOUT ESOPHAGITIS: ICD-10-CM

## 2024-05-03 DIAGNOSIS — R10.13 EPIGASTRIC PAIN: Primary | ICD-10-CM

## 2024-05-03 DIAGNOSIS — R19.7 DIARRHEA, UNSPECIFIED TYPE: ICD-10-CM

## 2024-05-03 DIAGNOSIS — R10.13 EPIGASTRIC PAIN: ICD-10-CM

## 2024-05-03 DIAGNOSIS — K31.84 GASTROPARESIS: ICD-10-CM

## 2024-05-03 LAB
ALBUMIN SERPL BCP-MCNC: 4.2 G/DL (ref 3.5–5)
ALP SERPL-CCNC: 80 U/L (ref 34–104)
ALT SERPL W P-5'-P-CCNC: 22 U/L (ref 7–52)
ANION GAP SERPL CALCULATED.3IONS-SCNC: 6 MMOL/L (ref 4–13)
AST SERPL W P-5'-P-CCNC: 17 U/L (ref 13–39)
BILIRUB SERPL-MCNC: 0.28 MG/DL (ref 0.2–1)
BUN SERPL-MCNC: 19 MG/DL (ref 5–25)
CALCIUM SERPL-MCNC: 8.9 MG/DL (ref 8.4–10.2)
CHLORIDE SERPL-SCNC: 107 MMOL/L (ref 96–108)
CO2 SERPL-SCNC: 25 MMOL/L (ref 21–32)
CREAT SERPL-MCNC: 0.69 MG/DL (ref 0.6–1.3)
ERYTHROCYTE [DISTWIDTH] IN BLOOD BY AUTOMATED COUNT: 12.7 % (ref 11.6–15.1)
GFR SERPL CREATININE-BSD FRML MDRD: 103 ML/MIN/1.73SQ M
GLUCOSE SERPL-MCNC: 89 MG/DL (ref 65–140)
HCT VFR BLD AUTO: 42.2 % (ref 34.8–46.1)
HGB BLD-MCNC: 13.6 G/DL (ref 11.5–15.4)
MCH RBC QN AUTO: 29.8 PG (ref 26.8–34.3)
MCHC RBC AUTO-ENTMCNC: 32.2 G/DL (ref 31.4–37.4)
MCV RBC AUTO: 93 FL (ref 82–98)
PLATELET # BLD AUTO: 305 THOUSANDS/UL (ref 149–390)
PMV BLD AUTO: 10.5 FL (ref 8.9–12.7)
POTASSIUM SERPL-SCNC: 3.9 MMOL/L (ref 3.5–5.3)
PROT SERPL-MCNC: 7.4 G/DL (ref 6.4–8.4)
RBC # BLD AUTO: 4.56 MILLION/UL (ref 3.81–5.12)
SODIUM SERPL-SCNC: 138 MMOL/L (ref 135–147)
WBC # BLD AUTO: 10.37 THOUSAND/UL (ref 4.31–10.16)

## 2024-05-03 PROCEDURE — 87177 OVA AND PARASITES SMEARS: CPT

## 2024-05-03 PROCEDURE — 36415 COLL VENOUS BLD VENIPUNCTURE: CPT

## 2024-05-03 PROCEDURE — 87329 GIARDIA AG IA: CPT

## 2024-05-03 PROCEDURE — 76705 ECHO EXAM OF ABDOMEN: CPT

## 2024-05-03 PROCEDURE — 87209 SMEAR COMPLEX STAIN: CPT

## 2024-05-03 PROCEDURE — 87493 C DIFF AMPLIFIED PROBE: CPT

## 2024-05-03 PROCEDURE — 85027 COMPLETE CBC AUTOMATED: CPT

## 2024-05-03 PROCEDURE — 87505 NFCT AGENT DETECTION GI: CPT

## 2024-05-03 PROCEDURE — 80053 COMPREHEN METABOLIC PANEL: CPT

## 2024-05-03 RX ORDER — FAMOTIDINE 40 MG/1
40 TABLET, FILM COATED ORAL
Qty: 30 TABLET | Refills: 3 | Status: SHIPPED | OUTPATIENT
Start: 2024-05-03

## 2024-05-03 NOTE — RESULT ENCOUNTER NOTE
I saw this patient in the office this morning and ordered stat labs and ultrasound.    Please call the patient and let her know that I reviewed her blood work results.  Good news, everything overall looks great.  Her white blood cells are slightly increased but overall everything else looks great.  She does not have any evidence of anemia.  Her electrolytes, kidney function, liver enzymes are all normal.  This is reassuring.  Her abdominal ultrasound also looks okay.  She does have evidence of gallstones without evidence for acute cholecystitis or obstruction.  This is very good news.  The gallstones, however, could still be a cause of her ongoing intermittent abdominal pain.  I think she would benefit from seeing general surgery at some point to potentially discuss gallbladder removal.  I will place this referral in the system.    Otherwise, I recommend she complete the stool studies as ordered and continue with omeprazole 40 mg once daily in the morning.  I recommend that she start famotidine 40 mg once daily at bedtime in addition to this.  I sent this medication to her [facility] pharmacy.  This would help her abdominal pain if it is secondary to her known GERD/gastroparesis.  If her abdominal pain persists after another 2 weeks or so, patient should call the office and let us know as we may want to consider a repeat EGD as we discussed in the office today.    Any questions or concerns please let me know.   Thanks!  Karina Hinojosa PA-C

## 2024-05-03 NOTE — PROGRESS NOTES
[facility] - Outpatient Follow-up Note  Jayshree Alcantara 48 y.o. female MRN: [id number]  Encounter: [id number]    ASSESSMENT AND PLAN:    1. Epigastric pain  2. Calculus of gallbladder without cholecystitis without obstruction  Patient with known gallstones presenting with worsening epigastric pain after eating for months with acute worsening of pain in the last few days with associated nausea.    Explained to patient concern for possible cholecystitis.  Will check stat CBC, CMP, right upper quadrant ultrasound    - US right upper quadrant; Future  - CBC; Future  - Comprehensive metabolic panel; Future      3. Diarrhea, unspecified type  Patient complains of acute diarrhea the last few days.  She is up-to-date on colon cancer screening/colonoscopy.  We will check infectious stool studies at this time.    - Stool Enteric Bacterial Panel by PCR; Future  - Clostridium difficile toxin by PCR with EIA; Future  - Giardia antigen; Future  - Ova and parasite examination; Future    4. Gastroparesis  5. Gastroesophageal reflux disease without esophagitis  It is possible that her abdominal pain is secondary to known GERD and gastroparesis.  She is on omeprazole 40 mg once daily which she will continue for now.  Pending results of above, to consider EGD and increasing dose of PPI/adding famotidine at bedtime.  Will await for results and monitor clinical course.      Patient was instructed to call the office with any questions, concerns, new/ worsening/ persisting GI symptoms. Advised patient go to the ER with any severe or worsening abdominal pain, fevers/ chills, intractable N/V, chest pain, SOB, dizziness, lightheadedness, feeling something stuck in esophagus that will not go down. Patient expressed understanding and is in agreement with treatment plan.     Will plan to follow up after diagnostic tests   __________________________________________________________    SUBJECTIVE:      Patient is new  to me.  Patient with past medical history of migraines, GERD, gastroparesis, history of IBS, SIBO, depression, PTSD known gallstones presents to the office today for follow-up.  Patient was last seen in the office 11/17/2023 by Dr. Clark, previous office note was reviewed.    Patient complains of abdominal pain x few months.   The abdominal pain has worsened the last 3 days. The abdominal pain is located in the epigastric region.  The pain can be pretty significant at times over the last 3 days.  The abdominal pain is described as pressure. Can radiate to umbilicus area. The pain is worse with eating/meals. Nothing makes the pain worse.   There is associated nausea, no vomiting, fatigue.  There is associated heartburn, acid reflux as well. These symptoms occur most days after eating.   Patient taking omeprazole 40 mg once daily in the AM. She has tried Levsin prn for abdominal pain without relief.   She also complains of diarrhea x 3 days. She is having ~ 5 loose Bms/ day. Normally she has a BM ~ 2x/ day and stool is more formed.   Her weight is stable and up 5 pounds since last office visit  Patient denies any fevers/ chills, vomiting, black or bloody stools, dysphagia, odynophagia.   Denies chest pain, SOB, dizziness     Review of Systems   Constitutional:  Positive for fatigue. Negative for chills and fever.   HENT:  Negative for ear pain and sore throat.    Eyes:  Negative for pain and visual disturbance.   Respiratory:  Negative for cough and shortness of breath.    Cardiovascular:  Negative for chest pain and palpitations.   Gastrointestinal:  Positive for abdominal pain and nausea. Negative for vomiting.   Genitourinary:  Negative for dysuria and hematuria.   Musculoskeletal:  Negative for arthralgias and back pain.   Skin:  Negative for color change and rash.   Neurological:  Negative for seizures and syncope.   All other systems reviewed and are negative.         Historical Information   Past Medical  History:   Diagnosis Date    GERD (gastroesophageal reflux disease)     Migraine     takes excederine migraine     Past Surgical History:   Procedure Laterality Date    DENTAL SURGERY      ENDOMETRIAL ABLATION N/A 4/1/2022    Procedure: ABLATION ENDOMETRIAL NOVASURE;  Surgeon: Chino Ortiz MD;  [facility];  Service: Gynecology    [place] HYSTEROSCOPY BX ENDOMETRIUM&/POLYPC W/WO D&C N/A 4/1/2022    Procedure: DILATATION AND CURETTAGE (D&C) WITH HYSTEROSCOPY;  Surgeon: Chino Ortiz MD;  [facility];  Service: Gynecology    [place] REMOVAL INTRAUTERINE DEVICE IUD N/A 4/1/2022    Procedure: REMOVAL OF INTRAUTERINE DEVICE (IUD);  Surgeon: Chino Ortiz MD;  [facility];  Service: Gynecology     Social History   Social History     Substance and Sexual Activity   Alcohol Use Not Currently    Comment: social     Social History     Substance and Sexual Activity   Drug Use No     Social History     Tobacco Use   Smoking Status Never   Smokeless Tobacco Never     Family History   Problem Relation Age of Onset    Breast cancer Mother         63 y/o     BRCA1 Negative Mother     Lung cancer Mother 66    Diabetes Father     Hypertension Father     No Known Problems Daughter     Breast cancer Maternal Grandmother 52    Lung cancer Maternal Grandmother 64    Alzheimer's disease Maternal Grandfather     Heart disease Paternal Grandmother     No Known Problems Paternal Grandfather     No Known Problems Brother     No Known Problems Brother     No Known Problems Brother     No Known Problems Son     No Known Problems Son     No Known Problems Maternal Aunt     No Known Problems Maternal Aunt     No Known Problems Maternal Aunt     No Known Problems Maternal Aunt     Lung cancer Maternal Uncle 50    No Known Problems Paternal Aunt     No Known Problems Paternal Aunt     No Known Problems Paternal Aunt     No Known Problems Paternal Aunt     Breast cancer Cousin         age dx unknown    Throat cancer Cousin 41     BRCA1 Positive Cousin     Kidney cancer Cousin 41       Meds/Allergies       Current Outpatient Medications:     Cholecalciferol (Vitamin D) 50 MCG (2000 UT) tablet    EPINEPHrine (EPIPEN) 0.3 mg/0.3 mL SOAJ    ergocalciferol (VITAMIN D2) 50,000 units    escitalopram (LEXAPRO) 10 mg tablet    hyoscyamine (ANASPAZ,LEVSIN) 0.125 MG tablet    ibuprofen (MOTRIN) 600 mg tablet    loratadine (CLARITIN) 10 mg tablet    medroxyPROGESTERone (DEPO-PROVERA) 150 mg/mL injection    omeprazole (PriLOSEC) 40 MG capsule    tiZANidine (ZANAFLEX) 4 mg tablet    Current Facility-Administered Medications:     medroxyPROGESTERone (DEPO-PROVERA) IM injection 150 mg, 150 mg, Intramuscular, Q3 Months, 150 mg at 03/25/24 1542    Allergies   Allergen Reactions    Bee Venom Anaphylaxis    Other Swelling     Patient states that she gets hives and swells when she comes in contact with clorox or bleach products.           Objective     Wt Readings from Last 3 Encounters:   05/03/24 72.1 kg (159 lb)   04/26/24 73.1 kg (161 lb 4 oz)   11/17/23 69.9 kg (154 lb)     Temp Readings from Last 3 Encounters:   05/03/24 99.3 °F (37.4 °C) (Tympanic)   04/26/24 97.7 °F (36.5 °C) (Temporal)   11/17/23 99.6 °F (37.6 °C) (Tympanic)     BP Readings from Last 3 Encounters:   05/03/24 108/70   04/26/24 126/84   11/17/23 116/70     Pulse Readings from Last 3 Encounters:   04/26/24 76   10/16/23 94   09/16/23 86          PHYSICAL EXAM:      Physical Exam  Vitals reviewed.   Constitutional:       General: She is not in acute distress.     Appearance: She is obese. She is not ill-appearing or toxic-appearing.   HENT:      Head: Normocephalic and atraumatic.   Eyes:      Extraocular Movements: Extraocular movements intact.      Conjunctiva/sclera: Conjunctivae normal.   Cardiovascular:      Rate and Rhythm: Normal rate and regular rhythm.   Pulmonary:      Effort: Pulmonary effort is normal. No respiratory distress.      Breath sounds: Normal breath sounds.    Abdominal:      General: Bowel sounds are normal.      Palpations: Abdomen is soft.      Tenderness: There is abdominal tenderness in the epigastric area.   Musculoskeletal:         General: No swelling or tenderness.      Cervical back: Normal range of motion and neck supple.   Skin:     General: Skin is warm and dry.      Coloration: Skin is not jaundiced.   Neurological:      General: No focal deficit present.      Mental Status: She is alert and oriented to person, place, and time. Mental status is at baseline.   Psychiatric:         Mood and Affect: Mood normal.         Behavior: Behavior normal.         Thought Content: Thought content normal.        Lab Results:   Lab Results   Component Value Date    WBC 8.61 03/02/2023    HGB 13.1 03/02/2023    HCT 40.7 03/02/2023    MCV 94 03/02/2023     03/02/2023       Lab Results   Component Value Date     08/23/2015    SODIUM 141 04/26/2024    K 3.9 04/26/2024     04/26/2024    CO2 25 04/26/2024    ANIONGAP 8 08/23/2015    AGAP 10 04/26/2024    BUN 14 04/26/2024    CREATININE 0.68 04/26/2024    GLUC 108 09/01/2021    GLUF 95 04/26/2024    CALCIUM 9.3 04/26/2024    AST 18 04/26/2024    ALT 25 04/26/2024    ALKPHOS 81 04/26/2024    PROT 6.9 08/23/2015    TP 7.4 04/26/2024    BILITOT 0.13 (L) 08/23/2015    TBILI 0.41 04/26/2024    EGFR 104 04/26/2024       Radiology Results:   Patient underwent nuclear medicine gastric emptying scan 6/27/2023, this was reviewed, this showed a delayed rate of gastric emptying consistent with gastroparesis.    Patient underwent ultrasound pelvis complete with transvaginal 3/6/2023, this showed a uterine fibroid, otherwise grossly unremarkable.    Patient underwent breath test which was positive for both SIBO and IMO and she completed a 14-day course of Xifaxan and neomycin in the past    She had a RUQ US 9/1/2021 which showed gallstones     Prior Procedure Results/Reports   Patient underwent both EGD and colonoscopy in  2021 at outside hospital in [place], these per reports were reviewed, both EGD and colonoscopy were completely normal.  A repeat colonoscopy was recommended to be completed in 10 years.    Karina Hinojosa PA-C   Available on [employer]

## 2024-05-04 LAB
C COLI+JEJUNI TUF STL QL NAA+PROBE: NEGATIVE
C DIFF TOX GENS STL QL NAA+PROBE: NEGATIVE
EC STX1+STX2 GENES STL QL NAA+PROBE: NEGATIVE
G LAMBLIA AG STL QL IA: NEGATIVE
SALMONELLA SP SPAO STL QL NAA+PROBE: NEGATIVE
SHIGELLA SP+EIEC IPAH STL QL NAA+PROBE: NEGATIVE

## 2024-05-06 ENCOUNTER — EVALUATION (OUTPATIENT)
Dept: PHYSICAL THERAPY | Facility: REHABILITATION | Age: 49
End: 2024-05-06
Payer: COMMERCIAL

## 2024-05-06 DIAGNOSIS — M54.16 LUMBAR RADICULOPATHY: ICD-10-CM

## 2024-05-06 PROCEDURE — 97110 THERAPEUTIC EXERCISES: CPT | Performed by: PHYSICAL THERAPIST

## 2024-05-06 PROCEDURE — 97161 PT EVAL LOW COMPLEX 20 MIN: CPT | Performed by: PHYSICAL THERAPIST

## 2024-05-06 NOTE — PROGRESS NOTES
PT Evaluation     Today's date: 2024  Patient name: Jayshree Alcantara  : 1975  MRN: [id number]  Referring provider: Antonia Park MD  Dx:   Encounter Diagnosis     ICD-10-CM    1. Lumbar radiculopathy  M54.16 Ambulatory Referral to Physical Therapy          Start Time: 1630  Stop Time: 1715  Total time in clinic (min): 45 minutes    Assessment  Assessment details: Patient is a 48 y.o. female with c/o of chronic lumbar and L buttock pain. Patient's symptoms limit her with prolonged standing, walking, walking uphill, and lifting. On exam patient presents with no directional preference, negative neuro tension testing, and hip weakness. Patient's hx and exam is suggestive of chronic LBP with strength deficits and would benefit from focus on stabilization program. Patient will benefit from skilled PT to address the above impairments to improve her tolerance with functional mobility.   Impairments: abnormal or restricted ROM, impaired physical strength and lacks appropriate home exercise program  Understanding of Dx/Px/POC: good   Prognosis: good    Goals  Within 4 weeks,   1. Patient will demonstrate R hip extension strength >=4/5.   2. Patient will demonstrate L hip extension strength >=4/5.   3. Patient will demonstrate proper TrA contraction.    Within 8 weeks or by discharge,   1. Patient will be able to lift >15 lbs without <3/10.  2. Patient will be able to ambulate for >=15 minutes with <3/10 LBP.  3. Patient will be able to stand for >= 15 minutes with <3/10 LBP.      Plan  Patient would benefit from: skilled physical therapy  Planned modality interventions: cryotherapy and thermotherapy: hydrocollator packs  Planned therapy interventions: abdominal trunk stabilization, joint mobilization, manual therapy, activity modification, balance, balance/weight bearing training, neuromuscular re-education, body mechanics training, postural training, patient education, therapeutic activities, therapeutic  exercise, functional ROM exercises, strengthening, stretching, transfer training, gait training and home exercise program  Frequency: 1-2x/week.  Plan of Care beginning date: 2024  Plan of Care expiration date: 2024  Treatment plan discussed with: patient        Subjective Evaluation    History of Present Illness  Mechanism of injury: Reports had LBP last year and did PT then. The pain decreased the pain, however the pain is worst now.     Aggravating factors: going up hill, sleeping, walking (>30 minutes), pressure on it, lifting laundry basket  Ease factors: medication    Also having gall bladder issues and may be having surgery.     N/T: in her hands, over the past 6-7 months, not in her LE  Patient Goals  Patient goals for therapy: decreased pain  Patient goal: lifting laundry  Pain  Current pain ratin  Location: lumbar to L buttock  Relieving factors: medications    Social Support  Lives in: multiple-level home  Lives with: spouse and young children    Employment status: not working        Objective     Neurological Testing     Sensation     Lumbar   Left   Intact: light touch    Right   Intact: light touch    Reflexes   Left   Patellar (L4): normal (2+)  Achilles (S1): absent (0)    Right   Patellar (L4): normal (2+)  Achilles (S1): absent (0)    Passive Range of Motion   Left Hip   Flexion: WFL  External rotation (90/90): WFL  Internal rotation (90/90): WFL    Right Hip   Flexion: WFL and with pain  External rotation (90/90): WFL  Internal rotation (90/90): WFL  Mechanical Assessment    Cervical      Thoracic      Lumbar    Standing flexion: repeated movements   Pain location:no change  Standing extension: repeated movements  Pain location: no change  Lying extension: repeated movements  Pain location: no change    Strength/Myotome Testing     Lumbar   Left   Heel walk: normal  Toe walk: normal    Right   Heel walk: normal  Toe walk: normal    Left Hip   Planes of Motion   Flexion: 4  Extension:  4-    Right Hip   Planes of Motion   Flexion: 4  Extension: 4-    Left Knee   Extension: 5    Right Knee   Extension: 5    Tests     Lumbar     Left   Negative slump test.     Right   Negative slump test.              POC expires Unit limit Auth Expiration date PT/OT/ST + Visit Limit?   7/1 12/31/24                              Visit/Unit Tracking  AUTH Status:  Date 5/6             Approved - 24 visits Used 1              Remaining  23               Diagnosis: Lumbar pain with referral to L buttock   Precautions: None   Insurance: [employer]    5/6          Vitals     FOTO      Patient Ed           Sleeping position Prone w/ pillow under hips *focus on glute strengthening                                                                           Neuro Re-Ed                                                                                        Ther Ex           Aerobic conditioning           Bridges W/ TrA + DB  3 x 10          Clams No resistance  3 x 10 ea          Prone hip ext 1 pillow under hips  3 x 10 ea                                                      Ther Activity                                 Manual                                                                  Modalities

## 2024-05-09 ENCOUNTER — OFFICE VISIT (OUTPATIENT)
Dept: PHYSICAL THERAPY | Facility: REHABILITATION | Age: 49
End: 2024-05-09
Payer: COMMERCIAL

## 2024-05-09 DIAGNOSIS — M54.16 LUMBAR RADICULOPATHY: Primary | ICD-10-CM

## 2024-05-09 PROCEDURE — 97110 THERAPEUTIC EXERCISES: CPT

## 2024-05-09 NOTE — PROGRESS NOTES
Daily Note     Today's date: 2024  Patient name: Jayshree Alcantara  : 1975  MRN: [id number]  Referring provider: Antonia Park MD  Dx:   Encounter Diagnosis     ICD-10-CM    1. Lumbar radiculopathy  M54.16           Start Time: 930  Stop Time: 1015  Total time in clinic (min): 45 minutes    Subjective: Pt denies any changes.       Objective: See treatment diary below      Assessment: Tolerated treatment well. Patient would benefit from continued PT. Seen for her first visit following evaluation, reviewed and practiced exercises issued last time and added more glute strengthening exercises.  Benefits from cueing to decrease pace.       Plan: Continue per plan of care.      POC expires Unit limit Auth Expiration date PT/OT/ST + Visit Limit?   24                              Visit/Unit Tracking  AUTH Status:  Date             Approved - 24 visits Used 1 2             Remaining  23 22              Diagnosis: Lumbar pain with referral to L buttock   Precautions: None   Insurance: [employer]             Vitals     FOTO      Patient Ed           Sleeping position Prone w/ pillow under hips *focus on glute strengthening                                                                           Neuro Re-Ed                                                                                        Ther Ex           Aerobic conditioning  L5 8'         Bridges W/ TrA + DB  3 x 10 3x10         Clams No resistance  3 x 10 ea 3x10 peach         Prone hip ext 1 pillow under hips  3 x 10 ea 3x10         Prone heel squeeze  3x10         Hip add  2x10         Squats against wall w/ KB  10# 3x10         Standing hip abd/ext  3x10                    Ther Activity                                 Manual                                                                  Modalities

## 2024-05-13 ENCOUNTER — TELEPHONE (OUTPATIENT)
Dept: PSYCHIATRY | Facility: CLINIC | Age: 49
End: 2024-05-13

## 2024-05-13 ENCOUNTER — OFFICE VISIT (OUTPATIENT)
Dept: PHYSICAL THERAPY | Facility: REHABILITATION | Age: 49
End: 2024-05-13
Payer: COMMERCIAL

## 2024-05-13 DIAGNOSIS — M54.16 LUMBAR RADICULOPATHY: Primary | ICD-10-CM

## 2024-05-13 PROCEDURE — 97110 THERAPEUTIC EXERCISES: CPT

## 2024-05-13 NOTE — TELEPHONE ENCOUNTER
Contacted patient in regards to Routine Referral in attempts to verify patient's needs of services and add patient to proper wait list. spoke with patient whom stated [facility] loc pref and no provider pref.

## 2024-05-13 NOTE — PROGRESS NOTES
Daily Note     Today's date: 2024  Patient name: Jayshree Alcantara  : 1975  MRN: [id number]  Referring provider: Antonia Park MD  Dx:   Encounter Diagnosis     ICD-10-CM    1. Lumbar radiculopathy  M54.16             Start Time: 930  Stop Time: 1015  Total time in clinic (min): 45 minutes    Subjective: Feels better, able to do more including planting. Pt owns a TM but has not used it, it's in her basement.      Objective: See treatment diary below      Assessment: Tolerated treatment well. Patient would benefit from continued PT. Encouraged to set up her TM and we will have warmup on the TM next session in lieu of the nustep. Good tolerance to strengthening exercises., more challenged with fatigue while performing s/l hip abduction but remained pain free.     Plan: Continue per plan of care.      POC expires Unit limit Auth Expiration date PT/OT/ST + Visit Limit?   24                              Visit/Unit Tracking  AUTH Status:  Date            Approved - 24 visits Used 1 2 3            Remaining  23 22 21             Diagnosis: Lumbar pain with referral to L buttock   Precautions: None   Insurance: [employer]            Vitals     FOTO      Patient Ed           Sleeping position Prone w/ pillow under hips *focus on glute strengthening                                                                           Neuro Re-Ed                                                                                        Ther Ex           Aerobic conditioning  L5 8' L5 8' TM       Bridges W/ TrA + DB  3 x 10 3x10 3x10        S/l hip abduction   2x10 no pain but fatigues quickly        Clams No resistance  3 x 10 ea 3x10 peach 2x10 YCO        Prone hip ext 1 pillow under hips  3 x 10 ea 3x10 3x10        Prone heel squeeze  3x10 2x10        Hip add  2x10         Squats against wall w/ KB  10# 3x10 10# 2x10 in gym        Standing hip abd/ext  3x10 2x10 w/ resistance         Sit to stand   2x10        Ther Activity                                 Manual                                                                  Modalities

## 2024-05-15 ENCOUNTER — OFFICE VISIT (OUTPATIENT)
Dept: PHYSICAL THERAPY | Facility: REHABILITATION | Age: 49
End: 2024-05-15
Payer: COMMERCIAL

## 2024-05-15 DIAGNOSIS — M54.16 LUMBAR RADICULOPATHY: Primary | ICD-10-CM

## 2024-05-15 PROCEDURE — 97110 THERAPEUTIC EXERCISES: CPT

## 2024-05-15 NOTE — PROGRESS NOTES
Daily Note     Today's date: 5/15/2024  Patient name: Jayshree Alcantara  : 1975  MRN: [id number]  Referring provider: Antonia Park MD  Dx:   Encounter Diagnosis     ICD-10-CM    1. Lumbar radiculopathy  M54.16             Start Time: 0945  Stop Time: 1030  Total time in clinic (min): 45 minutes    Subjective: Continues to feel better every day.     Objective: See treatment diary below      Assessment: Tolerated treatment well. Patient would benefit from continued PT. Able to maintain good speed on TM, worked well through her exercises but does benefit from cueing.     Plan: Continue per plan of care.      POC expires Unit limit Auth Expiration date PT/OT/ST + Visit Limit?   24                              Visit/Unit Tracking  AUTH Status:  Date 5/6 5/9 5/13 5/15          Approved - 24 visits Used 1 2 3 4           Remaining  23 22 21 20            Diagnosis: Lumbar pain with referral to L buttock   Precautions: None   Insurance: [employer]    5/6 5/9 5/13 5/15       Vitals     FOTO      Patient Ed           Sleeping position Prone w/ pillow under hips *focus on glute strengthening                                                                           Neuro Re-Ed                                                                                        Ther Ex           Aerobic conditioning  L5 8' L5 8' TM 2.5-3.5 mph 8'       Bridges W/ TrA + DB  3 x 10 3x10 3x10 2x10 + 1x10 figure 4 bridge       S/l hip abduction   2x10 no pain but fatigues quickly 2x10        Clams No resistance  3 x 10 ea 3x10 peach 2x10 YCO 2x10 YCO       Q-ped dischiavi w/ foam multifidus + rotation    2x10 YCO       Q-ped    2x10 hip extension       Prone hip ext 1 pillow under hips  3 x 10 ea 3x10 3x10        Prone heel squeeze  3x10 2x10        Hip add  2x10         Squats against wall w/ KB  10# 3x10 10# 2x10 in gym 10# 2x10       Standing hip abd/ext  3x10 2x10 w/ resistance 2x10       Sit to stand   2x10 20x 10#  KB       Ther Activity                                 Manual                                                                  Modalities

## 2024-05-16 NOTE — PROGRESS NOTES
Daily Note     Today's date: 2024  Patient name: Jayshree Alcantara  : 1975  MRN: [id number]  Referring provider: Antonia Park MD  Dx:   Encounter Diagnosis     ICD-10-CM    1. Lumbar radiculopathy  M54.16           Start Time: 1105  Stop Time: 1145  Total time in clinic (min): 40 minutes    Subjective: Feeling better, able to do more housework and cleaning and lifting. Her only issue is repetitive tasks. She no longer has to take pain medication. She hosted 40 family members over the weekend and feels ok today. Walking uphill is getting better.       Objective: See treatment diary below      Assessment: Tolerated treatment well. Patient would benefit from continued PT. Patient progressing with her strength program and it is showing in her improvement in functional mobility tolerance. Progressed to higher weight with squats and to step ups with weight. No pain noted during PT session.       Plan: Continue per plan of care.      POC expires Unit limit Auth Expiration date PT/OT/ST + Visit Limit?   24                              Visit/Unit Tracking  AUTH Status:  Date 5/6 5/9 5/13 5/15 5/20         Approved - 24 visits Used 1 2 3 4 5          Remaining  23 22 21 20 19           Diagnosis: Lumbar pain with referral to L buttock   Precautions: None   Insurance: [employer]    5/6 5/9 5/13 5/15 5/20      Vitals     FOTO      Patient Ed           Sleeping position Prone w/ pillow under hips                                                                            Neuro Re-Ed                                                                                        Ther Ex           Aerobic conditioning  L5 8' L5 8' TM 2.5-3.5 mph 8' TM 3 mph  8'      Bridges W/ TrA + DB  3 x 10 3x10 3x10 2x10 + 1x10 figure 4 bridge Figure 4  2 x 10      S/l hip abduction   2x10 no pain but fatigues quickly 2x10  2 x 10      Clams No resistance  3 x 10 ea 3x10 peach 2x10 YCO 2x10 YCO 2 x 10 Orange TB      Q-ped  "dischiavi w/ foam multifidus + rotation    2x10 YCO       Q-ped    2x10 hip extension       Prone hip ext 1 pillow under hips  3 x 10 ea 3x10 3x10        Prone heel squeeze  3x10 2x10        Hip add  2x10         Squats against wall w/ KB  10# 3x10 10# 2x10 in gym 10# 2x10       Standing hip abd/ext  3x10 2x10 w/ resistance 2x10       Sit to stand   2x10 20x 10# KB 10# 10x  15# 2 x 10      Step ups     6\" 6# in each hand  3 x 30s FWD                 Ther Activity                                 Manual                                                                  Modalities                                           "

## 2024-05-17 ENCOUNTER — APPOINTMENT (OUTPATIENT)
Dept: PHYSICAL THERAPY | Facility: REHABILITATION | Age: 49
End: 2024-05-17
Payer: COMMERCIAL

## 2024-05-20 ENCOUNTER — OFFICE VISIT (OUTPATIENT)
Dept: PHYSICAL THERAPY | Facility: REHABILITATION | Age: 49
End: 2024-05-20
Payer: COMMERCIAL

## 2024-05-20 DIAGNOSIS — M54.16 LUMBAR RADICULOPATHY: Primary | ICD-10-CM

## 2024-05-20 PROCEDURE — 97110 THERAPEUTIC EXERCISES: CPT | Performed by: PHYSICAL THERAPIST

## 2024-05-22 ENCOUNTER — OFFICE VISIT (OUTPATIENT)
Dept: PHYSICAL THERAPY | Facility: REHABILITATION | Age: 49
End: 2024-05-22
Payer: COMMERCIAL

## 2024-05-22 ENCOUNTER — OFFICE VISIT (OUTPATIENT)
Dept: SURGERY | Facility: CLINIC | Age: 49
End: 2024-05-22
Payer: COMMERCIAL

## 2024-05-22 VITALS
HEIGHT: 59 IN | SYSTOLIC BLOOD PRESSURE: 111 MMHG | TEMPERATURE: 98 F | HEART RATE: 86 BPM | BODY MASS INDEX: 31.85 KG/M2 | DIASTOLIC BLOOD PRESSURE: 75 MMHG | WEIGHT: 158 LBS

## 2024-05-22 DIAGNOSIS — M54.16 LUMBAR RADICULOPATHY: Primary | ICD-10-CM

## 2024-05-22 DIAGNOSIS — K21.9 GASTROESOPHAGEAL REFLUX DISEASE WITHOUT ESOPHAGITIS: ICD-10-CM

## 2024-05-22 DIAGNOSIS — K31.84 GASTROPARESIS: Primary | ICD-10-CM

## 2024-05-22 DIAGNOSIS — R10.13 EPIGASTRIC PAIN: ICD-10-CM

## 2024-05-22 DIAGNOSIS — K80.20 CALCULUS OF GALLBLADDER WITHOUT CHOLECYSTITIS WITHOUT OBSTRUCTION: ICD-10-CM

## 2024-05-22 PROCEDURE — 99244 OFF/OP CNSLTJ NEW/EST MOD 40: CPT | Performed by: SURGERY

## 2024-05-22 PROCEDURE — 97110 THERAPEUTIC EXERCISES: CPT

## 2024-05-22 NOTE — PROGRESS NOTES
Daily Note     Today's date: 2024  Patient name: Jayshree Alcantara  : 1975  MRN: [id number]  Referring provider: Antonia Park MD  Dx:   Encounter Diagnosis     ICD-10-CM    1. Lumbar radiculopathy  M54.16             Start Time: 1115  Stop Time: 1155  Total time in clinic (min): 40 minutes    Subjective: Continues to feel better, is graduating from the first part of her program tomorrow ([facility]).     Objective: See treatment diary below      Assessment: Tolerated treatment well. Patient would benefit from continued PT. Added more squats with resistance to improve her endurance.  Fatigued but did well.  Attempted to introduce jose chair but was challenged and discontinued.        Plan: Continue per plan of care.      POC expires Unit limit Auth Expiration date PT/OT/ST + Visit Limit?   24                              Visit/Unit Tracking  AUTH Status:  Date 5/6 5/9 5/13 5/15 5/20 5/22        Approved - 24 visits Used 1 2 3 4 5 6         Remaining  23 22 21 20 19 18          Diagnosis: Lumbar pain with referral to L buttock   Precautions: None   Insurance: [employer]    5/6 5/9 5/13 5/15 5/20 5/22     Vitals     FOTO      Patient Ed           Sleeping position Prone w/ pillow under hips                                                                            Neuro Re-Ed                                                                                        Ther Ex           Aerobic conditioning  L5 8' L5 8' TM 2.5-3.5 mph 8' TM 3 mph  8' TM 8'     Bridges W/ TrA + DB  3 x 10 3x10 3x10 2x10 + 1x10 figure 4 bridge Figure 4  2 x 10 2x10     S/l hip abduction   2x10 no pain but fatigues quickly 2x10  2 x 10 2x10     Clams No resistance  3 x 10 ea 3x10 peach 2x10 YCO 2x10 YCO 2 x 10 Orange TB 2x10     Jose chair      1x8 caused discomfort     Q-ped dischiavi w/ foam multifidus + rotation    2x10 YCO       Q-ped    2x10 hip extension       Prone hip ext 1 pillow under hips  3 x 10 ea  "3x10 3x10        Prone heel squeeze  3x10 2x10        Hip add  2x10         Squats against wall w/ KB  10# 3x10 10# 2x10 in gym 10# 2x10  10# 2x10     Standing hip abd/ext  3x10 2x10 w/ resistance 2x10       Sit to stand   2x10 20x 10# KB 10# 10x  15# 2 x 10 15# 3x10     Step ups     6\" 6# in each hand  3 x 30s FWD 6\" 6# in each hand  3 x 30s FWD & lat                Ther Activity                                 Manual                                                                  Modalities                                           "

## 2024-05-22 NOTE — ASSESSMENT & PLAN NOTE
I reviewed her most recent ultrasound and also the one previously.  She has evidence of gallstones without cholecystitis at this time.  I long discussion with her regarding her symptoms and the correlation to gallbladder disease and the correlation to gastroparesis and reflux disease.  I think the pain she is having with eating in the epigastric and right upper quadrant should improve with gallbladder surgery.  I explained that the diarrhea she is having is unlikely to be caused by gallbladder disease and sometimes after gallbladder surgery can have slightly more loose bowel movements.  Will plan on a laparoscopic cholecystectomy at [facility].  The risks benefits and alternatives were explained to her and she is agreeable to proceed.

## 2024-05-22 NOTE — ASSESSMENT & PLAN NOTE
I reviewed her notes from gastroenterology as well as her gastric emptying study.  She does have delayed gastric emptying which can contribute to her nausea bloating early satiety and also to her reflux with her symptoms at night.  I addressed this with her and told her to stay on her 2 medications and to continue with strategies for gastroparesis.

## 2024-05-22 NOTE — H&P (VIEW-ONLY)
Ambulatory Visit  Name: Jayshree Alcantara      : 1975      MRN: [id number]  Encounter Provider: Jose Hurd MD  Encounter Date: 2024   Encounter department: [facility] SURGERY [facility]    Assessment & Plan   1. Gastroparesis  Assessment & Plan:  I reviewed her notes from gastroenterology as well as her gastric emptying study.  She does have delayed gastric emptying which can contribute to her nausea bloating early satiety and also to her reflux with her symptoms at night.  I addressed this with her and told her to stay on her 2 medications and to continue with strategies for gastroparesis.  2. Epigastric pain  -     Ambulatory Referral to General Surgery  3. Calculus of gallbladder without cholecystitis without obstruction  Assessment & Plan:  I reviewed her most recent ultrasound and also the one previously.  She has evidence of gallstones without cholecystitis at this time.  I long discussion with her regarding her symptoms and the correlation to gallbladder disease and the correlation to gastroparesis and reflux disease.  I think the pain she is having with eating in the epigastric and right upper quadrant should improve with gallbladder surgery.  I explained that the diarrhea she is having is unlikely to be caused by gallbladder disease and sometimes after gallbladder surgery can have slightly more loose bowel movements.  Will plan on a laparoscopic cholecystectomy at [facility]  The risks benefits and alternatives were explained to her and she is agreeable to proceed.  Orders:  -     Ambulatory Referral to General Surgery  -     Case request operating room: CHOLECYSTECTOMY LAPAROSCOPIC; Standing  -     Case request operating room: CHOLECYSTECTOMY LAPAROSCOPIC  4. Gastroesophageal reflux disease without esophagitis      History of Present Illness     Jayshree Alcantara is a 48 y.o. female who presents for evaluation of gallstones.  She has multiple abdominal  complaints.  She states when she eats she gets full and bloated very quickly often has nausea in case she has vomiting.  Sometimes she will have pain after eating in the epigastrium and radiates to the right side.  She also complains of epigastric pain and reflux.  When she lies at night she does get the reflux and taste in her mouth.  She was recently started on a second reflux medication but has not had significant treatment yet.  She has no history of gallstones and was evaluated in 2021 but did not undergo surgery at that time.    Review of Systems   Constitutional:  Negative for chills and fever.   HENT:  Negative for ear pain and sore throat.    Eyes:  Negative for pain and visual disturbance.   Respiratory:  Negative for cough and shortness of breath.    Cardiovascular:  Negative for chest pain and palpitations.   Gastrointestinal:  Positive for abdominal pain, nausea and vomiting.   Musculoskeletal:  Negative for arthralgias and back pain.   Skin:  Negative for color change and rash.   Neurological:  Negative for seizures and syncope.   All other systems reviewed and are negative.    Medical History Reviewed by provider this encounter:       Past Medical History   Past Medical History:   Diagnosis Date    GERD (gastroesophageal reflux disease)     Migraine     takes excederine migraine     Past Surgical History:   Procedure Laterality Date    DENTAL SURGERY      ENDOMETRIAL ABLATION N/A 4/1/2022    Procedure: ABLATION ENDOMETRIAL NOVASURE;  Surgeon: Chino Ortiz MD;  [facility];  Service: Gynecology    IN HYSTEROSCOPY BX ENDOMETRIUM&/POLYPC W/WO D&C N/A 4/1/2022    Procedure: DILATATION AND CURETTAGE (D&C) WITH HYSTEROSCOPY;  Surgeon: Chino Ortiz MD;  [facility];  Service: Gynecology    IN REMOVAL INTRAUTERINE DEVICE IUD N/A 4/1/2022    Procedure: REMOVAL OF INTRAUTERINE DEVICE (IUD);  Surgeon: Chino Ortiz MD;  [facility];  Service: Gynecology     Family History    Problem Relation Age of Onset    Breast cancer Mother         65 y/o     BRCA1 Negative Mother     Lung cancer Mother 66    Diabetes Father     Hypertension Father     No Known Problems Daughter     Breast cancer Maternal Grandmother 52    Lung cancer Maternal Grandmother 64    Alzheimer's disease Maternal Grandfather     Heart disease Paternal Grandmother     No Known Problems Paternal Grandfather     No Known Problems Brother     No Known Problems Brother     No Known Problems Brother     No Known Problems Son     No Known Problems Son     No Known Problems Maternal Aunt     No Known Problems Maternal Aunt     No Known Problems Maternal Aunt     No Known Problems Maternal Aunt     Lung cancer Maternal Uncle 50    No Known Problems Paternal Aunt     No Known Problems Paternal Aunt     No Known Problems Paternal Aunt     No Known Problems Paternal Aunt     Breast cancer Cousin         age dx unknown    Throat cancer Cousin 41    BRCA1 Positive Cousin     Kidney cancer Cousin 41     Current Outpatient Medications on File Prior to Visit   Medication Sig Dispense Refill    Cholecalciferol (Vitamin D) 50 MCG (2000 UT) tablet Take 2,000 Units by mouth daily      EPINEPHrine (EPIPEN) 0.3 mg/0.3 mL SOAJ Inject 0.3 mL (0.3 mg total) into a muscle once as needed for anaphylaxis for up to 1 dose 0.6 mL 0    ergocalciferol (VITAMIN D2) 50,000 units Take 1 capsule (50,000 Units total) by mouth once a week 12 capsule 0    escitalopram (LEXAPRO) 10 mg tablet Take 1 tablet (10 mg total) by mouth daily 30 tablet 2    famotidine (PEPCID) 40 MG tablet Take 1 tablet (40 mg total) by mouth daily at bedtime 30 tablet 3    hyoscyamine (ANASPAZ,LEVSIN) 0.125 MG tablet TAKE 1 TABLET BY MOUTH EVERY 6 HOURS AS NEEDED FOR CRAMPING. 90 tablet 1    ibuprofen (MOTRIN) 600 mg tablet Take 1 tablet (600 mg total) by mouth every 6 (six) hours as needed for mild pain 15 tablet 0    loratadine (CLARITIN) 10 mg tablet TAKE 1 TABLET BY MOUTH EVERY DAY  AS NEEDED FOR ALLERGY 90 tablet 0    medroxyPROGESTERone (DEPO-PROVERA) 150 mg/mL injection Inject 1 mL (150 mg total) into a muscle every 3 (three) months 1 mL 3    omeprazole (PriLOSEC) 40 MG capsule TAKE 1 CAPSULE BY MOUTH TWICE A  capsule 1    tiZANidine (ZANAFLEX) 4 mg tablet Take 1 tablet (4 mg total) by mouth every 8 (eight) hours as needed for muscle spasms 270 tablet 1     Current Facility-Administered Medications on File Prior to Visit   Medication Dose Route Frequency Provider Last Rate Last Admin    medroxyPROGESTERone (DEPO-PROVERA) IM injection 150 mg  150 mg Intramuscular Q3 Months LULU [place]   150 mg at 03/25/24 1542     Allergies   Allergen Reactions    Bee Venom Anaphylaxis    Other Swelling     Patient states that she gets hives and swells when she comes in contact with clorox or bleach products.      Current Outpatient Medications on File Prior to Visit   Medication Sig Dispense Refill    Cholecalciferol (Vitamin D) 50 MCG (2000 UT) tablet Take 2,000 Units by mouth daily      EPINEPHrine (EPIPEN) 0.3 mg/0.3 mL SOAJ Inject 0.3 mL (0.3 mg total) into a muscle once as needed for anaphylaxis for up to 1 dose 0.6 mL 0    ergocalciferol (VITAMIN D2) 50,000 units Take 1 capsule (50,000 Units total) by mouth once a week 12 capsule 0    escitalopram (LEXAPRO) 10 mg tablet Take 1 tablet (10 mg total) by mouth daily 30 tablet 2    famotidine (PEPCID) 40 MG tablet Take 1 tablet (40 mg total) by mouth daily at bedtime 30 tablet 3    hyoscyamine (ANASPAZ,LEVSIN) 0.125 MG tablet TAKE 1 TABLET BY MOUTH EVERY 6 HOURS AS NEEDED FOR CRAMPING. 90 tablet 1    ibuprofen (MOTRIN) 600 mg tablet Take 1 tablet (600 mg total) by mouth every 6 (six) hours as needed for mild pain 15 tablet 0    loratadine (CLARITIN) 10 mg tablet TAKE 1 TABLET BY MOUTH EVERY DAY AS NEEDED FOR ALLERGY 90 tablet 0    medroxyPROGESTERone (DEPO-PROVERA) 150 mg/mL injection Inject 1 mL (150 mg total) into a muscle every 3  "(three) months 1 mL 3    omeprazole (PriLOSEC) 40 MG capsule TAKE 1 CAPSULE BY MOUTH TWICE A  capsule 1    tiZANidine (ZANAFLEX) 4 mg tablet Take 1 tablet (4 mg total) by mouth every 8 (eight) hours as needed for muscle spasms 270 tablet 1     Current Facility-Administered Medications on File Prior to Visit   Medication Dose Route Frequency Provider Last Rate Last Admin    medroxyPROGESTERone (DEPO-PROVERA) IM injection 150 mg  150 mg Intramuscular Q3 Months LUL[facility] [place]   150 mg at 03/25/24 1542      Social History     Tobacco Use    Smoking status: Never    Smokeless tobacco: Never   Vaping Use    Vaping status: Never Used   Substance and Sexual Activity    Alcohol use: Not Currently     Comment: social    Drug use: No    Sexual activity: Yes     Partners: Male     Birth control/protection: Injection     Objective     /75 (BP Location: Right arm, Patient Position: Sitting, Cuff Size: Standard)   Pulse 86   Temp 98 °F (36.7 °C) (Tympanic)   Ht 4' 11\" (1.499 m)   Wt 71.7 kg (158 lb)   BMI 31.91 kg/m²     Physical Exam  Vitals and nursing note reviewed.   Constitutional:       General: She is not in acute distress.     Appearance: She is well-developed. She is not diaphoretic.   HENT:      Head: Normocephalic and atraumatic.   Eyes:      Pupils: Pupils are equal, round, and reactive to light.   Cardiovascular:      Rate and Rhythm: Normal rate and regular rhythm.   Pulmonary:      Effort: Pulmonary effort is normal. No respiratory distress.   Abdominal:      General: Bowel sounds are normal.      Palpations: Abdomen is soft.      Tenderness: There is no abdominal tenderness.   Musculoskeletal:         General: Normal range of motion.      Cervical back: Normal range of motion and neck supple.   Skin:     General: Skin is warm and dry.   Neurological:      Mental Status: She is alert and oriented to person, place, and time.   Psychiatric:         Behavior: Behavior normal. "       Administrative Statements

## 2024-05-22 NOTE — PROGRESS NOTES
Ambulatory Visit  Name: Jayshree Alcantara      : 1975      MRN: [id number]  Encounter Provider: Jose Hurd MD  Encounter Date: 2024   Encounter department: [facility] SURGERY [facility]    Assessment & Plan   1. Gastroparesis  Assessment & Plan:  I reviewed her notes from gastroenterology as well as her gastric emptying study.  She does have delayed gastric emptying which can contribute to her nausea bloating early satiety and also to her reflux with her symptoms at night.  I addressed this with her and told her to stay on her 2 medications and to continue with strategies for gastroparesis.  2. Epigastric pain  -     Ambulatory Referral to General Surgery  3. Calculus of gallbladder without cholecystitis without obstruction  Assessment & Plan:  I reviewed her most recent ultrasound and also the one previously.  She has evidence of gallstones without cholecystitis at this time.  I long discussion with her regarding her symptoms and the correlation to gallbladder disease and the correlation to gastroparesis and reflux disease.  I think the pain she is having with eating in the epigastric and right upper quadrant should improve with gallbladder surgery.  I explained that the diarrhea she is having is unlikely to be caused by gallbladder disease and sometimes after gallbladder surgery can have slightly more loose bowel movements.  Will plan on a laparoscopic cholecystectomy at [facility].  The risks benefits and alternatives were explained to her and she is agreeable to proceed.  Orders:  -     Ambulatory Referral to General Surgery  -     Case request operating room: CHOLECYSTECTOMY LAPAROSCOPIC; Standing  -     Case request operating room: CHOLECYSTECTOMY LAPAROSCOPIC  4. Gastroesophageal reflux disease without esophagitis      History of Present Illness     Jayshree Alcantara is a 48 y.o. female who presents for evaluation of gallstones.  She has multiple abdominal  complaints.  She states when she eats she gets full and bloated very quickly often has nausea in case she has vomiting.  Sometimes she will have pain after eating in the epigastrium and radiates to the right side.  She also complains of epigastric pain and reflux.  When she lies at night she does get the reflux and taste in her mouth.  She was recently started on a second reflux medication but has not had significant treatment yet.  She has no history of gallstones and was evaluated in 2021 but did not undergo surgery at that time.    Review of Systems   Constitutional:  Negative for chills and fever.   HENT:  Negative for ear pain and sore throat.    Eyes:  Negative for pain and visual disturbance.   Respiratory:  Negative for cough and shortness of breath.    Cardiovascular:  Negative for chest pain and palpitations.   Gastrointestinal:  Positive for abdominal pain, nausea and vomiting.   Musculoskeletal:  Negative for arthralgias and back pain.   Skin:  Negative for color change and rash.   Neurological:  Negative for seizures and syncope.   All other systems reviewed and are negative.    Medical History Reviewed by provider this encounter:       Past Medical History   Past Medical History:   Diagnosis Date    GERD (gastroesophageal reflux disease)     Migraine     takes excederine migraine     Past Surgical History:   Procedure Laterality Date    DENTAL SURGERY      ENDOMETRIAL ABLATION N/A 4/1/2022    Procedure: ABLATION ENDOMETRIAL NOVASURE;  Surgeon: Chino Ortiz MD;  [facility];  Service: Gynecology    SC HYSTEROSCOPY BX ENDOMETRIUM&/POLYPC W/WO D&C N/A 4/1/2022    Procedure: DILATATION AND CURETTAGE (D&C) WITH HYSTEROSCOPY;  Surgeon: Chino Ortiz MD;  [facility]: [facility];  Service: Gynecology    SC REMOVAL INTRAUTERINE DEVICE IUD N/A 4/1/2022    Procedure: REMOVAL OF INTRAUTERINE DEVICE (IUD);  Surgeon: Chino Ortiz MD;  [facility]: [facility];  Service: Gynecology     Family History    Problem Relation Age of Onset    Breast cancer Mother         63 y/o     BRCA1 Negative Mother     Lung cancer Mother 66    Diabetes Father     Hypertension Father     No Known Problems Daughter     Breast cancer Maternal Grandmother 52    Lung cancer Maternal Grandmother 64    Alzheimer's disease Maternal Grandfather     Heart disease Paternal Grandmother     No Known Problems Paternal Grandfather     No Known Problems Brother     No Known Problems Brother     No Known Problems Brother     No Known Problems Son     No Known Problems Son     No Known Problems Maternal Aunt     No Known Problems Maternal Aunt     No Known Problems Maternal Aunt     No Known Problems Maternal Aunt     Lung cancer Maternal Uncle 50    No Known Problems Paternal Aunt     No Known Problems Paternal Aunt     No Known Problems Paternal Aunt     No Known Problems Paternal Aunt     Breast cancer Cousin         age dx unknown    Throat cancer Cousin 41    BRCA1 Positive Cousin     Kidney cancer Cousin 41     Current Outpatient Medications on File Prior to Visit   Medication Sig Dispense Refill    Cholecalciferol (Vitamin D) 50 MCG (2000 UT) tablet Take 2,000 Units by mouth daily      EPINEPHrine (EPIPEN) 0.3 mg/0.3 mL SOAJ Inject 0.3 mL (0.3 mg total) into a muscle once as needed for anaphylaxis for up to 1 dose 0.6 mL 0    ergocalciferol (VITAMIN D2) 50,000 units Take 1 capsule (50,000 Units total) by mouth once a week 12 capsule 0    escitalopram (LEXAPRO) 10 mg tablet Take 1 tablet (10 mg total) by mouth daily 30 tablet 2    famotidine (PEPCID) 40 MG tablet Take 1 tablet (40 mg total) by mouth daily at bedtime 30 tablet 3    hyoscyamine (ANASPAZ,LEVSIN) 0.125 MG tablet TAKE 1 TABLET BY MOUTH EVERY 6 HOURS AS NEEDED FOR CRAMPING. 90 tablet 1    ibuprofen (MOTRIN) 600 mg tablet Take 1 tablet (600 mg total) by mouth every 6 (six) hours as needed for mild pain 15 tablet 0    loratadine (CLARITIN) 10 mg tablet TAKE 1 TABLET BY MOUTH EVERY DAY  AS NEEDED FOR ALLERGY 90 tablet 0    medroxyPROGESTERone (DEPO-PROVERA) 150 mg/mL injection Inject 1 mL (150 mg total) into a muscle every 3 (three) months 1 mL 3    omeprazole (PriLOSEC) 40 MG capsule TAKE 1 CAPSULE BY MOUTH TWICE A  capsule 1    tiZANidine (ZANAFLEX) 4 mg tablet Take 1 tablet (4 mg total) by mouth every 8 (eight) hours as needed for muscle spasms 270 tablet 1     Current Facility-Administered Medications on File Prior to Visit   Medication Dose Route Frequency Provider Last Rate Last Admin    medroxyPROGESTERone (DEPO-PROVERA) IM injection 150 mg  150 mg Intramuscular Q3 Months [facility] [place]   150 mg at 03/25/24 1542     Allergies   Allergen Reactions    Bee Venom Anaphylaxis    Other Swelling     Patient states that she gets hives and swells when she comes in contact with clorox or bleach products.      Current Outpatient Medications on File Prior to Visit   Medication Sig Dispense Refill    Cholecalciferol (Vitamin D) 50 MCG (2000 UT) tablet Take 2,000 Units by mouth daily      EPINEPHrine (EPIPEN) 0.3 mg/0.3 mL SOAJ Inject 0.3 mL (0.3 mg total) into a muscle once as needed for anaphylaxis for up to 1 dose 0.6 mL 0    ergocalciferol (VITAMIN D2) 50,000 units Take 1 capsule (50,000 Units total) by mouth once a week 12 capsule 0    escitalopram (LEXAPRO) 10 mg tablet Take 1 tablet (10 mg total) by mouth daily 30 tablet 2    famotidine (PEPCID) 40 MG tablet Take 1 tablet (40 mg total) by mouth daily at bedtime 30 tablet 3    hyoscyamine (ANASPAZ,LEVSIN) 0.125 MG tablet TAKE 1 TABLET BY MOUTH EVERY 6 HOURS AS NEEDED FOR CRAMPING. 90 tablet 1    ibuprofen (MOTRIN) 600 mg tablet Take 1 tablet (600 mg total) by mouth every 6 (six) hours as needed for mild pain 15 tablet 0    loratadine (CLARITIN) 10 mg tablet TAKE 1 TABLET BY MOUTH EVERY DAY AS NEEDED FOR ALLERGY 90 tablet 0    medroxyPROGESTERone (DEPO-PROVERA) 150 mg/mL injection Inject 1 mL (150 mg total) into a muscle every 3  "(three) months 1 mL 3    omeprazole (PriLOSEC) 40 MG capsule TAKE 1 CAPSULE BY MOUTH TWICE A  capsule 1    tiZANidine (ZANAFLEX) 4 mg tablet Take 1 tablet (4 mg total) by mouth every 8 (eight) hours as needed for muscle spasms 270 tablet 1     Current Facility-Administered Medications on File Prior to Visit   Medication Dose Route Frequency Provider Last Rate Last Admin    medroxyPROGESTERone (DEPO-PROVERA) IM injection 150 mg  150 mg Intramuscular Q3 Months [place] [place]   150 mg at 03/25/24 1542      Social History     Tobacco Use    Smoking status: Never    Smokeless tobacco: Never   Vaping Use    Vaping status: Never Used   Substance and Sexual Activity    Alcohol use: Not Currently     Comment: social    Drug use: No    Sexual activity: Yes     Partners: Male     Birth control/protection: Injection     Objective     /75 (BP Location: Right arm, Patient Position: Sitting, Cuff Size: Standard)   Pulse 86   Temp 98 °F (36.7 °C) (Tympanic)   Ht 4' 11\" (1.499 m)   Wt 71.7 kg (158 lb)   BMI 31.91 kg/m²     Physical Exam  Vitals and nursing note reviewed.   Constitutional:       General: She is not in acute distress.     Appearance: She is well-developed. She is not diaphoretic.   HENT:      Head: Normocephalic and atraumatic.   Eyes:      Pupils: Pupils are equal, round, and reactive to light.   Cardiovascular:      Rate and Rhythm: Normal rate and regular rhythm.   Pulmonary:      Effort: Pulmonary effort is normal. No respiratory distress.   Abdominal:      General: Bowel sounds are normal.      Palpations: Abdomen is soft.      Tenderness: There is no abdominal tenderness.   Musculoskeletal:         General: Normal range of motion.      Cervical back: Normal range of motion and neck supple.   Skin:     General: Skin is warm and dry.   Neurological:      Mental Status: She is alert and oriented to person, place, and time.   Psychiatric:         Behavior: Behavior normal. "       Administrative Statements

## 2024-05-24 ENCOUNTER — OFFICE VISIT (OUTPATIENT)
Dept: INTERNAL MEDICINE CLINIC | Facility: CLINIC | Age: 49
End: 2024-05-24

## 2024-05-24 VITALS
BODY MASS INDEX: 32.07 KG/M2 | TEMPERATURE: 98.5 F | HEART RATE: 80 BPM | WEIGHT: 158.8 LBS | DIASTOLIC BLOOD PRESSURE: 81 MMHG | OXYGEN SATURATION: 98 % | SYSTOLIC BLOOD PRESSURE: 122 MMHG

## 2024-05-24 DIAGNOSIS — F32.1 MODERATE MAJOR DEPRESSION, SINGLE EPISODE (HCC): ICD-10-CM

## 2024-05-24 DIAGNOSIS — M54.2 CERVICALGIA: ICD-10-CM

## 2024-05-24 DIAGNOSIS — K80.20 CALCULUS OF GALLBLADDER WITHOUT CHOLECYSTITIS WITHOUT OBSTRUCTION: ICD-10-CM

## 2024-05-24 DIAGNOSIS — M54.16 LUMBAR RADICULOPATHY: Primary | ICD-10-CM

## 2024-05-24 DIAGNOSIS — R05.1 ACUTE COUGH: ICD-10-CM

## 2024-05-24 DIAGNOSIS — K31.84 GASTROPARESIS: ICD-10-CM

## 2024-05-24 DIAGNOSIS — R20.2 PARESTHESIA OF HAND, BILATERAL: ICD-10-CM

## 2024-05-24 PROCEDURE — 99214 OFFICE O/P EST MOD 30 MIN: CPT | Performed by: FAMILY MEDICINE

## 2024-05-24 RX ORDER — SODIUM CHLORIDE, SODIUM LACTATE, POTASSIUM CHLORIDE, CALCIUM CHLORIDE 600; 310; 30; 20 MG/100ML; MG/100ML; MG/100ML; MG/100ML
125 INJECTION, SOLUTION INTRAVENOUS CONTINUOUS
OUTPATIENT
Start: 2024-06-17

## 2024-05-24 RX ORDER — BENZONATATE 100 MG/1
200 CAPSULE ORAL 3 TIMES DAILY PRN
Qty: 30 CAPSULE | Refills: 0 | Status: SHIPPED | OUTPATIENT
Start: 2024-05-24

## 2024-05-24 NOTE — ASSESSMENT & PLAN NOTE
See HPI , pt has lengthy hx of upper abd pain , gastroparesis , gallstones noted ~ 2 yrs ago She has been following with GI recently seen 5/3/24 , she has been having worsening upper abd pain ,  N after eating  RUQ US ordered , + gallstones no GB wall thickening duct dilation or obstruction , she saw gen surg who recommended she have lap choley , this is scheduled on 6/17/24

## 2024-05-24 NOTE — ASSESSMENT & PLAN NOTE
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 She enjoys helping to care for her Grand children during the Summer months continue same med dose f/u here  4-5 months sooner if needed

## 2024-05-24 NOTE — ASSESSMENT & PLAN NOTE
Pt has had for years follows w GI recent labs cbc , cmp stable she avoids offending foods and eats small frequent meals , stable at this time

## 2024-05-24 NOTE — ASSESSMENT & PLAN NOTE
Pt has been attending P.T had been to 5 sessions she is compliant with home exercises stretches , she is feeling better with time

## 2024-05-25 DIAGNOSIS — K21.9 GASTROESOPHAGEAL REFLUX DISEASE WITHOUT ESOPHAGITIS: ICD-10-CM

## 2024-05-28 ENCOUNTER — APPOINTMENT (OUTPATIENT)
Dept: PHYSICAL THERAPY | Facility: REHABILITATION | Age: 49
End: 2024-05-28
Payer: COMMERCIAL

## 2024-05-28 NOTE — PROGRESS NOTES
Daily Note     Today's date: 2024  Patient name: Jayshree Alcantara  : 1975  MRN: [id number]  Referring provider: Antonia Pakr MD  Dx:   No diagnosis found.                   Subjective: Continues to feel better, is graduating from the first part of her program tomorrow ([place]).     Objective: See treatment diary below      Assessment: Tolerated treatment well. Patient would benefit from continued PT. Added more squats with resistance to improve her endurance.  Fatigued but did well.  Attempted to introduce jose chair but was challenged and discontinued.        Plan: Continue per plan of care.      POC expires Unit limit Auth Expiration date PT/OT/ST + Visit Limit?   24                              Visit/Unit Tracking  AUTH Status:  Date 5/6 5/9 5/13 5/15 5/20 5/22        Approved - 24 visits Used 1 2 3 4 5 6         Remaining  23 22 21 20 19 18          Diagnosis: Lumbar pain with referral to L buttock   Precautions: None   Insurance: [employer]    5/6 5/9 5/13 5/15 5/20 5/22     Vitals     FOTO      Patient Ed           Sleeping position Prone w/ pillow under hips                                                                            Neuro Re-Ed                                                                                        Ther Ex           Aerobic conditioning  L5 8' L5 8' TM 2.5-3.5 mph 8' TM 3 mph  8' TM 8'     Bridges W/ TrA + DB  3 x 10 3x10 3x10 2x10 + 1x10 figure 4 bridge Figure 4  2 x 10 2x10     S/l hip abduction   2x10 no pain but fatigues quickly 2x10  2 x 10 2x10     Clams No resistance  3 x 10 ea 3x10 peach 2x10 YCO 2x10 YCO 2 x 10 Orange TB 2x10     Jose chair      1x8 caused discomfort     Q-ped dischiavi w/ foam multifidus + rotation    2x10 YCO       Q-ped    2x10 hip extension       Prone hip ext 1 pillow under hips  3 x 10 ea 3x10 3x10        Prone heel squeeze  3x10 2x10        Hip add  2x10         Squats against wall w/ KB  10# 3x10 10# 2x10  "in gym 10# 2x10  10# 2x10     Standing hip abd/ext  3x10 2x10 w/ resistance 2x10       Sit to stand   2x10 20x 10# KB 10# 10x  15# 2 x 10 15# 3x10     Step ups     6\" 6# in each hand  3 x 30s FWD 6\" 6# in each hand  3 x 30s FWD & lat                Ther Activity                                 Manual                                                                  Modalities                                           "

## 2024-05-29 RX ORDER — FAMOTIDINE 40 MG/1
40 TABLET, FILM COATED ORAL
Qty: 90 TABLET | Refills: 1 | Status: SHIPPED | OUTPATIENT
Start: 2024-05-29

## 2024-05-30 ENCOUNTER — EVALUATION (OUTPATIENT)
Dept: PHYSICAL THERAPY | Facility: REHABILITATION | Age: 49
End: 2024-05-30
Payer: COMMERCIAL

## 2024-05-30 DIAGNOSIS — M54.16 LUMBAR RADICULOPATHY: Primary | ICD-10-CM

## 2024-05-30 PROCEDURE — 97164 PT RE-EVAL EST PLAN CARE: CPT | Performed by: PHYSICAL THERAPIST

## 2024-05-30 PROCEDURE — 97110 THERAPEUTIC EXERCISES: CPT | Performed by: PHYSICAL THERAPIST

## 2024-05-30 NOTE — PROGRESS NOTES
PT Re-Evaluation     Today's date: 2024  Patient name: Jayshree Alcantara  : 1975  MRN: [id number]  Referring provider: Antonia Park MD  Dx:   Encounter Diagnosis     ICD-10-CM    1. Lumbar radiculopathy  M54.16             Start Time: 1100  Stop Time: 1120  Total time in clinic (min): 20 minutes    Assessment    Assessment details: Patient is 48 y.o. female who has attended PT over the past 3 weeks for her complaints of chronic lumbar and L buttock pain. Subjectively, patient reports significant improvement with tolerance with walking, walking uphill, lifting, and cleaning. Objectively, patient presents improvement in her lumbar mobility and hip strength. Patient has met all of her PT goals. At this time, patient is independent with her HEP and is ready for discharge.    Goals  Within 4 weeks,   1. Patient will demonstrate R hip extension strength >=4/5. - Met  2. Patient will demonstrate L hip extension strength >=4/5. - Met  3. Patient will demonstrate proper TrA contraction. - Met    Within 8 weeks or by discharge,   1. Patient will be able to lift >15 lbs without <3/10. - Met  2. Patient will be able to ambulate for >=15 minutes with <3/10 LBP. - Met  3. Patient will be able to stand for >= 15 minutes with <3/10 LBP. - Met      Plan    Treatment plan discussed with: patient      Subjective Evaluation    History of Present Illness  Mechanism of injury: Interval History (24): Feels much better. Able to walk up hill better around her home. Able to lift her laundry better and her grand daughter. Able to lift a pack of water bottle.   Having some gall bladder pain on her R side - will have surgery soon.    Initial Evaluation:  Reports had LBP last year and did PT then. The pain decreased the pain, however the pain is worst now.     Aggravating factors: going up hill, sleeping, walking (>30 minutes), pressure on it, lifting laundry basket  Ease factors: medication    Also having gall bladder  issues and may be having surgery.     N/T: in her hands, over the past 6-7 months, not in her LE  Patient Goals  Patient goals for therapy: decreased pain  Patient goal: lifting laundry  Pain  Current pain ratin  Location: lumbar to L buttock  Relieving factors: medications    Social Support  Lives in: multiple-level home  Lives with: spouse and young children    Employment status: not working      Objective     Neurological Testing     Sensation     Lumbar   Left   Intact: light touch    Right   Intact: light touch    Reflexes   Left   Patellar (L4): normal (2+)  Achilles (S1): absent (0)    Right   Patellar (L4): normal (2+)  Achilles (S1): absent (0)    Active Range of Motion     Lumbar   Flexion:  WFL  Extension:  WFL  Left lateral flexion:  WFL  Right lateral flexion:  WFL    Passive Range of Motion   Left Hip   Flexion: WFL  External rotation (90/90): WFL  Internal rotation (90/90): WFL    Right Hip   Flexion: WFL and with pain  External rotation (90/90): WFL  Internal rotation (90/90): WFL    Strength/Myotome Testing     Left Hip   Planes of Motion   Extension: 4+    Right Hip   Planes of Motion   Extension: 4+             POC expires Unit limit Auth Expiration date PT/OT/ST + Visit Limit?   24                              Visit/Unit Tracking  AUTH Status:  Date 5/6 5/9 5/13 5/15 5/20 5/22 5/30       Approved - 24 visits Used 1 2 3 4 5 6 7        Remaining  23 22 21 20 19 18 17         Diagnosis: Lumbar pain with referral to L buttock   Precautions: None   Insurance: "[employer]    5/6 5/9 5/13 5/15 5/20 5/22 5/30    Vitals     FOTO      Patient Ed           Sleeping position Prone w/ pillow under hips                                                                            Neuro Re-Ed                                                                                        Ther Ex           Aerobic conditioning  L5 8' L5 8' TM 2.5-3.5 mph 8' TM 3 mph  8' TM 8' TM 8'  3 mph    Bridges W/ TrA +  "DB  3 x 10 3x10 3x10 2x10 + 1x10 figure 4 bridge Figure 4  2 x 10 2x10 Reviewed - Figure 4, progress to unilateral when ready    S/l hip abduction   2x10 no pain but fatigues quickly 2x10  2 x 10 2x10 Progress up to 3 x 15    Clams No resistance  3 x 10 ea 3x10 peach 2x10 YCO 2x10 YCO 2 x 10 Orange TB 2x10 GTB 10x each    Jose chair      1x8 caused discomfort     Q-ped dischiavi w/ foam multifidus + rotation    2x10 YCO       Q-ped    2x10 hip extension       Prone hip ext 1 pillow under hips  3 x 10 ea 3x10 3x10        Prone heel squeeze  3x10 2x10        Hip add  2x10         Squats against wall w/ KB  10# 3x10 10# 2x10 in gym 10# 2x10  10# 2x10 Reviewed    Standing hip abd/ext  3x10 2x10 w/ resistance 2x10       Sit to stand   2x10 20x 10# KB 10# 10x  15# 2 x 10 15# 3x10     Step ups     6\" 6# in each hand  3 x 30s FWD 6\" 6# in each hand  3 x 30s FWD & lat Reviewed               Ther Activity                                 Manual                                                                  Modalities                                             "

## 2024-06-06 NOTE — PRE-PROCEDURE INSTRUCTIONS
Pre-Surgery Instructions:   Medication Instructions    EPINEPHrine (EPIPEN) 0.3 mg/0.3 mL SOAJ Use in emergency    ergocalciferol (VITAMIN D2) 50,000 units Stop taking 7 days prior to surgery.    escitalopram (LEXAPRO) 10 mg tablet Uses PRN- OK to take day of surgery    famotidine (PEPCID) 40 MG tablet Take day of surgery.    hyoscyamine (ANASPAZ,LEVSIN) 0.125 MG tablet Uses PRN- OK to take day of surgery    ibuprofen (MOTRIN) 600 mg tablet Stop taking 7 days prior to surgery.    medroxyPROGESTERone (DEPO-PROVERA) 150 mg/mL injection Next injection due 6/10/24    omeprazole (PriLOSEC) 40 MG capsule Take night before surgery    tiZANidine (ZANAFLEX) 4 mg tablet Uses PRN- OK to take day of surgery   Medication instructions for day surgery reviewed. Please use only a sip of water to take your instructed medications. Avoid all over the counter vitamins, supplements and NSAIDS for one week prior to surgery per anesthesia guidelines. Tylenol is ok to take as needed.     You will receive a call one business day prior to surgery with an arrival time and hospital directions. If your surgery is scheduled on a Monday, the hospital will be calling you on the Friday prior to your surgery. If you have not heard from anyone by 8pm, please call the hospital supervisor through the hospital  at [phone]. ([place] [phone] or [place] [phone]).    Do not eat or drink anything after midnight the night before your surgery, including candy, mints, lifesavers, or chewing gum. Do not drink alcohol 24hrs before your surgery. Try not to smoke at least 24hrs before your surgery.       Follow the pre surgery showering instructions as listed in the “My Surgical Experience Booklet” or otherwise provided by your surgeon's office. Do not use a blade to shave the surgical area 1 week before surgery. It is okay to use a clean electric clippers up to 24 hours before surgery. Do not apply any lotions, creams, including makeup,  cologne, deodorant, or perfumes after showering on the day of your surgery. Do not use dry shampoo, hair spray, hair gel, or any type of hair products.     No contact lenses, eye make-up, or artificial eyelashes. Remove nail polish, including gel polish, and any artificial, gel, or acrylic nails if possible. Remove all jewelry including rings and body piercing jewelry.     Wear causal clothing that is easy to take on and off. Consider your type of surgery.    Keep any valuables, jewelry, piercings at home. Please bring any specially ordered equipment (sling, braces) if indicated.    Arrange for a responsible person to drive you to and from the hospital on the day of your surgery. Please confirm the visitor policy for the day of your procedure when you receive your phone call with an arrival time.     Call the surgeon's office with any new illnesses, exposures, or additional questions prior to surgery.    Please reference your “My Surgical Experience Booklet” for additional information to prepare for your upcoming surgery.

## 2024-06-10 ENCOUNTER — CLINICAL SUPPORT (OUTPATIENT)
Dept: OBGYN CLINIC | Facility: CLINIC | Age: 49
End: 2024-06-10

## 2024-06-10 DIAGNOSIS — Z30.42 ENCOUNTER FOR MANAGEMENT AND INJECTION OF DEPO-PROVERA: Primary | ICD-10-CM

## 2024-06-10 DIAGNOSIS — Z30.013 ENCOUNTER FOR INITIAL PRESCRIPTION OF INJECTABLE CONTRACEPTIVE: ICD-10-CM

## 2024-06-10 PROCEDURE — 96372 THER/PROPH/DIAG INJ SC/IM: CPT

## 2024-06-10 RX ORDER — MEDROXYPROGESTERONE ACETATE 150 MG/ML
150 INJECTION, SUSPENSION INTRAMUSCULAR ONCE
Status: COMPLETED | OUTPATIENT
Start: 2024-06-10 | End: 2024-06-10

## 2024-06-10 RX ORDER — MEDROXYPROGESTERONE ACETATE 150 MG/ML
150 INJECTION, SUSPENSION INTRAMUSCULAR
Qty: 1 ML | Refills: 0 | Status: SHIPPED | OUTPATIENT
Start: 2024-06-10

## 2024-06-10 RX ADMIN — MEDROXYPROGESTERONE ACETATE 150 MG: 150 INJECTION, SUSPENSION INTRAMUSCULAR at 15:57

## 2024-06-14 ENCOUNTER — ANESTHESIA EVENT (OUTPATIENT)
Dept: PERIOP | Facility: HOSPITAL | Age: 49
End: 2024-06-14
Payer: COMMERCIAL

## 2024-06-17 ENCOUNTER — HOSPITAL ENCOUNTER (OUTPATIENT)
Facility: HOSPITAL | Age: 49
Setting detail: OUTPATIENT SURGERY
Discharge: HOME | End: 2024-06-17
Attending: SURGERY | Admitting: SURGERY
Payer: COMMERCIAL

## 2024-06-17 ENCOUNTER — ANESTHESIA (OUTPATIENT)
Dept: PERIOP | Facility: HOSPITAL | Age: 49
End: 2024-06-17
Payer: COMMERCIAL

## 2024-06-17 VITALS
DIASTOLIC BLOOD PRESSURE: 72 MMHG | SYSTOLIC BLOOD PRESSURE: 109 MMHG | WEIGHT: 155.2 LBS | BODY MASS INDEX: 31.35 KG/M2 | TEMPERATURE: 98.3 F | OXYGEN SATURATION: 96 % | RESPIRATION RATE: 18 BRPM | HEART RATE: 85 BPM

## 2024-06-17 DIAGNOSIS — K80.20 CALCULUS OF GALLBLADDER WITHOUT CHOLECYSTITIS WITHOUT OBSTRUCTION: ICD-10-CM

## 2024-06-17 LAB
EXT PREGNANCY TEST URINE: NEGATIVE
EXT. CONTROL: NORMAL

## 2024-06-17 PROCEDURE — 47562 LAPAROSCOPIC CHOLECYSTECTOMY: CPT | Performed by: SURGERY

## 2024-06-17 PROCEDURE — 88304 TISSUE EXAM BY PATHOLOGIST: CPT | Performed by: PATHOLOGY

## 2024-06-17 PROCEDURE — 81025 URINE PREGNANCY TEST: CPT | Performed by: ANESTHESIOLOGY

## 2024-06-17 RX ORDER — OXYCODONE HYDROCHLORIDE 5 MG/1
5 TABLET ORAL EVERY 4 HOURS PRN
Qty: 16 TABLET | Refills: 0 | Status: SHIPPED | OUTPATIENT
Start: 2024-06-17 | End: 2024-06-27

## 2024-06-17 RX ORDER — OXYCODONE HYDROCHLORIDE 5 MG/1
5 TABLET ORAL EVERY 4 HOURS PRN
Status: DISCONTINUED | OUTPATIENT
Start: 2024-06-17 | End: 2024-06-17 | Stop reason: HOSPADM

## 2024-06-17 RX ORDER — SODIUM CHLORIDE, SODIUM LACTATE, POTASSIUM CHLORIDE, CALCIUM CHLORIDE 600; 310; 30; 20 MG/100ML; MG/100ML; MG/100ML; MG/100ML
125 INJECTION, SOLUTION INTRAVENOUS CONTINUOUS
Status: DISCONTINUED | OUTPATIENT
Start: 2024-06-17 | End: 2024-06-17 | Stop reason: HOSPADM

## 2024-06-17 RX ORDER — ONDANSETRON 2 MG/ML
INJECTION INTRAMUSCULAR; INTRAVENOUS AS NEEDED
Status: DISCONTINUED | OUTPATIENT
Start: 2024-06-17 | End: 2024-06-17

## 2024-06-17 RX ORDER — MIDAZOLAM HYDROCHLORIDE 2 MG/2ML
INJECTION, SOLUTION INTRAMUSCULAR; INTRAVENOUS AS NEEDED
Status: DISCONTINUED | OUTPATIENT
Start: 2024-06-17 | End: 2024-06-17

## 2024-06-17 RX ORDER — HYDROMORPHONE HCL/PF 1 MG/ML
0.5 SYRINGE (ML) INJECTION
Status: DISCONTINUED | OUTPATIENT
Start: 2024-06-17 | End: 2024-06-17 | Stop reason: HOSPADM

## 2024-06-17 RX ORDER — LIDOCAINE HYDROCHLORIDE 10 MG/ML
INJECTION, SOLUTION EPIDURAL; INFILTRATION; INTRACAUDAL; PERINEURAL AS NEEDED
Status: DISCONTINUED | OUTPATIENT
Start: 2024-06-17 | End: 2024-06-17

## 2024-06-17 RX ORDER — LABETALOL HYDROCHLORIDE 5 MG/ML
INJECTION, SOLUTION INTRAVENOUS AS NEEDED
Status: DISCONTINUED | OUTPATIENT
Start: 2024-06-17 | End: 2024-06-17

## 2024-06-17 RX ORDER — DEXAMETHASONE SODIUM PHOSPHATE 10 MG/ML
INJECTION, SOLUTION INTRAMUSCULAR; INTRAVENOUS AS NEEDED
Status: DISCONTINUED | OUTPATIENT
Start: 2024-06-17 | End: 2024-06-17

## 2024-06-17 RX ORDER — CEFAZOLIN SODIUM 1 G/50ML
1000 SOLUTION INTRAVENOUS ONCE
Status: COMPLETED | OUTPATIENT
Start: 2024-06-17 | End: 2024-06-17

## 2024-06-17 RX ORDER — FENTANYL CITRATE/PF 50 MCG/ML
25 SYRINGE (ML) INJECTION
Status: COMPLETED | OUTPATIENT
Start: 2024-06-17 | End: 2024-06-17

## 2024-06-17 RX ORDER — FENTANYL CITRATE 50 UG/ML
INJECTION, SOLUTION INTRAMUSCULAR; INTRAVENOUS AS NEEDED
Status: DISCONTINUED | OUTPATIENT
Start: 2024-06-17 | End: 2024-06-17

## 2024-06-17 RX ORDER — ROCURONIUM BROMIDE 10 MG/ML
INJECTION, SOLUTION INTRAVENOUS AS NEEDED
Status: DISCONTINUED | OUTPATIENT
Start: 2024-06-17 | End: 2024-06-17

## 2024-06-17 RX ORDER — PROMETHAZINE HYDROCHLORIDE 25 MG/ML
6.25 INJECTION, SOLUTION INTRAMUSCULAR; INTRAVENOUS ONCE AS NEEDED
Status: DISCONTINUED | OUTPATIENT
Start: 2024-06-17 | End: 2024-06-17 | Stop reason: HOSPADM

## 2024-06-17 RX ORDER — PROPOFOL 10 MG/ML
INJECTION, EMULSION INTRAVENOUS AS NEEDED
Status: DISCONTINUED | OUTPATIENT
Start: 2024-06-17 | End: 2024-06-17

## 2024-06-17 RX ORDER — BUPIVACAINE HYDROCHLORIDE AND EPINEPHRINE 2.5; 5 MG/ML; UG/ML
INJECTION, SOLUTION EPIDURAL; INFILTRATION; INTRACAUDAL; PERINEURAL AS NEEDED
Status: DISCONTINUED | OUTPATIENT
Start: 2024-06-17 | End: 2024-06-17 | Stop reason: HOSPADM

## 2024-06-17 RX ORDER — MAGNESIUM HYDROXIDE 1200 MG/15ML
LIQUID ORAL AS NEEDED
Status: DISCONTINUED | OUTPATIENT
Start: 2024-06-17 | End: 2024-06-17 | Stop reason: HOSPADM

## 2024-06-17 RX ORDER — ONDANSETRON 2 MG/ML
4 INJECTION INTRAMUSCULAR; INTRAVENOUS ONCE AS NEEDED
Status: DISCONTINUED | OUTPATIENT
Start: 2024-06-17 | End: 2024-06-17 | Stop reason: HOSPADM

## 2024-06-17 RX ORDER — HYDROMORPHONE HCL/PF 1 MG/ML
SYRINGE (ML) INJECTION AS NEEDED
Status: DISCONTINUED | OUTPATIENT
Start: 2024-06-17 | End: 2024-06-17

## 2024-06-17 RX ORDER — SODIUM CHLORIDE 9 MG/ML
125 INJECTION, SOLUTION INTRAVENOUS CONTINUOUS
Status: DISCONTINUED | OUTPATIENT
Start: 2024-06-17 | End: 2024-06-17 | Stop reason: HOSPADM

## 2024-06-17 RX ADMIN — SUGAMMADEX 50 MG: 100 INJECTION, SOLUTION INTRAVENOUS at 11:20

## 2024-06-17 RX ADMIN — SODIUM CHLORIDE 125 ML/HR: 0.9 INJECTION, SOLUTION INTRAVENOUS at 08:21

## 2024-06-17 RX ADMIN — ROCURONIUM BROMIDE 15 MG: 10 INJECTION, SOLUTION INTRAVENOUS at 10:44

## 2024-06-17 RX ADMIN — CEFAZOLIN SODIUM 1000 MG: 1 SOLUTION INTRAVENOUS at 09:54

## 2024-06-17 RX ADMIN — SUGAMMADEX 50 MG: 100 INJECTION, SOLUTION INTRAVENOUS at 11:17

## 2024-06-17 RX ADMIN — ONDANSETRON 4 MG: 2 INJECTION INTRAMUSCULAR; INTRAVENOUS at 10:58

## 2024-06-17 RX ADMIN — OXYCODONE 5 MG: 5 TABLET ORAL at 13:22

## 2024-06-17 RX ADMIN — FENTANYL CITRATE 100 MCG: 50 INJECTION INTRAMUSCULAR; INTRAVENOUS at 09:45

## 2024-06-17 RX ADMIN — LIDOCAINE HYDROCHLORIDE 100 MG: 10 INJECTION, SOLUTION EPIDURAL; INFILTRATION; INTRACAUDAL; PERINEURAL at 09:45

## 2024-06-17 RX ADMIN — SUGAMMADEX 50 MG: 100 INJECTION, SOLUTION INTRAVENOUS at 11:16

## 2024-06-17 RX ADMIN — FENTANYL CITRATE 25 MCG: 50 INJECTION INTRAMUSCULAR; INTRAVENOUS at 12:01

## 2024-06-17 RX ADMIN — FENTANYL CITRATE 50 MCG: 50 INJECTION INTRAMUSCULAR; INTRAVENOUS at 10:11

## 2024-06-17 RX ADMIN — DEXAMETHASONE SODIUM PHOSPHATE 10 MG: 10 INJECTION INTRAMUSCULAR; INTRAVENOUS at 09:48

## 2024-06-17 RX ADMIN — FENTANYL CITRATE 25 MCG: 50 INJECTION INTRAMUSCULAR; INTRAVENOUS at 11:38

## 2024-06-17 RX ADMIN — FENTANYL CITRATE 50 MCG: 50 INJECTION INTRAMUSCULAR; INTRAVENOUS at 10:16

## 2024-06-17 RX ADMIN — LABETALOL HYDROCHLORIDE 5 MG: 5 INJECTION, SOLUTION INTRAVENOUS at 10:57

## 2024-06-17 RX ADMIN — FENTANYL CITRATE 25 MCG: 50 INJECTION INTRAMUSCULAR; INTRAVENOUS at 12:10

## 2024-06-17 RX ADMIN — SUGAMMADEX 50 MG: 100 INJECTION, SOLUTION INTRAVENOUS at 11:18

## 2024-06-17 RX ADMIN — HYDROMORPHONE HYDROCHLORIDE 0.5 MG: 1 INJECTION, SOLUTION INTRAMUSCULAR; INTRAVENOUS; SUBCUTANEOUS at 10:45

## 2024-06-17 RX ADMIN — SODIUM CHLORIDE, SODIUM LACTATE, POTASSIUM CHLORIDE, AND CALCIUM CHLORIDE: .6; .31; .03; .02 INJECTION, SOLUTION INTRAVENOUS at 11:18

## 2024-06-17 RX ADMIN — ROCURONIUM BROMIDE 50 MG: 10 INJECTION, SOLUTION INTRAVENOUS at 09:47

## 2024-06-17 RX ADMIN — PROPOFOL 150 MG: 10 INJECTION, EMULSION INTRAVENOUS at 09:46

## 2024-06-17 RX ADMIN — MIDAZOLAM 2 MG: 1 INJECTION INTRAMUSCULAR; INTRAVENOUS at 09:40

## 2024-06-17 RX ADMIN — FENTANYL CITRATE 25 MCG: 50 INJECTION INTRAMUSCULAR; INTRAVENOUS at 11:49

## 2024-06-17 NOTE — DISCHARGE INSTR - AVS FIRST PAGE
[facility]’s General Surgery [facility]     Post-Operative Care Instructions     Dr. Jose Hurd MD, [facility]     [phone]          1. General: You will feel pulling sensations around the wound or funny aches and pains around the incisions. This is normal. Even minor surgery is a change in your body and this is your body’s way of reacting to it. If you have had abdominal surgery, it may help to support the incision with a small pillow or blanket for comfort when moving or coughing.     2. Wound care:  Okay to shower.  The glue will fall off over the next week or 2.   Use ice for at least the 1st 48 hours.  Do not use for longer than 20 minutes at a time. Use 3 times per day.     3. Water: You may shower over the wounds. Do not bathe or use a pool or hot tub until cleared by the physician.   If you were discharged with a drain, make sure drain site is covered with plastic wrap before showering.      4. Activity: You may go up and down stairs, walk as much as you are comfortable, but walk at least 3 times each day. If you have had abdominal surgery, do not lift anything heavier than 20 pounds for at least 4 weeks.      5. Diet: You may resume a regular diet. If you had a same-day surgery or overnight stay surgery, you may wish to eat lightly for a few days: soups, crackers, and sandwiches. You may resume a regular diet when ready.      6. Medications: Resume all of your previous medications, unless told otherwise by the doctor. Avoid aspirin products for 2-3 days after the date of surgery. You may, at that time, began to take them again. Use Tylenol and Ibuprofen for pain control.  You may alternate these medications every 3 hours.  For example: you may take Tylenol at noon, Ibuprofen at 3:00 p.m., and Tylenol again at 6:00 p.m., etc. You should use ice to assist with pain control as above.  You do not need to take narcotic pain medication unless you are having significant pain.   If you were prescribed a  narcotic pain medication containing Tylenol, such as Percocet or Norco, do not use supplemental Tylenol.      7. Driving: You will need someone to drive you home on the day of surgery or discharge. Do not drive or make any important decisions while on narcotic pain medication or 24 hours and after anesthesia or sedation for surgery. Generally, you may drive when your off all narcotic pain medications and you are comfortable.      8. Upset Stomach: You may take Maalox, Tums, or similar items for an upset stomach. If your narcotic pain medication causes an upset stomach, do not take it on an empty stomach. Try taking it with at least some crackers or toast.      9. Constipation: Patients often experience constipation after surgery. You may take over-the-counter medication for this, such as Metamucil, Senokot, Dulcolax, milk of magnesia, etc. You may take a suppository unless you have had anorectal surgery such as a procedure on your hemorrhoids. If you experience significant nausea or vomiting after abdominal surgery, call the office before trying any of these medications.     10. Call the office: If you are experiencing any of the following: fevers above 101.5°, significant nausea or vomiting, if the wound develops drainage and/or there is excessive redness around the wound, or if you have significant diarrhea or other worsening symptoms.     11. Pain: You may be given a prescription for pain medication.  This will be sent to your pharmacy prior to discharge.     12. Sexual Activity: You may resume sexual activity when you feel ready and comfortable and your incision is sealed and healed without apparent infection risk.     13. Urination: If you have not urinated in 6 hours, go directly to the ER for evaluation for urinary retention.      14. Follow-up in 2 weeks.          **READ ONLY IF YOU HAVE BEEN DISCHARGED WITH A URINARY CATHETER**    Schmidt Insertion for Post-Op Urinary Retention        - A prescription for  Flomax will be sent to your pharmacy.  This should be taken daily while the urinary catheter remains in place.    You will not be given a prescription for Flomax if your prostate has been removed.  If you are already taking Flomax, continue the medication as prescribed.     - We will send a message to the urology group who will contact you within the next 48 hours with further instructions and to schedule an appointment for voiding trial and catheter removal.  The urinary catheter will remain in place for approximately 1 week.  If you are not contacted within the next 48 hours please call our office to assist with scheduling your follow-up.     - If you have your own urologist, you should contact your physician the day after discharge for instructions and to schedule a voiding trial and catheter removal.

## 2024-06-17 NOTE — OP NOTE
OPERATIVE REPORT  PATIENT NAME: Jayshree Alcantara    :  1975  MRN: [id number]  Pt Location: [facility] OR ROOM 06    SURGERY DATE: 2024    Surgeons and Role:     * Jose Hurd MD - Primary  HELIO Arroyo PGY III assist  Preop Diagnosis:  Calculus of gallbladder without cholecystitis without obstruction [K80.20]    Post-Op Diagnosis Codes:     * Calculus of gallbladder without cholecystitis without obstruction [K80.20]    Procedure(s):  CHOLECYSTECTOMY LAP    Specimen(s):  ID Type Source Tests Collected by Time Destination   1 : GALLBLADDER Tissue Gallbladder TISSUE EXAM Jose Hurd MD 2024 1015        Estimated Blood Loss:   10 mL    Drains:  [REMOVED] NG/OG/Enteral Tube Orogastric 18 Fr Center mouth (Removed)   Number of days: 0       Anesthesia Type:   General    Operative Indications:  Calculus of gallbladder without cholecystitis without obstruction [K80.20]      Operative Findings:  Cholelithiasis      Complications:   None    Procedure and Technique:    The patient was seen again in the Holding Room. The risks, benefits, complications, treatment options, and expected outcomes were discussed with the patient. The possibilities of reaction to medication, pulmonary aspiration, perforation of viscus, bleeding, recurrent infection, finding a normal gallbladder, the need for additional procedures, failure to diagnose a condition, the possible need to convert to an open procedure, and creating a complication requiring transfusion or operation were discussed with the patient. The patient and/or family concurred with the proposed plan, giving informed consent. The site of surgery properly noted/marked. The patient was taken to Operating Room, identified as Jayshree Alcantara  and the procedure verified as Laparoscopic Cholecystectomy with possible Intraoperative Cholangiogram. A Time Out was held after prepping and draping in sterile fashion.  The above information was confirmed.    Prior to the induction  of general anesthesia, antibiotic prophylaxis was administered. General endotracheal anesthesia was then administered and tolerated well. After the induction, the abdomen was prepped in the usual sterile fashion. The patient was positioned in the supine position, along with some reverse Trendelenburg.    Local anesthetic agent was injected into the skin near the umbilicus and an incision made. The midline fascia was incised and the open technique was used to introduce a  port under direct vision.  Pneumoperitoneum was then created with CO2 and was tolerated well without any adverse changes in the patient's vital signs. Additional trocars were introduced under direct vision. All skin incisions were infiltrated with a local anesthetic agent before making the incision and placing the trocars.  The patient was placed in reverse Trendelenburg position.    The gallbladder was identified, the fundus grasped and retracted cephalad. Adhesions were lysed bluntly and with the electrocautery where indicated, taking care not to injure any adjacent organs or viscus. The infundibulum was grasped and retracted laterally, exposing the peritoneum overlying the triangle of Calot. This was then dissected anteriorily and posteriorly and exposed in a blunt fashion or using cautery where appropriate. The cystic duct was clearly identified and  dissected circumferentially, as was the cystic artery, as the only two tubular structures leading into the gallbladder .  The critical view of the [place] of [place] was identified.  The posterior aspect of the gallbladder was lifted off the cystic plate, to insure that there were no posterior structres behind the gallbladder.      Once this was all clearly identified, the cystic duct was then doubly ligated with surgical clips and/or Endoloop suture on the patient side and singly clipped on the gallbladder side and divided. The cystic artery was re-identified,  ligated with clips and divided as  well.   The gallbladder was dissected from the liver bed in retrograde fashion with the electrocautery. The gallbladder was placed into an endocatch bag and secured.  The liver bed was irrigated and inspected. Hemostasis was achieved with the electrocautery. Copious irrigation was utilized and was repeatedly aspirated until clear.    The camera was then switched to the lateral port and directed back to the midline. The specimen was removed under direct visualization from the midline incision.  The fascia was then closed using the renfac suture passer and a figure of eight 0 vicryl suture.  Pneumoperitoneum was completely reduced after viewing removal of the trocars under direct vision.  The wound was thoroughly irrigated. The skin was then closed with 4-0 monocryl and histacryl.    Instrument, sponge, and needle counts were correct at closure and at the conclusion of the case.      PA is medically necessary for the surgical safety of the case, including suturing, retracting, and hemostasis.     I was present for the entire procedure.    Patient Disposition:  PACU         SIGNATURE: Jose Hurd MD  DATE: June 17, 2024  TIME: 11:18 AM

## 2024-06-17 NOTE — INTERVAL H&P NOTE
H&P reviewed. After examining the patient I find no changes in the patients condition since the H&P had been written.    Vitals:    06/17/24 0758   BP: 112/69   Pulse: 83   Resp: 16   Temp: 97.8 °F (36.6 °C)   SpO2: 97%

## 2024-06-17 NOTE — ANESTHESIA POSTPROCEDURE EVALUATION
Post-Op Assessment Note    CV Status:  Stable  Pain Score: 0    Pain management: adequate       Mental Status:  Alert and awake   Hydration Status:  Euvolemic   PONV Controlled:  Controlled   Airway Patency:  Patent     Post Op Vitals Reviewed: Yes    No anethesia notable event occurred.    Staff: CRNA               BP   137/57   Temp 97.4   Pulse 83   Resp 20   SpO2 97

## 2024-06-17 NOTE — ANESTHESIA PREPROCEDURE EVALUATION
Procedure:  CHOLECYSTECTOMY LAP (Abdomen)    Relevant Problems   CARDIO   (+) Migraines      GI/HEPATIC   (+) GERD (gastroesophageal reflux disease)      NEURO/PSYCH   (+) Migraines   (+) Moderate major depression, single episode (HCC)   (+) PTSD (post-traumatic stress disorder)   (+) Paresthesia of hand, bilateral        Physical Exam    Airway    Mallampati score: III  TM Distance: >3 FB  Neck ROM: full     Dental   No notable dental hx     Cardiovascular  Cardiovascular exam normal    Pulmonary  Pulmonary exam normal     Other Findings  post-pubertal.    Gastroparesis. Feels her normal amount of bloating this AM. No GERD currently.     Reports on loose tooth in all 4 corners of her mouth  Anesthesia Plan  ASA Score- 2     Anesthesia Type- general with ASA Monitors.         Additional Monitors:     Airway Plan: ETT.           Plan Factors-Exercise tolerance (METS): >4 METS.    Chart reviewed. EKG reviewed.  Existing labs reviewed. Patient summary reviewed.    Patient is not a current smoker.              Induction- intravenous.    Postoperative Plan- Plan for postoperative opioid use.         Informed Consent- Anesthetic plan and risks discussed with patient.  I personally reviewed this patient with the CRNA. Discussed and agreed on the Anesthesia Plan with the CRNA..

## 2024-06-19 PROCEDURE — 88304 TISSUE EXAM BY PATHOLOGIST: CPT | Performed by: PATHOLOGY

## 2024-06-20 DIAGNOSIS — K21.9 GASTROESOPHAGEAL REFLUX DISEASE WITHOUT ESOPHAGITIS: ICD-10-CM

## 2024-06-20 RX ORDER — FAMOTIDINE 40 MG/1
40 TABLET, FILM COATED ORAL
Qty: 90 TABLET | Refills: 1 | Status: SHIPPED | OUTPATIENT
Start: 2024-06-20

## 2024-07-02 ENCOUNTER — OFFICE VISIT (OUTPATIENT)
Dept: SURGERY | Facility: CLINIC | Age: 49
End: 2024-07-02

## 2024-07-02 VITALS
DIASTOLIC BLOOD PRESSURE: 71 MMHG | BODY MASS INDEX: 30.96 KG/M2 | HEART RATE: 86 BPM | SYSTOLIC BLOOD PRESSURE: 103 MMHG | WEIGHT: 153.6 LBS | HEIGHT: 59 IN

## 2024-07-02 DIAGNOSIS — Z90.49 STATUS POST LAPAROSCOPIC CHOLECYSTECTOMY: Primary | ICD-10-CM

## 2024-07-02 PROBLEM — K80.20 CALCULUS OF GALLBLADDER WITHOUT CHOLECYSTITIS WITHOUT OBSTRUCTION: Status: RESOLVED | Noted: 2024-05-22 | Resolved: 2024-07-02

## 2024-07-02 PROCEDURE — 99024 POSTOP FOLLOW-UP VISIT: CPT | Performed by: SURGERY

## 2024-07-02 NOTE — ASSESSMENT & PLAN NOTE
Overall she is doing very well.  No complaints.  She states she has had some improvement in her symptoms.  Restrictions were reviewed.  Follow-up as needed.  Pathology showed chronic cholecystitis with cholelithiasis.

## 2024-07-02 NOTE — PROGRESS NOTES
Ambulatory Visit  Name: Jayshree Alcantara      : 1975      MRN: [id number]  Encounter Provider: Jose Hurd MD  Encounter Date: 2024   Encounter department: [facility] [place]    Assessment & Plan   1. Status post laparoscopic cholecystectomy  Assessment & Plan:  Overall she is doing very well.  No complaints.  She states she has had some improvement in her symptoms.  Restrictions were reviewed.  Follow-up as needed.  Pathology showed chronic cholecystitis with cholelithiasis.        History of Present Illness     Jayshree Alcantara is a 48 y.o. female who presents for evaluation of gallstones.  She has multiple abdominal complaints.  She states when she eats she gets full and bloated very quickly often has nausea in case she has vomiting.  Sometimes she will have pain after eating in the epigastrium and radiates to the right side.  She also complains of epigastric pain and reflux.  When she lies at night she does get the reflux and taste in her mouth.  She was recently started on a second reflux medication but has not had significant treatment yet.  She has no history of gallstones and was evaluated in  but did not undergo surgery at that time.    2024 she is now 2 weeks status post laparoscopic cholecystectomy.  Overall doing very well.  Has had some good improvements in her symptoms.    Review of Systems   Constitutional:  Negative for chills and fever.   HENT:  Negative for ear pain and sore throat.    Eyes:  Negative for pain and visual disturbance.   Respiratory:  Negative for cough and shortness of breath.    Cardiovascular:  Negative for chest pain and palpitations.   Gastrointestinal:  Positive for abdominal pain, nausea and vomiting.   Musculoskeletal:  Negative for arthralgias and back pain.   Skin:  Negative for color change and rash.   Neurological:  Negative for seizures and syncope.   All other systems reviewed and are negative.    Medical History Reviewed by  provider this encounter:       Past Medical History   Past Medical History:   Diagnosis Date    Anxiety     Calculus of gallbladder without cholecystitis without obstruction 05/22/2024    Depression     GERD (gastroesophageal reflux disease)     Migraine     takes excederine migraine     Past Surgical History:   Procedure Laterality Date    DENTAL SURGERY      ENDOMETRIAL ABLATION N/A 4/1/2022    Procedure: ABLATION ENDOMETRIAL NOVASURE;  Surgeon: Chino Ortiz MD;  [facility];  Service: Gynecology    WI HYSTEROSCOPY BX ENDOMETRIUM&/POLYPC W/WO D&C N/A 4/1/2022    Procedure: DILATATION AND CURETTAGE (D&C) WITH HYSTEROSCOPY;  Surgeon: Chino Ortiz MD;  [facility];  Service: Gynecology    WI LAPAROSCOPY SURG CHOLECYSTECTOMY N/A 6/17/2024    Procedure: CHOLECYSTECTOMY LAP;  Surgeon: Jose Hurd MD;  Location: [facility];  Service: General    WI REMOVAL INTRAUTERINE DEVICE IUD N/A 4/1/2022    Procedure: REMOVAL OF INTRAUTERINE DEVICE (IUD);  Surgeon: Chino Ortiz MD;  [facility]: [facility];  Service: Gynecology     Family History   Problem Relation Age of Onset    Breast cancer Mother         65 y/o     BRCA1 Negative Mother     Lung cancer Mother 66    Diabetes Father     Hypertension Father     No Known Problems Daughter     Breast cancer Maternal Grandmother 52    Lung cancer Maternal Grandmother 64    Alzheimer's disease Maternal Grandfather     Heart disease Paternal Grandmother     No Known Problems Paternal Grandfather     No Known Problems Brother     No Known Problems Brother     No Known Problems Brother     No Known Problems Son     No Known Problems Son     No Known Problems Maternal Aunt     No Known Problems Maternal Aunt     No Known Problems Maternal Aunt     No Known Problems Maternal Aunt     Lung cancer Maternal Uncle 50    No Known Problems Paternal Aunt     No Known Problems Paternal Aunt     No Known Problems Paternal Aunt     No Known Problems Paternal Aunt     Breast  cancer Cousin         age dx unknown    Throat cancer Cousin 41    BRCA1 Positive Cousin     Kidney cancer Cousin 41     Current Outpatient Medications on File Prior to Visit   Medication Sig Dispense Refill    EPINEPHrine (EPIPEN) 0.3 mg/0.3 mL SOAJ Inject 0.3 mL (0.3 mg total) into a muscle once as needed for anaphylaxis for up to 1 dose 0.6 mL 0    ergocalciferol (VITAMIN D2) 50,000 units Take 1 capsule (50,000 Units total) by mouth once a week 12 capsule 0    escitalopram (LEXAPRO) 10 mg tablet Take 1 tablet (10 mg total) by mouth daily 30 tablet 2    famotidine (PEPCID) 40 MG tablet TAKE 1 TABLET BY MOUTH EVERYDAY AT BEDTIME 90 tablet 1    hyoscyamine (ANASPAZ,LEVSIN) 0.125 MG tablet TAKE 1 TABLET BY MOUTH EVERY 6 HOURS AS NEEDED FOR CRAMPING. 90 tablet 1    ibuprofen (MOTRIN) 600 mg tablet Take 1 tablet (600 mg total) by mouth every 6 (six) hours as needed for mild pain 15 tablet 0    medroxyPROGESTERone (DEPO-PROVERA) 150 mg/mL injection Inject 1 mL (150 mg total) into a muscle every 3 (three) months 1 mL 0    omeprazole (PriLOSEC) 40 MG capsule TAKE 1 CAPSULE BY MOUTH TWICE A  capsule 1    benzonatate (TESSALON PERLES) 100 mg capsule Take 2 capsules (200 mg total) by mouth 3 (three) times a day as needed for cough (Patient not taking: Reported on 7/2/2024) 30 capsule 0    Cholecalciferol (Vitamin D) 50 MCG (2000 UT) tablet Take 2,000 Units by mouth daily (Patient not taking: Reported on 7/2/2024)      loratadine (CLARITIN) 10 mg tablet TAKE 1 TABLET BY MOUTH EVERY DAY AS NEEDED FOR ALLERGY (Patient not taking: Reported on 7/2/2024) 90 tablet 0    tiZANidine (ZANAFLEX) 4 mg tablet Take 1 tablet (4 mg total) by mouth every 8 (eight) hours as needed for muscle spasms (Patient not taking: Reported on 6/17/2024) 270 tablet 1     Current Facility-Administered Medications on File Prior to Visit   Medication Dose Route Frequency Provider Last Rate Last Admin    medroxyPROGESTERone (DEPO-PROVERA) IM injection  150 mg  150 mg Intramuscular Q3 Months LULU [place]   150 mg at 03/25/24 1795     Allergies   Allergen Reactions    Bee Venom Anaphylaxis    Other Swelling     Patient states that she gets hives and swells when she comes in contact with clorox or bleach products.      Current Outpatient Medications on File Prior to Visit   Medication Sig Dispense Refill    EPINEPHrine (EPIPEN) 0.3 mg/0.3 mL SOAJ Inject 0.3 mL (0.3 mg total) into a muscle once as needed for anaphylaxis for up to 1 dose 0.6 mL 0    ergocalciferol (VITAMIN D2) 50,000 units Take 1 capsule (50,000 Units total) by mouth once a week 12 capsule 0    escitalopram (LEXAPRO) 10 mg tablet Take 1 tablet (10 mg total) by mouth daily 30 tablet 2    famotidine (PEPCID) 40 MG tablet TAKE 1 TABLET BY MOUTH EVERYDAY AT BEDTIME 90 tablet 1    hyoscyamine (ANASPAZ,LEVSIN) 0.125 MG tablet TAKE 1 TABLET BY MOUTH EVERY 6 HOURS AS NEEDED FOR CRAMPING. 90 tablet 1    ibuprofen (MOTRIN) 600 mg tablet Take 1 tablet (600 mg total) by mouth every 6 (six) hours as needed for mild pain 15 tablet 0    medroxyPROGESTERone (DEPO-PROVERA) 150 mg/mL injection Inject 1 mL (150 mg total) into a muscle every 3 (three) months 1 mL 0    omeprazole (PriLOSEC) 40 MG capsule TAKE 1 CAPSULE BY MOUTH TWICE A  capsule 1    benzonatate (TESSALON PERLES) 100 mg capsule Take 2 capsules (200 mg total) by mouth 3 (three) times a day as needed for cough (Patient not taking: Reported on 7/2/2024) 30 capsule 0    Cholecalciferol (Vitamin D) 50 MCG (2000 UT) tablet Take 2,000 Units by mouth daily (Patient not taking: Reported on 7/2/2024)      loratadine (CLARITIN) 10 mg tablet TAKE 1 TABLET BY MOUTH EVERY DAY AS NEEDED FOR ALLERGY (Patient not taking: Reported on 7/2/2024) 90 tablet 0    tiZANidine (ZANAFLEX) 4 mg tablet Take 1 tablet (4 mg total) by mouth every 8 (eight) hours as needed for muscle spasms (Patient not taking: Reported on 6/17/2024) 270 tablet 1     Current  "Facility-Administered Medications on File Prior to Visit   Medication Dose Route Frequency Provider Last Rate Last Admin    medroxyPROGESTERone (DEPO-PROVERA) IM injection 150 mg  150 mg Intramuscular Q3 Months LULU [place]   150 mg at 03/25/24 1542      Social History     Tobacco Use    Smoking status: Never    Smokeless tobacco: Never   Vaping Use    Vaping status: Never Used   Substance and Sexual Activity    Alcohol use: Not Currently     Comment: social    Drug use: No    Sexual activity: Yes     Partners: Male     Birth control/protection: Injection     Objective     /71 (BP Location: Right arm, Patient Position: Sitting, Cuff Size: Large)   Pulse 86   Ht 4' 11\" (1.499 m)   Wt 69.7 kg (153 lb 9.6 oz)   LMP  (LMP Unknown) Comment: LMP 2016- Depo injections  BMI 31.02 kg/m²     Physical Exam  Vitals and nursing note reviewed.   Constitutional:       General: She is not in acute distress.     Appearance: She is well-developed. She is not diaphoretic.   HENT:      Head: Normocephalic and atraumatic.   Eyes:      Pupils: Pupils are equal, round, and reactive to light.   Cardiovascular:      Rate and Rhythm: Normal rate and regular rhythm.   Pulmonary:      Effort: Pulmonary effort is normal. No respiratory distress.   Abdominal:      General: Bowel sounds are normal.      Palpations: Abdomen is soft.      Tenderness: There is no abdominal tenderness.   Musculoskeletal:         General: Normal range of motion.      Cervical back: Normal range of motion and neck supple.   Skin:     General: Skin is warm and dry.   Neurological:      Mental Status: She is alert and oriented to person, place, and time.   Psychiatric:         Behavior: Behavior normal.       Administrative Statements           "

## 2024-07-17 DIAGNOSIS — F32.1 MODERATE MAJOR DEPRESSION, SINGLE EPISODE (HCC): ICD-10-CM

## 2024-07-17 DIAGNOSIS — E55.9 VITAMIN D DEFICIENCY: ICD-10-CM

## 2024-07-17 RX ORDER — ESCITALOPRAM OXALATE 10 MG/1
10 TABLET ORAL DAILY
Qty: 90 TABLET | Refills: 3 | Status: SHIPPED | OUTPATIENT
Start: 2024-07-17

## 2024-07-18 ENCOUNTER — TELEPHONE (OUTPATIENT)
Dept: GASTROENTEROLOGY | Facility: CLINIC | Age: 49
End: 2024-07-18

## 2024-07-18 RX ORDER — ERGOCALCIFEROL 1.25 MG/1
CAPSULE ORAL
Qty: 12 CAPSULE | Refills: 0 | Status: SHIPPED | OUTPATIENT
Start: 2024-07-18

## 2024-07-18 NOTE — TELEPHONE ENCOUNTER
Called and left a message for patient to see if they were going to be self pay or if they had coverage to either call back to add coverage or go online to her mychart and upload the cards there.

## 2024-07-22 NOTE — TELEPHONE ENCOUNTER
Patient contacted office to reschedule her upcoming appointment.  Patient will be a self pay. Did review self pay estimate with patient but was unable to send information to patient.

## 2024-09-12 ENCOUNTER — OFFICE VISIT (OUTPATIENT)
Dept: OBGYN CLINIC | Facility: CLINIC | Age: 49
End: 2024-09-12

## 2024-09-12 DIAGNOSIS — Z30.013 ENCOUNTER FOR INITIAL PRESCRIPTION OF INJECTABLE CONTRACEPTIVE: ICD-10-CM

## 2024-09-12 PROCEDURE — 96372 THER/PROPH/DIAG INJ SC/IM: CPT

## 2024-09-12 RX ORDER — MEDROXYPROGESTERONE ACETATE 150 MG/ML
150 INJECTION, SUSPENSION INTRAMUSCULAR
Qty: 1 ML | Refills: 0 | Status: SHIPPED | OUTPATIENT
Start: 2024-09-12

## 2024-09-12 RX ADMIN — MEDROXYPROGESTERONE ACETATE 150 MG: 150 INJECTION, SUSPENSION INTRAMUSCULAR at 11:18

## 2024-09-12 NOTE — PROGRESS NOTES
Depo-Provera     [x]   Patient provided box yes   0 Refills remain  Refills submitted yes/ Lia  Last  Annual Date / Birth control check : 10/16/23, annual appt on 12/2/24  Last Depo date: 6/10/24  Side effects: no  HCG: yes  if applicable: negative  Given by: Jackelyn Mcfarlane  Site: Right deltoid    Calcium supplement daily teaching  Condoms for 2 weeks following first injection dose.    As per Dr Loza and Dr Fitzgerald ok for appt to be a nurse visit, pt was 3 days past her window for injection.

## 2024-09-29 ENCOUNTER — HOSPITAL ENCOUNTER (EMERGENCY)
Facility: HOSPITAL | Age: 49
Discharge: HOME | End: 2024-09-29
Attending: EMERGENCY MEDICINE

## 2024-09-29 ENCOUNTER — APPOINTMENT (EMERGENCY)
Dept: RADIOLOGY | Facility: HOSPITAL | Age: 49
End: 2024-09-29

## 2024-09-29 VITALS
OXYGEN SATURATION: 96 % | TEMPERATURE: 97.6 F | SYSTOLIC BLOOD PRESSURE: 107 MMHG | HEART RATE: 66 BPM | DIASTOLIC BLOOD PRESSURE: 57 MMHG | RESPIRATION RATE: 18 BRPM

## 2024-09-29 DIAGNOSIS — R19.7 DIARRHEA: ICD-10-CM

## 2024-09-29 DIAGNOSIS — R10.13 EPIGASTRIC PAIN: Primary | ICD-10-CM

## 2024-09-29 DIAGNOSIS — R11.0 NAUSEA: ICD-10-CM

## 2024-09-29 LAB
ALBUMIN SERPL BCG-MCNC: 4.3 G/DL (ref 3.5–5)
ALP SERPL-CCNC: 80 U/L (ref 34–104)
ALT SERPL W P-5'-P-CCNC: 13 U/L (ref 7–52)
ANION GAP SERPL CALCULATED.3IONS-SCNC: 10 MMOL/L (ref 4–13)
AST SERPL W P-5'-P-CCNC: 15 U/L (ref 13–39)
BASOPHILS # BLD AUTO: 0.03 THOUSANDS/ÂΜL (ref 0–0.1)
BASOPHILS NFR BLD AUTO: 0 % (ref 0–1)
BILIRUB SERPL-MCNC: 0.56 MG/DL (ref 0.2–1)
BUN SERPL-MCNC: 14 MG/DL (ref 5–25)
CALCIUM SERPL-MCNC: 8.9 MG/DL (ref 8.4–10.2)
CHLORIDE SERPL-SCNC: 107 MMOL/L (ref 96–108)
CO2 SERPL-SCNC: 23 MMOL/L (ref 21–32)
CREAT SERPL-MCNC: 0.66 MG/DL (ref 0.6–1.3)
EOSINOPHIL # BLD AUTO: 0.05 THOUSAND/ÂΜL (ref 0–0.61)
EOSINOPHIL NFR BLD AUTO: 1 % (ref 0–6)
ERYTHROCYTE [DISTWIDTH] IN BLOOD BY AUTOMATED COUNT: 13 % (ref 11.6–15.1)
GFR SERPL CREATININE-BSD FRML MDRD: 104 ML/MIN/1.73SQ M
GLUCOSE SERPL-MCNC: 147 MG/DL (ref 65–140)
HCG SERPL QL: NEGATIVE
HCT VFR BLD AUTO: 41.3 % (ref 34.8–46.1)
HGB BLD-MCNC: 13.9 G/DL (ref 11.5–15.4)
IMM GRANULOCYTES # BLD AUTO: 0.02 THOUSAND/UL (ref 0–0.2)
IMM GRANULOCYTES NFR BLD AUTO: 0 % (ref 0–2)
LIPASE SERPL-CCNC: 26 U/L (ref 11–82)
LYMPHOCYTES # BLD AUTO: 3.26 THOUSANDS/ÂΜL (ref 0.6–4.47)
LYMPHOCYTES NFR BLD AUTO: 41 % (ref 14–44)
MCH RBC QN AUTO: 30.7 PG (ref 26.8–34.3)
MCHC RBC AUTO-ENTMCNC: 33.7 G/DL (ref 31.4–37.4)
MCV RBC AUTO: 91 FL (ref 82–98)
MONOCYTES # BLD AUTO: 0.43 THOUSAND/ÂΜL (ref 0.17–1.22)
MONOCYTES NFR BLD AUTO: 5 % (ref 4–12)
NEUTROPHILS # BLD AUTO: 4.24 THOUSANDS/ÂΜL (ref 1.85–7.62)
NEUTS SEG NFR BLD AUTO: 53 % (ref 43–75)
NRBC BLD AUTO-RTO: 0 /100 WBCS
PLATELET # BLD AUTO: 272 THOUSANDS/UL (ref 149–390)
PMV BLD AUTO: 11.4 FL (ref 8.9–12.7)
POTASSIUM SERPL-SCNC: 3.2 MMOL/L (ref 3.5–5.3)
PROT SERPL-MCNC: 7.2 G/DL (ref 6.4–8.4)
RBC # BLD AUTO: 4.53 MILLION/UL (ref 3.81–5.12)
SODIUM SERPL-SCNC: 140 MMOL/L (ref 135–147)
WBC # BLD AUTO: 8.03 THOUSAND/UL (ref 4.31–10.16)

## 2024-09-29 PROCEDURE — 36415 COLL VENOUS BLD VENIPUNCTURE: CPT

## 2024-09-29 PROCEDURE — 96365 THER/PROPH/DIAG IV INF INIT: CPT

## 2024-09-29 PROCEDURE — 96361 HYDRATE IV INFUSION ADD-ON: CPT

## 2024-09-29 PROCEDURE — 83690 ASSAY OF LIPASE: CPT

## 2024-09-29 PROCEDURE — 85025 COMPLETE CBC W/AUTO DIFF WBC: CPT

## 2024-09-29 PROCEDURE — 96375 TX/PRO/DX INJ NEW DRUG ADDON: CPT

## 2024-09-29 PROCEDURE — 84703 CHORIONIC GONADOTROPIN ASSAY: CPT

## 2024-09-29 PROCEDURE — 74177 CT ABD & PELVIS W/CONTRAST: CPT

## 2024-09-29 PROCEDURE — 80053 COMPREHEN METABOLIC PANEL: CPT

## 2024-09-29 PROCEDURE — 99284 EMERGENCY DEPT VISIT MOD MDM: CPT

## 2024-09-29 PROCEDURE — 99285 EMERGENCY DEPT VISIT HI MDM: CPT | Performed by: EMERGENCY MEDICINE

## 2024-09-29 PROCEDURE — 93005 ELECTROCARDIOGRAM TRACING: CPT

## 2024-09-29 RX ORDER — POTASSIUM CHLORIDE 14.9 MG/ML
20 INJECTION INTRAVENOUS ONCE
Status: COMPLETED | OUTPATIENT
Start: 2024-09-29 | End: 2024-09-29

## 2024-09-29 RX ORDER — ONDANSETRON 4 MG/1
4 TABLET, FILM COATED ORAL EVERY 6 HOURS
Qty: 12 TABLET | Refills: 0 | Status: SHIPPED | OUTPATIENT
Start: 2024-09-29

## 2024-09-29 RX ORDER — KETOROLAC TROMETHAMINE 30 MG/ML
15 INJECTION, SOLUTION INTRAMUSCULAR; INTRAVENOUS ONCE
Status: COMPLETED | OUTPATIENT
Start: 2024-09-29 | End: 2024-09-29

## 2024-09-29 RX ORDER — POTASSIUM CHLORIDE 1500 MG/1
40 TABLET, EXTENDED RELEASE ORAL ONCE
Status: COMPLETED | OUTPATIENT
Start: 2024-09-29 | End: 2024-09-29

## 2024-09-29 RX ORDER — ONDANSETRON 2 MG/ML
4 INJECTION INTRAMUSCULAR; INTRAVENOUS ONCE
Status: COMPLETED | OUTPATIENT
Start: 2024-09-29 | End: 2024-09-29

## 2024-09-29 RX ADMIN — KETOROLAC TROMETHAMINE 15 MG: 30 INJECTION, SOLUTION INTRAMUSCULAR; INTRAVENOUS at 12:54

## 2024-09-29 RX ADMIN — ONDANSETRON 4 MG: 2 INJECTION INTRAMUSCULAR; INTRAVENOUS at 12:54

## 2024-09-29 RX ADMIN — POTASSIUM CHLORIDE 20 MEQ: 14.9 INJECTION, SOLUTION INTRAVENOUS at 15:15

## 2024-09-29 RX ADMIN — IOHEXOL 85 ML: 350 INJECTION, SOLUTION INTRAVENOUS at 15:41

## 2024-09-29 RX ADMIN — SODIUM CHLORIDE 1000 ML: 0.9 INJECTION, SOLUTION INTRAVENOUS at 12:54

## 2024-09-29 RX ADMIN — POTASSIUM CHLORIDE 40 MEQ: 1500 TABLET, EXTENDED RELEASE ORAL at 15:11

## 2024-09-29 NOTE — ED PROVIDER NOTES
Final diagnoses:   None     ED Disposition       None          Assessment & Plan       Medical Decision Making  Patient is a 49-year-old female presenting for epigastric abdominal pain.  DDx: Cholelithiasis versus cholangitis, pancreatitis, abdominal cramping, electrolyte abnormality, anemia  Based on patient presentation and physical exam findings, my concern is for evaluation of possible biliary pathology.  Plan for CT abdomen pelvis, abdominal lab workup, IV fluids, Toradol, Tylenol, Zofran.  Dispo pending labs and imaging.  Most likely plan for discharge as labs have no remarkable findings satisfied mild hypokalemia.  Potassium ordered.  Dispo pending CT.  Patient signed out to evening team.  Most likely plans for discharge.    Problems Addressed:  Diarrhea: acute illness or injury  Epigastric pain: acute illness or injury  Nausea: acute illness or injury    Amount and/or Complexity of Data Reviewed  Labs: ordered.  Radiology: ordered.    Risk  Prescription drug management.             Medications   potassium chloride 20 mEq IVPB (premix) (20 mEq Intravenous New Bag 9/29/24 1515)   sodium chloride 0.9 % bolus 1,000 mL (0 mL Intravenous Stopped 9/29/24 1354)   ketorolac (TORADOL) injection 15 mg (15 mg Intravenous Given 9/29/24 1254)   ondansetron (ZOFRAN) injection 4 mg (4 mg Intravenous Given 9/29/24 1254)   potassium chloride (Klor-Con M20) CR tablet 40 mEq (40 mEq Oral Given 9/29/24 1511)       ED Risk Strat Scores                           SBIRT 22yo+      Flowsheet Row Most Recent Value   Initial Alcohol Screen: US AUDIT-C     1. How often do you have a drink containing alcohol? 0 Filed at: 09/29/2024 1147   2. How many drinks containing alcohol do you have on a typical day you are drinking?  0 Filed at: 09/29/2024 1147   3a. Male UNDER 65: How often do you have five or more drinks on one occasion? 0 Filed at: 09/29/2024 1147   3b. FEMALE Any Age, or MALE 65+: How often do you have 4 or more drinks on one  occassion? 0 Filed at: 09/29/2024 1147   Audit-C Score 0 Filed at: 09/29/2024 1147   ANGELICA: How many times in the past year have you...    Used an illegal drug or used a prescription medication for non-medical reasons? Never Filed at: 09/29/2024 1147                            History of Present Illness       Chief Complaint   Patient presents with    Abdominal Pain     Pt has c/o epigastric pain for about a week but worse today       Past Medical History:   Diagnosis Date    Anxiety     Calculus of gallbladder without cholecystitis without obstruction 05/22/2024    Depression     GERD (gastroesophageal reflux disease)     Migraine     takes excederine migraine      Past Surgical History:   Procedure Laterality Date    DENTAL SURGERY      ENDOMETRIAL ABLATION N/A 4/1/2022    Procedure: ABLATION ENDOMETRIAL NOVASURE;  Surgeon: Chino Ortiz MD;  [facility];  Service: Gynecology    DE HYSTEROSCOPY BX ENDOMETRIUM&/POLYPC W/WO D&C N/A 4/1/2022    Procedure: DILATATION AND CURETTAGE (D&C) WITH HYSTEROSCOPY;  Surgeon: Chino Ortiz MD;  [facility];  Service: Gynecology    DE LAPAROSCOPY SURG CHOLECYSTECTOMY N/A 6/17/2024    Procedure: CHOLECYSTECTOMY LAP;  Surgeon: Jose Hurd MD;  [facility];  Service: General    DE REMOVAL INTRAUTERINE DEVICE IUD N/A 4/1/2022    Procedure: REMOVAL OF INTRAUTERINE DEVICE (IUD);  Surgeon: Chino Ortiz MD;  [facility];  Service: Gynecology      Family History   Problem Relation Age of Onset    Breast cancer Mother         65 y/o     BRCA1 Negative Mother     Lung cancer Mother 66    Diabetes Father     Hypertension Father     No Known Problems Daughter     Breast cancer Maternal Grandmother 52    Lung cancer Maternal Grandmother 64    Alzheimer's disease Maternal Grandfather     Heart disease Paternal Grandmother     No Known Problems Paternal Grandfather     No Known Problems Brother     No Known Problems Brother     No Known Problems Brother      No Known Problems Son     No Known Problems Son     No Known Problems Maternal Aunt     No Known Problems Maternal Aunt     No Known Problems Maternal Aunt     No Known Problems Maternal Aunt     Lung cancer Maternal Uncle 50    No Known Problems Paternal Aunt     No Known Problems Paternal Aunt     No Known Problems Paternal Aunt     No Known Problems Paternal Aunt     Breast cancer Cousin         age dx unknown    Throat cancer Cousin 41    BRCA1 Positive Cousin     Kidney cancer Cousin 41      Social History     Tobacco Use    Smoking status: Never    Smokeless tobacco: Never   Vaping Use    Vaping status: Never Used   Substance Use Topics    Alcohol use: Not Currently     Comment: social    Drug use: No      E-Cigarette/Vaping    E-Cigarette Use Never User       E-Cigarette/Vaping Substances    Nicotine No     THC No     CBD No     Flavoring No     Other No     Unknown No       I have reviewed and agree with the history as documented.     HPI  Patient is 49-year-old female presenting for epigastric abdominal pain.  Past medical history significant for lap vince on 6/17/2024, GERD, depression/anxiety.  Patient states he has chronic epigastric abdominal pain but had an acute exacerbation last 24 hours that feels slightly different from usual epigastric abdominal pain.  Patient states epigastric abdominal pain has been chronic since her lap vince.  Endorses nausea but no vomiting, says she chronically has diarrhea/ken colored stools.  Denies any fever chills, chest pain, shortness of breath, lightheadedness dizziness.  Review of Systems   Constitutional: Negative.    HENT: Negative.     Eyes: Negative.    Respiratory: Negative.     Cardiovascular: Negative.    Gastrointestinal:  Positive for abdominal pain and nausea. Negative for diarrhea and vomiting.   Endocrine: Negative.    Genitourinary: Negative.    Musculoskeletal: Negative.    Skin: Negative.    Allergic/Immunologic: Negative.    Neurological:  Negative.    Hematological: Negative.    Psychiatric/Behavioral: Negative.     All other systems reviewed and are negative.          Objective       ED Triage Vitals [09/29/24 1146]   Temperature Pulse Blood Pressure Respirations SpO2 Patient Position - Orthostatic VS   97.6 °F (36.4 °C) 97 139/89 18 98 % Sitting      Temp Source Heart Rate Source BP Location FiO2 (%) Pain Score    Temporal Monitor Left arm -- 8      Vitals      Date and Time Temp Pulse SpO2 Resp BP Pain Score FACES Pain Rating User   09/29/24 1428 -- 66 96 % 18 107/57 4 -- KM   09/29/24 1254 -- -- -- -- -- 8 -- MO   09/29/24 1146 97.6 °F (36.4 °C) 97 98 % 18 139/89 8 -- KRR            Physical Exam  Vitals and nursing note reviewed.   Constitutional:       Appearance: Normal appearance. She is normal weight.   HENT:      Head: Normocephalic and atraumatic.      Right Ear: Tympanic membrane, ear canal and external ear normal.      Left Ear: Tympanic membrane, ear canal and external ear normal.      Nose: Nose normal.      Mouth/Throat:      Mouth: Mucous membranes are moist.      Pharynx: Oropharynx is clear.   Eyes:      Extraocular Movements: Extraocular movements intact.      Conjunctiva/sclera: Conjunctivae normal.      Pupils: Pupils are equal, round, and reactive to light.   Cardiovascular:      Rate and Rhythm: Normal rate and regular rhythm.      Pulses: Normal pulses.      Heart sounds: Normal heart sounds.   Pulmonary:      Effort: Pulmonary effort is normal.      Breath sounds: Normal breath sounds.   Abdominal:      General: Abdomen is flat. Bowel sounds are normal. There is no distension.      Palpations: Abdomen is soft.      Tenderness: There is generalized abdominal tenderness and tenderness in the epigastric area. There is no guarding or rebound.      Comments: Patient is generalized abdominal tenderness to palpation but focus in the epigastric area.  No rebound no guarding.  Normoactive bowel sounds.   Musculoskeletal:          General: Normal range of motion.      Cervical back: Normal range of motion and neck supple.   Skin:     General: Skin is warm and dry.      Capillary Refill: Capillary refill takes less than 2 seconds.   Neurological:      General: No focal deficit present.      Mental Status: She is alert and oriented to person, place, and time.   Psychiatric:         Mood and Affect: Mood normal.         Behavior: Behavior normal.         Thought Content: Thought content normal.         Judgment: Judgment normal.         Results Reviewed       Procedure Component Value Units Date/Time    Comprehensive metabolic panel [[id number]]  (Abnormal) Collected: 09/29/24 1253    Lab Status: Final result Specimen: Blood from Arm, Left Updated: 09/29/24 1346     Sodium 140 mmol/L      Potassium 3.2 mmol/L      Chloride 107 mmol/L      CO2 23 mmol/L      ANION GAP 10 mmol/L      BUN 14 mg/dL      Creatinine 0.66 mg/dL      Glucose 147 mg/dL      Calcium 8.9 mg/dL      AST 15 U/L      ALT 13 U/L      Alkaline Phosphatase 80 U/L      Total Protein 7.2 g/dL      Albumin 4.3 g/dL      Total Bilirubin 0.56 mg/dL      eGFR 104 ml/min/1.73sq m     Narrative:      National Kidney Disease Foundation guidelines for Chronic Kidney Disease (CKD):     Stage 1 with normal or high GFR (GFR > 90 mL/min/1.73 square meters)    Stage 2 Mild CKD (GFR = 60-89 mL/min/1.73 square meters)    Stage 3A Moderate CKD (GFR = 45-59 mL/min/1.73 square meters)    Stage 3B Moderate CKD (GFR = 30-44 mL/min/1.73 square meters)    Stage 4 Severe CKD (GFR = 15-29 mL/min/1.73 square meters)    Stage 5 End Stage CKD (GFR <15 mL/min/1.73 square meters)  Note: GFR calculation is accurate only with a steady state creatinine    Lipase [[id number]]  (Normal) Collected: 09/29/24 1253    Lab Status: Final result Specimen: Blood from Arm, Left Updated: 09/29/24 1346     Lipase 26 u/L     hCG, qualitative pregnancy [[id number]]  (Normal) Collected: 09/29/24 1253    Lab Status: Final result  Specimen: Blood from Arm, Left Updated: 09/29/24 1346     Preg, Serum Negative    CBC and differential [[id number]] Collected: 09/29/24 1253    Lab Status: Final result Specimen: Blood from Arm, Left Updated: 09/29/24 1311     WBC 8.03 Thousand/uL      RBC 4.53 Million/uL      Hemoglobin 13.9 g/dL      Hematocrit 41.3 %      MCV 91 fL      MCH 30.7 pg      MCHC 33.7 g/dL      RDW 13.0 %      MPV 11.4 fL      Platelets 272 Thousands/uL      nRBC 0 /100 WBCs      Segmented % 53 %      Immature Grans % 0 %      Lymphocytes % 41 %      Monocytes % 5 %      Eosinophils Relative 1 %      Basophils Relative 0 %      Absolute Neutrophils 4.24 Thousands/µL      Absolute Immature Grans 0.02 Thousand/uL      Absolute Lymphocytes 3.26 Thousands/µL      Absolute Monocytes 0.43 Thousand/µL      Eosinophils Absolute 0.05 Thousand/µL      Basophils Absolute 0.03 Thousands/µL             CT abdomen pelvis with contrast    (Results Pending)       Procedures    ED Medication and Procedure Management   Prior to Admission Medications   Prescriptions Last Dose Informant Patient Reported? Taking?   Cholecalciferol (Vitamin D) 50 MCG (2000 UT) tablet  Self Yes No   Sig: Take 2,000 Units by mouth daily   Patient not taking: Reported on 7/2/2024   EPINEPHrine (EPIPEN) 0.3 mg/0.3 mL SOAJ  Self No No   Sig: Inject 0.3 mL (0.3 mg total) into a muscle once as needed for anaphylaxis for up to 1 dose   benzonatate (TESSALON PERLES) 100 mg capsule   No No   Sig: Take 2 capsules (200 mg total) by mouth 3 (three) times a day as needed for cough   Patient not taking: Reported on 7/2/2024   ergocalciferol (VITAMIN D2) 50,000 units   No No   Sig: TAKE 1 CAPSULE BY MOUTH ONE TIME PER WEEK   escitalopram (LEXAPRO) 10 mg tablet   No No   Sig: Take 1 tablet (10 mg total) by mouth daily   famotidine (PEPCID) 40 MG tablet   No No   Sig: TAKE 1 TABLET BY MOUTH EVERYDAY AT BEDTIME   hyoscyamine (ANASPAZ,LEVSIN) 0.125 MG tablet  Self No No   Sig: TAKE 1 TABLET BY  MOUTH EVERY 6 HOURS AS NEEDED FOR CRAMPING.   ibuprofen (MOTRIN) 600 mg tablet  Self No No   Sig: Take 1 tablet (600 mg total) by mouth every 6 (six) hours as needed for mild pain   loratadine (CLARITIN) 10 mg tablet  Self No No   Sig: TAKE 1 TABLET BY MOUTH EVERY DAY AS NEEDED FOR ALLERGY   Patient not taking: Reported on 7/2/2024   medroxyPROGESTERone (DEPO-PROVERA) 150 mg/mL injection   No No   Sig: Inject 1 mL (150 mg total) into a muscle every 3 (three) months   omeprazole (PriLOSEC) 40 MG capsule  Self No No   Sig: TAKE 1 CAPSULE BY MOUTH TWICE A DAY   tiZANidine (ZANAFLEX) 4 mg tablet  Self No No   Sig: Take 1 tablet (4 mg total) by mouth every 8 (eight) hours as needed for muscle spasms   Patient not taking: Reported on 6/17/2024      Facility-Administered Medications Last Administration Doses Remaining   medroxyPROGESTERone (DEPO-PROVERA) IM injection 150 mg 9/12/2024 11:18 AM         Patient's Medications   Discharge Prescriptions    No medications on file     No discharge procedures on file.  ED SEPSIS DOCUMENTATION            Blu Marshall MD  09/29/24 [id number]

## 2024-09-29 NOTE — DISCHARGE INSTRUCTIONS
Please follow-up at your previously scheduled gastroenterology appointment.  Please continue Tylenol and Motrin as instructed for conservative treatment of your symptoms.  A Zofran prescription has been sent to your pharmacy for management of nausea.    Your CT results are below:  CT abdomen pelvis with contrast    Result Date: 9/29/2024  Impression: No source for pain identified in the abdomen or pelvis. Workstation performed: [id number]

## 2024-09-29 NOTE — ED NOTES
Pt requested to stop IV Potassium infusion since she is discharged. Dr. Vanegas aware and okay with stopping IV infusion prior to medication finishing. Medication stopped and IV removed.      Richard Florentino RN  09/29/24 2305

## 2024-09-29 NOTE — ED ATTENDING ATTESTATION
9/29/2024  I, Siva Urias DO, saw and evaluated the patient. I have discussed the patient with the resident/non-physician practitioner and agree with the resident's/non-physician practitioner's findings, Plan of Care, and MDM as documented in the resident's/non-physician practitioner's note, except where noted. All available labs and Radiology studies were reviewed.  I was present for key portions of any procedure(s) performed by the resident/non-physician practitioner and I was immediately available to provide assistance.       At this point I agree with the current assessment done in the Emergency Department.  I have conducted an independent evaluation of this patient a history and physical is as follows:    Patient is a 49-year-old female with a history of GERD, depression, anxiety, chronic abdominal pain, status post cholecystectomy June 17, 2024 for symptomatic cholelithiasis.  Patient says that when she was in [place] if she would have some epigastric pain, some occasional diarrhea after eating, was followed by GI there.  She then came here to the [facility], had an outpatient ultrasound which showed cholelithiasis, and in the course of seeing her GI physicians here, recommended that she follow-up with surgery.  She had the above-mentioned laparoscopic cholecystectomy on June 17, and then about a month later began having some epigastric pain which is typically postprandial, sometimes she will have diarrhea after eating, sometimes not, sometimes have some diarrhea randomly.  She does not always have postprandial pain but happens relatively frequently.  No dysuria, hematuria, no vaginal bleeding or discharge.  Some nausea but no vomiting.  No travel history or sick contacts.  She has an appointment with her gastroenterology specialists in about a week and a half or 2 weeks but came today because of the persistence of the discomfort.  Her daughter who accompanies her is unaware of any sick contacts  that she is otherwise been doing well.    General:  Patient is well-appearing  Head:  Atraumatic  Eyes:  Conjunctiva pink  ENT:  Mucous membranes are moist  Neck:  Supple  Cardiac:  S1-S2, without murmurs  Lungs:  Clear to auscultation bilaterally  Abdomen: Mild epigastric tenderness, no tympany, no rigidity, no guarding, no CVA tenderness  Extremities:  Normal range of motion  Neurologic:  Awake, fluent speech, normal comprehension, AAOx3  Skin:  Pink warm and dry  Psychiatric:  Alert, pleasant, cooperative      ED Course     ECG interpreted me, sinus rhythm rate of 86, no acute ST segment elevation or depression there are some nonspecific T wave flattening in the lateral leads, similar to September 1, 2021    CT abdomen pelvis with contrast   Final Result      No source for pain identified in the abdomen or pelvis.         Workstation performed: [id number]           Labs Reviewed   COMPREHENSIVE METABOLIC PANEL - Abnormal       Result Value Ref Range Status    Sodium 140  135 - 147 mmol/L Final    Potassium 3.2 (*) 3.5 - 5.3 mmol/L Final    Chloride 107  96 - 108 mmol/L Final    CO2 23  21 - 32 mmol/L Final    ANION GAP 10  4 - 13 mmol/L Final    BUN 14  5 - 25 mg/dL Final    Creatinine 0.66  0.60 - 1.30 mg/dL Final    Comment: Standardized to IDMS reference method    Glucose 147 (*) 65 - 140 mg/dL Final    Comment: If the patient is fasting, the ADA then defines impaired fasting glucose as > 100 mg/dL and diabetes as > or equal to 123 mg/dL.    Calcium 8.9  8.4 - 10.2 mg/dL Final    AST 15  13 - 39 U/L Final    ALT 13  7 - 52 U/L Final    Comment: Specimen collection should occur prior to Sulfasalazine administration due to the potential for falsely depressed results.     Alkaline Phosphatase 80  34 - 104 U/L Final    Total Protein 7.2  6.4 - 8.4 g/dL Final    Albumin 4.3  3.5 - 5.0 g/dL Final    Total Bilirubin 0.56  0.20 - 1.00 mg/dL Final    Comment: Use of this assay is not recommended for patients undergoing  treatment with eltrombopag due to the potential for falsely elevated results.  N-acetyl-p-benzoquinone imine (metabolite of Acetaminophen) will generate erroneously low results in samples for patients that have taken an overdose of Acetaminophen.    eGFR 104  ml/min/1.73sq m Final    Narrative:     National Kidney Disease Foundation guidelines for Chronic Kidney Disease (CKD):     Stage 1 with normal or high GFR (GFR > 90 mL/min/1.73 square meters)    Stage 2 Mild CKD (GFR = 60-89 mL/min/1.73 square meters)    Stage 3A Moderate CKD (GFR = 45-59 mL/min/1.73 square meters)    Stage 3B Moderate CKD (GFR = 30-44 mL/min/1.73 square meters)    Stage 4 Severe CKD (GFR = 15-29 mL/min/1.73 square meters)    Stage 5 End Stage CKD (GFR <15 mL/min/1.73 square meters)  Note: GFR calculation is accurate only with a steady state creatinine   LIPASE - Normal    Lipase 26  11 - 82 u/L Final   PREGNANCY TEST (HCG QUALITATIVE) - Normal    Preg, Serum Negative  Negative Final   CBC AND DIFFERENTIAL    WBC 8.03  4.31 - 10.16 Thousand/uL Final    RBC 4.53  3.81 - 5.12 Million/uL Final    Hemoglobin 13.9  11.5 - 15.4 g/dL Final    Hematocrit 41.3  34.8 - 46.1 % Final    MCV 91  82 - 98 fL Final    MCH 30.7  26.8 - 34.3 pg Final    MCHC 33.7  31.4 - 37.4 g/dL Final    RDW 13.0  11.6 - 15.1 % Final    MPV 11.4  8.9 - 12.7 fL Final    Platelets 272  149 - 390 Thousands/uL Final    nRBC 0  /100 WBCs Final    Segmented % 53  43 - 75 % Final    Immature Grans % 0  0 - 2 % Final    Lymphocytes % 41  14 - 44 % Final    Monocytes % 5  4 - 12 % Final    Eosinophils Relative 1  0 - 6 % Final    Basophils Relative 0  0 - 1 % Final    Absolute Neutrophils 4.24  1.85 - 7.62 Thousands/µL Final    Absolute Immature Grans 0.02  0.00 - 0.20 Thousand/uL Final    Absolute Lymphocytes 3.26  0.60 - 4.47 Thousands/µL Final    Absolute Monocytes 0.43  0.17 - 1.22 Thousand/µL Final    Eosinophils Absolute 0.05  0.00 - 0.61 Thousand/µL Final    Basophils Absolute  0.03  0.00 - 0.10 Thousands/µL Final     At this point the cause of the patient's recurrent abdominal pain is unclear but unlikely to represent an acute emergency.  She has no evidence of pancreatitis, no sign of lack of ectopic pregnancy, no sign of bowel obstruction.  I do not believe is represents acute mesenteric ischemia, acute coronary syndrome or other acute life-threatening pathology.While the cause of the patient's complaints is most likely benign, it is possible that this is the early presentation of a more serious condition. This diagnostic uncertainty was discussed with the patient, as was the importance of follow up care, as well as the need to return to immediately return to the closest emergency department for the signs/symptoms in the discharge instruction sheets, or they were otherwise concerned about their medical condition. The patient stated they were aware of this diagnostic uncertainty, understood the importance of follow up and were comfortable being discharged.  Supportive care, importance of follow-up and return precautions were discussed with the patient, who expressed understanding.      DIAGNOSIS:  Acute recurrent abdominal pain    MEDICAL DECISION MAKING CODING    COLLECTION AND INTERPRETATION OF DATA  I reviewed prior external notes, including June 17, 2024 operative note    I ordered each unique test  Tests reviewed personally by me:  ECG: See my ED course  Labs: See see above  Imaging: I independently interpreted the CT abdomen and pelvis and found no bowel obstruction, no acute pathology.            Critical Care Time  Procedures

## 2024-09-30 LAB
ATRIAL RATE: 86 BPM
P AXIS: 43 DEGREES
PR INTERVAL: 122 MS
QRS AXIS: 3 DEGREES
QRSD INTERVAL: 70 MS
QT INTERVAL: 372 MS
QTC INTERVAL: 445 MS
T WAVE AXIS: -8 DEGREES
VENTRICULAR RATE: 86 BPM

## 2024-09-30 PROCEDURE — 93010 ELECTROCARDIOGRAM REPORT: CPT | Performed by: INTERNAL MEDICINE

## 2024-10-11 ENCOUNTER — OFFICE VISIT (OUTPATIENT)
Dept: GASTROENTEROLOGY | Facility: CLINIC | Age: 49
End: 2024-10-11
Payer: COMMERCIAL

## 2024-10-11 VITALS
DIASTOLIC BLOOD PRESSURE: 64 MMHG | TEMPERATURE: 98.8 F | BODY MASS INDEX: 29.8 KG/M2 | HEIGHT: 59 IN | SYSTOLIC BLOOD PRESSURE: 118 MMHG | WEIGHT: 147.8 LBS

## 2024-10-11 DIAGNOSIS — R10.13 EPIGASTRIC PAIN: Primary | ICD-10-CM

## 2024-10-11 DIAGNOSIS — R19.4 CHANGE IN BOWEL HABIT: ICD-10-CM

## 2024-10-11 DIAGNOSIS — R19.7 DIARRHEA, UNSPECIFIED TYPE: ICD-10-CM

## 2024-10-11 DIAGNOSIS — R11.0 NAUSEA: ICD-10-CM

## 2024-10-11 DIAGNOSIS — K21.9 GASTROESOPHAGEAL REFLUX DISEASE, UNSPECIFIED WHETHER ESOPHAGITIS PRESENT: ICD-10-CM

## 2024-10-11 DIAGNOSIS — R63.4 WEIGHT LOSS, UNINTENTIONAL: ICD-10-CM

## 2024-10-11 DIAGNOSIS — Z90.49 HISTORY OF CHOLECYSTECTOMY: ICD-10-CM

## 2024-10-11 PROCEDURE — 99214 OFFICE O/P EST MOD 30 MIN: CPT | Performed by: PHYSICIAN ASSISTANT

## 2024-10-11 RX ORDER — OMEPRAZOLE 40 MG/1
40 CAPSULE, DELAYED RELEASE ORAL 2 TIMES DAILY
Qty: 180 CAPSULE | Refills: 1 | Status: SHIPPED | OUTPATIENT
Start: 2024-10-11

## 2024-10-11 NOTE — PROGRESS NOTES
[facility] - Outpatient Follow-up Note  Jayshree Alcantara 49 y.o. female MRN: [id number]  Encounter: [id number]    ASSESSMENT AND PLAN:    1. Epigastric pain  2. Gastroesophageal reflux disease, unspecified whether esophagitis present  3. Nausea  4. History of cholecystectomy  5. Change in bowel habit  6. Diarrhea, unspecified type  7. Weight loss, unintentional  Patient with ongoing epigastric pain, GERD symptoms status post cholecystectomy.  Also with worsening diarrhea and unintentional weight loss.  We reviewed recent CT scan and labs from the ER which were normal which is reassuring.    At this time I recommend we increase her PPI to twice daily, patient will start omeprazole 40 mg 30 minutes before breakfast and 30 minutes before dinner.  She will continue famotidine 40 mg once daily at bedtime in addition to this  Will order EGD with biopsy to r/o esophagitis/ gastritis/ H pylori/ PUD. I educated patient on GERD diet and lifestyle including avoidance of trigger foods including fatty, greasy, spicy foods, chocolate, caffeine, alcohol, citrus foods, tomato sauces. We discussed  the importance of eating smaller frequent meals, not eating close to bedtime. I also advised to limit NSAIDs if able.  Handout on GERD and GERD diet was provided in the office today  Given worsening diarrhea and unintentional weight loss we will also plan for colonoscopy with TI intubation and pancolonic biopsies at time of EGD to rule out possible underlying microscopic colitis or inflammatory bowel disease.  Otherwise I recommended patient begin taking Metamucil powder fiber supplement once daily at dinnertime to help bulk and regulate stools.  Continue hyoscyamine as needed    Will consider starting cholestyramine in follow-up pending above results      - EGD; Future  - omeprazole (PriLOSEC) 40 MG capsule; Take 1 capsule (40 mg total) by mouth 2 (two) times a day  Dispense: 180 capsule; Refill: 1  -  Colonoscopy; Future  - polyethylene glycol (GOLYTELY) 4000 mL solution; Take 4,000 mL by mouth once for 1 dose Take as directed by office instructions prior to colonoscopy.  Dispense: 4000 mL; Refill: 0      The risk/benefits/alternatives of the procedure/s were discussed with the patient.  Risks included, but not limited to, infection, bleeding, perforation, injury to organs in the abdomen, missed lesion and incomplete procedure were discussed.  Patient expressed understanding and agreeable to proceed with procedure/s.    Patient was instructed to call the office with any questions, concerns, new/ worsening/ persisting GI symptoms. Advised patient go to the ER with any severe or worsening abdominal pain, fevers/ chills, intractable N/V, chest pain, SOB, dizziness, lightheadedness, feeling something stuck in esophagus that will not go down. Patient expressed understanding and is in agreement with treatment plan.     Will plan to follow up after diagnostic tests   __________________________________________________________    SUBJECTIVE:     Patient with past medical history of migraines, GERD, gastroparesis, history of IBS, SIBO, depression, PTSD known gallstones presents to the office today for follow-up.  Patient was last seen in the office by me 5/3/2024, previous office note was reviewed.  At last office visit I ordered a right upper quadrant ultrasound as well as CBC, CMP, stool studies.  I recommended patient continue with omeprazole 40 mg once daily.  CBC and CMP were completed 5/3/2024, these were reviewed, these were normal aside from slight leukocytosis.  Stool studies were completed 5/3/2024, these were reviewed and negative for infection  Right upper quadrant ultrasound was completed 5/3/2024, this was reviewed, this showed evidence of gallstones.   Patient went on to undergo laparoscopic cholecystectomy on 6/17/2024  Unfortunately, since last office visit, patient was seen in the ER 9/29/2024  with  complaints of epigastric abdominal pain.  ER note reviewed.  She underwent laboratory testing which was normal including normal CBC, CMP, lipase.  CT abdomen and pelvis with IV contrast was completed and this was also normal.    Patient daughter and granddaughter is present for visit today.   Patient reports ongoing epigastric abdominal x several months.   She reports little to no improvement status post cholecystectomy.  Abdominal pain is postprandial.  She tells me she is scared to eat.  She describes it as burning.  And there is associated nausea, heartburn, acid reflux.  Symptoms persist despite taking omeprazole 40 mg once daily and famotidine 40 mg once daily at bedtime  She also describes worsening diarrhea for the last few months since cholecystectomy.  She can also have periods of constipation however.  She has had rare nocturnal stools.  She has used hyoscyamine with some relief of abdominal pain  Patient did lose over 10 pounds since last office visit. She is not trying to lose weight.   Patient denies any fevers/ chills, vomiting, black or bloody stools, dysphagia, odynophagia.   Denies chest pain, SOB      Tobacco use- None  Alcohol use- None  NSAID use- None  Patient denies first-degree relative with colon cancer, inflammatory bowel disease, celiac disease       Review of Systems   Constitutional:  Positive for unexpected weight change. Negative for chills and fever.   HENT:  Negative for ear pain and sore throat.    Eyes:  Negative for pain and visual disturbance.   Respiratory:  Negative for cough and shortness of breath.    Cardiovascular:  Negative for chest pain and palpitations.   Gastrointestinal:  Positive for abdominal pain, diarrhea and nausea. Negative for vomiting.   Genitourinary:  Negative for dysuria and hematuria.   Musculoskeletal:  Negative for arthralgias and back pain.   Skin:  Negative for color change and rash.   Neurological:  Negative for seizures and syncope.   All other  systems reviewed and are negative.         Historical Information   Past Medical History:   Diagnosis Date    Anxiety     Calculus of gallbladder without cholecystitis without obstruction 05/22/2024    Depression     GERD (gastroesophageal reflux disease)     Migraine     takes excederine migraine     Past Surgical History:   Procedure Laterality Date    DENTAL SURGERY      ENDOMETRIAL ABLATION N/A 4/1/2022    Procedure: ABLATION ENDOMETRIAL NOVASURE;  Surgeon: Chino Ortiz MD;  Location: [facility];  Service: Gynecology    MA HYSTEROSCOPY BX ENDOMETRIUM&/POLYPC W/WO D&C N/A 4/1/2022    Procedure: DILATATION AND CURETTAGE (D&C) WITH HYSTEROSCOPY;  Surgeon: Chino Ortiz MD;  Location: [facility];  Service: Gynecology    MA LAPAROSCOPY SURG CHOLECYSTECTOMY N/A 6/17/2024    Procedure: CHOLECYSTECTOMY LAP;  Surgeon: Jose Hurd MD;  Location: [facility];  Service: General    MA REMOVAL INTRAUTERINE DEVICE IUD N/A 4/1/2022    Procedure: REMOVAL OF INTRAUTERINE DEVICE (IUD);  Surgeon: Chino Ortiz MD;  Location: [facility];  Service: Gynecology     Social History   Social History     Substance and Sexual Activity   Alcohol Use Not Currently    Comment: social     Social History     Substance and Sexual Activity   Drug Use No     Social History     Tobacco Use   Smoking Status Never   Smokeless Tobacco Never     Family History   Problem Relation Age of Onset    Breast cancer Mother         63 y/o     BRCA1 Negative Mother     Lung cancer Mother 66    Diabetes Father     Hypertension Father     No Known Problems Daughter     Breast cancer Maternal Grandmother 52    Lung cancer Maternal Grandmother 64    Alzheimer's disease Maternal Grandfather     Heart disease Paternal Grandmother     No Known Problems Paternal Grandfather     No Known Problems Brother     No Known Problems Brother     No Known Problems Brother     No Known Problems Son     No Known Problems Son     No Known Problems Maternal Aunt     No Known  Problems Maternal Aunt     No Known Problems Maternal Aunt     No Known Problems Maternal Aunt     Lung cancer Maternal Uncle 50    No Known Problems Paternal Aunt     No Known Problems Paternal Aunt     No Known Problems Paternal Aunt     No Known Problems Paternal Aunt     Breast cancer Cousin         age dx unknown    Throat cancer Cousin 41    BRCA1 Positive Cousin     Kidney cancer Cousin 41       Meds/Allergies       Current Outpatient Medications:     benzonatate (TESSALON PERLES) 100 mg capsule    Cholecalciferol (Vitamin D) 50 MCG (2000 UT) tablet    EPINEPHrine (EPIPEN) 0.3 mg/0.3 mL SOAJ    ergocalciferol (VITAMIN D2) 50,000 units    escitalopram (LEXAPRO) 10 mg tablet    famotidine (PEPCID) 40 MG tablet    hyoscyamine (ANASPAZ,LEVSIN) 0.125 MG tablet    ibuprofen (MOTRIN) 600 mg tablet    loratadine (CLARITIN) 10 mg tablet    medroxyPROGESTERone (DEPO-PROVERA) 150 mg/mL injection    omeprazole (PriLOSEC) 40 MG capsule    ondansetron (ZOFRAN) 4 mg tablet    tiZANidine (ZANAFLEX) 4 mg tablet    Current Facility-Administered Medications:     medroxyPROGESTERone (DEPO-PROVERA) IM injection 150 mg, 150 mg, Intramuscular, Q3 Months, 150 mg at 09/12/24 1118    Allergies   Allergen Reactions    Bee Venom Anaphylaxis    Other Swelling     Patient states that she gets hives and swells when she comes in contact with clorox or bleach products.           Objective     Wt Readings from Last 3 Encounters:   07/02/24 69.7 kg (153 lb 9.6 oz)   06/17/24 70.4 kg (155 lb 3.3 oz)   05/24/24 72 kg (158 lb 12.8 oz)     Temp Readings from Last 3 Encounters:   09/29/24 97.6 °F (36.4 °C) (Temporal)   06/17/24 98.3 °F (36.8 °C) (Temporal)   05/24/24 98.5 °F (36.9 °C) (Temporal)     BP Readings from Last 3 Encounters:   09/29/24 107/57   07/02/24 103/71   06/17/24 109/72     Pulse Readings from Last 3 Encounters:   09/29/24 66   07/02/24 86   06/17/24 85          PHYSICAL EXAM:      Physical Exam  Vitals reviewed.   Constitutional:        General: She is not in acute distress.     Appearance: She is obese. She is not ill-appearing, toxic-appearing or diaphoretic.   HENT:      Head: Normocephalic and atraumatic.   Eyes:      Extraocular Movements: Extraocular movements intact.      Conjunctiva/sclera: Conjunctivae normal.   Cardiovascular:      Rate and Rhythm: Normal rate and regular rhythm.   Pulmonary:      Effort: Pulmonary effort is normal. No respiratory distress.      Breath sounds: Normal breath sounds.   Abdominal:      General: Bowel sounds are normal.      Palpations: Abdomen is soft.      Tenderness: There is abdominal tenderness in the epigastric area.   Musculoskeletal:         General: No swelling or tenderness.      Cervical back: Normal range of motion and neck supple.   Skin:     General: Skin is warm and dry.      Coloration: Skin is not jaundiced.   Neurological:      General: No focal deficit present.      Mental Status: She is alert and oriented to person, place, and time. Mental status is at baseline.   Psychiatric:         Mood and Affect: Mood normal.         Behavior: Behavior normal.         Thought Content: Thought content normal.        Lab Results:   Lab Results   Component Value Date    WBC 8.03 09/29/2024    HGB 13.9 09/29/2024    HCT 41.3 09/29/2024    MCV 91 09/29/2024     09/29/2024       Lab Results   Component Value Date     08/23/2015    SODIUM 140 09/29/2024    K 3.2 (L) 09/29/2024     09/29/2024    CO2 23 09/29/2024    ANIONGAP 8 08/23/2015    AGAP 10 09/29/2024    BUN 14 09/29/2024    CREATININE 0.66 09/29/2024    GLUC 147 (H) 09/29/2024    GLUF 95 04/26/2024    CALCIUM 8.9 09/29/2024    AST 15 09/29/2024    ALT 13 09/29/2024    ALKPHOS 80 09/29/2024    PROT 6.9 08/23/2015    TP 7.2 09/29/2024    BILITOT 0.13 (L) 08/23/2015    TBILI 0.56 09/29/2024    EGFR 104 09/29/2024       Lab Results   Component Value Date    WKZ1XUBJVWAU 0.874 03/02/2023     Radiology Results:   CT abdomen pelvis  with contrast    Result Date: 9/29/2024  Narrative: CT ABDOMEN AND PELVIS WITH IV CONTRAST INDICATION: epigastric abd pain, nausea COMPARISON: Abdominal ultrasound from 5/3/2024, and abdomen pelvic CT from 9/1/2021 TECHNIQUE: CT examination of the abdomen and pelvis was performed. Multiplanar 2D reformatted images were created from the source data. This examination, like all CT scans performed in the [facility], was performed utilizing techniques to minimize radiation dose exposure, including the use of iterative reconstruction and automated exposure control. Radiation dose length product (DLP) for this visit: 385 mGy-cm IV Contrast: 85 mL of iohexol (OMNIPAQUE) Enteric Contrast: Not administered. FINDINGS: ABDOMEN LOWER CHEST: No clinically significant abnormality in the visualized lower chest. LIVER/BILIARY TREE: Focal fat adjacent to the falciform ligament, otherwise unremarkable abdomen. GALLBLADDER: Post cholecystectomy. SPLEEN: Unremarkable. PANCREAS: Unremarkable. ADRENAL GLANDS: Unremarkable. KIDNEYS/URETERS: Unremarkable. No hydronephrosis. STOMACH AND BOWEL: Unremarkable. APPENDIX: Normal. ABDOMINOPELVIC CAVITY: No ascites. No pneumoperitoneum. No lymphadenopathy. VESSELS: Unremarkable for patient's age. PELVIS REPRODUCTIVE ORGANS: Unremarkable for patient's age. URINARY BLADDER: Unremarkable. ABDOMINAL WALL/INGUINAL REGIONS: Unremarkable. BONES: No acute fracture or suspicious osseous lesion.     Impression: No source for pain identified in the abdomen or pelvis. Workstation performed: [id number]     Patient underwent nuclear medicine gastric emptying scan 6/27/2023, this was reviewed, this showed a delayed rate of gastric emptying consistent with gastroparesis.     Patient underwent ultrasound pelvis complete with transvaginal 3/6/2023, this showed a uterine fibroid, otherwise grossly unremarkable.     Patient underwent breath test which was positive for both SIBO and IMO and she  completed a 14-day course of Xifaxan and neomycin in the past     She had a RUQ US 9/1/2021 which showed gallstones     Prior Procedure Results/Reports   Patient underwent both EGD and colonoscopy in 2021 at outside hospital in [place], these per reports were reviewed, both EGD and colonoscopy were completely normal. A repeat colonoscopy was recommended to be completed in 10 years.     Karina Hinojosa PA-C

## 2024-10-11 NOTE — H&P (VIEW-ONLY)
[facility] - Outpatient Follow-up Note  Jayshree Alcantara 49 y.o. female MRN: [id number]  Encounter: [id number]    ASSESSMENT AND PLAN:    1. Epigastric pain  2. Gastroesophageal reflux disease, unspecified whether esophagitis present  3. Nausea  4. History of cholecystectomy  5. Change in bowel habit  6. Diarrhea, unspecified type  7. Weight loss, unintentional  Patient with ongoing epigastric pain, GERD symptoms status post cholecystectomy.  Also with worsening diarrhea and unintentional weight loss.  We reviewed recent CT scan and labs from the ER which were normal which is reassuring.    At this time I recommend we increase her PPI to twice daily, patient will start omeprazole 40 mg 30 minutes before breakfast and 30 minutes before dinner.  She will continue famotidine 40 mg once daily at bedtime in addition to this  Will order EGD with biopsy to r/o esophagitis/ gastritis/ H pylori/ PUD. I educated patient on GERD diet and lifestyle including avoidance of trigger foods including fatty, greasy, spicy foods, chocolate, caffeine, alcohol, citrus foods, tomato sauces. We discussed  the importance of eating smaller frequent meals, not eating close to bedtime. I also advised to limit NSAIDs if able.  Handout on GERD and GERD diet was provided in the office today  Given worsening diarrhea and unintentional weight loss we will also plan for colonoscopy with TI intubation and pancolonic biopsies at time of EGD to rule out possible underlying microscopic colitis or inflammatory bowel disease.  Otherwise I recommended patient begin taking Metamucil powder fiber supplement once daily at dinnertime to help bulk and regulate stools.  Continue hyoscyamine as needed    Will consider starting cholestyramine in follow-up pending above results      - EGD; Future  - omeprazole (PriLOSEC) 40 MG capsule; Take 1 capsule (40 mg total) by mouth 2 (two) times a day  Dispense: 180 capsule; Refill: 1  -  Colonoscopy; Future  - polyethylene glycol (GOLYTELY) 4000 mL solution; Take 4,000 mL by mouth once for 1 dose Take as directed by office instructions prior to colonoscopy.  Dispense: 4000 mL; Refill: 0      The risk/benefits/alternatives of the procedure/s were discussed with the patient.  Risks included, but not limited to, infection, bleeding, perforation, injury to organs in the abdomen, missed lesion and incomplete procedure were discussed.  Patient expressed understanding and agreeable to proceed with procedure/s.    Patient was instructed to call the office with any questions, concerns, new/ worsening/ persisting GI symptoms. Advised patient go to the ER with any severe or worsening abdominal pain, fevers/ chills, intractable N/V, chest pain, SOB, dizziness, lightheadedness, feeling something stuck in esophagus that will not go down. Patient expressed understanding and is in agreement with treatment plan.     Will plan to follow up after diagnostic tests   __________________________________________________________    SUBJECTIVE:     Patient with past medical history of migraines, GERD, gastroparesis, history of IBS, SIBO, depression, PTSD known gallstones presents to the office today for follow-up.  Patient was last seen in the office by me 5/3/2024, previous office note was reviewed.  At last office visit I ordered a right upper quadrant ultrasound as well as CBC, CMP, stool studies.  I recommended patient continue with omeprazole 40 mg once daily.  CBC and CMP were completed 5/3/2024, these were reviewed, these were normal aside from slight leukocytosis.  Stool studies were completed 5/3/2024, these were reviewed and negative for infection  Right upper quadrant ultrasound was completed 5/3/2024, this was reviewed, this showed evidence of gallstones.   Patient went on to undergo laparoscopic cholecystectomy on 6/17/2024  Unfortunately, since last office visit, patient was seen in the ER 9/29/2024  with  complaints of epigastric abdominal pain.  ER note reviewed.  She underwent laboratory testing which was normal including normal CBC, CMP, lipase.  CT abdomen and pelvis with IV contrast was completed and this was also normal.    Patient daughter and granddaughter is present for visit today.   Patient reports ongoing epigastric abdominal x several months.   She reports little to no improvement status post cholecystectomy.  Abdominal pain is postprandial.  She tells me she is scared to eat.  She describes it as burning.  And there is associated nausea, heartburn, acid reflux.  Symptoms persist despite taking omeprazole 40 mg once daily and famotidine 40 mg once daily at bedtime  She also describes worsening diarrhea for the last few months since cholecystectomy.  She can also have periods of constipation however.  She has had rare nocturnal stools.  She has used hyoscyamine with some relief of abdominal pain  Patient did lose over 10 pounds since last office visit. She is not trying to lose weight.   Patient denies any fevers/ chills, vomiting, black or bloody stools, dysphagia, odynophagia.   Denies chest pain, SOB      Tobacco use- None  Alcohol use- None  NSAID use- None  Patient denies first-degree relative with colon cancer, inflammatory bowel disease, celiac disease       Review of Systems   Constitutional:  Positive for unexpected weight change. Negative for chills and fever.   HENT:  Negative for ear pain and sore throat.    Eyes:  Negative for pain and visual disturbance.   Respiratory:  Negative for cough and shortness of breath.    Cardiovascular:  Negative for chest pain and palpitations.   Gastrointestinal:  Positive for abdominal pain, diarrhea and nausea. Negative for vomiting.   Genitourinary:  Negative for dysuria and hematuria.   Musculoskeletal:  Negative for arthralgias and back pain.   Skin:  Negative for color change and rash.   Neurological:  Negative for seizures and syncope.   All other  systems reviewed and are negative.         Historical Information   Past Medical History:   Diagnosis Date    Anxiety     Calculus of gallbladder without cholecystitis without obstruction 05/22/2024    Depression     GERD (gastroesophageal reflux disease)     Migraine     takes excederine migraine     Past Surgical History:   Procedure Laterality Date    DENTAL SURGERY      ENDOMETRIAL ABLATION N/A 4/1/2022    Procedure: ABLATION ENDOMETRIAL NOVASURE;  Surgeon: Chino Ortiz MD;  Location: [facility];  Service: Gynecology    IN HYSTEROSCOPY BX ENDOMETRIUM&/POLYPC W/WO D&C N/A 4/1/2022    Procedure: DILATATION AND CURETTAGE (D&C) WITH HYSTEROSCOPY;  Surgeon: Chino Ortiz MD;  Location: [facility];  Service: Gynecology    IN LAPAROSCOPY SURG CHOLECYSTECTOMY N/A 6/17/2024    Procedure: CHOLECYSTECTOMY LAP;  Surgeon: Jose Hurd MD;  Location: [facility];  Service: General    IN REMOVAL INTRAUTERINE DEVICE IUD N/A 4/1/2022    Procedure: REMOVAL OF INTRAUTERINE DEVICE (IUD);  Surgeon: Chino Ortiz MD;  Location: [facility];  Service: Gynecology     Social History   Social History     Substance and Sexual Activity   Alcohol Use Not Currently    Comment: social     Social History     Substance and Sexual Activity   Drug Use No     Social History     Tobacco Use   Smoking Status Never   Smokeless Tobacco Never     Family History   Problem Relation Age of Onset    Breast cancer Mother         63 y/o     BRCA1 Negative Mother     Lung cancer Mother 66    Diabetes Father     Hypertension Father     No Known Problems Daughter     Breast cancer Maternal Grandmother 52    Lung cancer Maternal Grandmother 64    Alzheimer's disease Maternal Grandfather     Heart disease Paternal Grandmother     No Known Problems Paternal Grandfather     No Known Problems Brother     No Known Problems Brother     No Known Problems Brother     No Known Problems Son     No Known Problems Son     No Known Problems Maternal Aunt     No Known  Problems Maternal Aunt     No Known Problems Maternal Aunt     No Known Problems Maternal Aunt     Lung cancer Maternal Uncle 50    No Known Problems Paternal Aunt     No Known Problems Paternal Aunt     No Known Problems Paternal Aunt     No Known Problems Paternal Aunt     Breast cancer Cousin         age dx unknown    Throat cancer Cousin 41    BRCA1 Positive Cousin     Kidney cancer Cousin 41       Meds/Allergies       Current Outpatient Medications:     benzonatate (TESSALON PERLES) 100 mg capsule    Cholecalciferol (Vitamin D) 50 MCG (2000 UT) tablet    EPINEPHrine (EPIPEN) 0.3 mg/0.3 mL SOAJ    ergocalciferol (VITAMIN D2) 50,000 units    escitalopram (LEXAPRO) 10 mg tablet    famotidine (PEPCID) 40 MG tablet    hyoscyamine (ANASPAZ,LEVSIN) 0.125 MG tablet    ibuprofen (MOTRIN) 600 mg tablet    loratadine (CLARITIN) 10 mg tablet    medroxyPROGESTERone (DEPO-PROVERA) 150 mg/mL injection    omeprazole (PriLOSEC) 40 MG capsule    ondansetron (ZOFRAN) 4 mg tablet    tiZANidine (ZANAFLEX) 4 mg tablet    Current Facility-Administered Medications:     medroxyPROGESTERone (DEPO-PROVERA) IM injection 150 mg, 150 mg, Intramuscular, Q3 Months, 150 mg at 09/12/24 1118    Allergies   Allergen Reactions    Bee Venom Anaphylaxis    Other Swelling     Patient states that she gets hives and swells when she comes in contact with clorox or bleach products.           Objective     Wt Readings from Last 3 Encounters:   07/02/24 69.7 kg (153 lb 9.6 oz)   06/17/24 70.4 kg (155 lb 3.3 oz)   05/24/24 72 kg (158 lb 12.8 oz)     Temp Readings from Last 3 Encounters:   09/29/24 97.6 °F (36.4 °C) (Temporal)   06/17/24 98.3 °F (36.8 °C) (Temporal)   05/24/24 98.5 °F (36.9 °C) (Temporal)     BP Readings from Last 3 Encounters:   09/29/24 107/57   07/02/24 103/71   06/17/24 109/72     Pulse Readings from Last 3 Encounters:   09/29/24 66   07/02/24 86   06/17/24 85          PHYSICAL EXAM:      Physical Exam  Vitals reviewed.   Constitutional:        General: She is not in acute distress.     Appearance: She is obese. She is not ill-appearing, toxic-appearing or diaphoretic.   HENT:      Head: Normocephalic and atraumatic.   Eyes:      Extraocular Movements: Extraocular movements intact.      Conjunctiva/sclera: Conjunctivae normal.   Cardiovascular:      Rate and Rhythm: Normal rate and regular rhythm.   Pulmonary:      Effort: Pulmonary effort is normal. No respiratory distress.      Breath sounds: Normal breath sounds.   Abdominal:      General: Bowel sounds are normal.      Palpations: Abdomen is soft.      Tenderness: There is abdominal tenderness in the epigastric area.   Musculoskeletal:         General: No swelling or tenderness.      Cervical back: Normal range of motion and neck supple.   Skin:     General: Skin is warm and dry.      Coloration: Skin is not jaundiced.   Neurological:      General: No focal deficit present.      Mental Status: She is alert and oriented to person, place, and time. Mental status is at baseline.   Psychiatric:         Mood and Affect: Mood normal.         Behavior: Behavior normal.         Thought Content: Thought content normal.        Lab Results:   Lab Results   Component Value Date    WBC 8.03 09/29/2024    HGB 13.9 09/29/2024    HCT 41.3 09/29/2024    MCV 91 09/29/2024     09/29/2024       Lab Results   Component Value Date     08/23/2015    SODIUM 140 09/29/2024    K 3.2 (L) 09/29/2024     09/29/2024    CO2 23 09/29/2024    ANIONGAP 8 08/23/2015    AGAP 10 09/29/2024    BUN 14 09/29/2024    CREATININE 0.66 09/29/2024    GLUC 147 (H) 09/29/2024    GLUF 95 04/26/2024    CALCIUM 8.9 09/29/2024    AST 15 09/29/2024    ALT 13 09/29/2024    ALKPHOS 80 09/29/2024    PROT 6.9 08/23/2015    TP 7.2 09/29/2024    BILITOT 0.13 (L) 08/23/2015    TBILI 0.56 09/29/2024    EGFR 104 09/29/2024       Lab Results   Component Value Date    ZNY7IGHNEVXR 0.874 03/02/2023     Radiology Results:   CT abdomen pelvis  with contrast    Result Date: 9/29/2024  Narrative: CT ABDOMEN AND PELVIS WITH IV CONTRAST INDICATION: epigastric abd pain, nausea COMPARISON: Abdominal ultrasound from 5/3/2024, and abdomen pelvic CT from 9/1/2021 TECHNIQUE: CT examination of the abdomen and pelvis was performed. Multiplanar 2D reformatted images were created from the source data. This examination, like all CT scans performed in the [facility], was performed utilizing techniques to minimize radiation dose exposure, including the use of iterative reconstruction and automated exposure control. Radiation dose length product (DLP) for this visit: 385 mGy-cm IV Contrast: 85 mL of iohexol (OMNIPAQUE) Enteric Contrast: Not administered. FINDINGS: ABDOMEN LOWER CHEST: No clinically significant abnormality in the visualized lower chest. LIVER/BILIARY TREE: Focal fat adjacent to the falciform ligament, otherwise unremarkable abdomen. GALLBLADDER: Post cholecystectomy. SPLEEN: Unremarkable. PANCREAS: Unremarkable. ADRENAL GLANDS: Unremarkable. KIDNEYS/URETERS: Unremarkable. No hydronephrosis. STOMACH AND BOWEL: Unremarkable. APPENDIX: Normal. ABDOMINOPELVIC CAVITY: No ascites. No pneumoperitoneum. No lymphadenopathy. VESSELS: Unremarkable for patient's age. PELVIS REPRODUCTIVE ORGANS: Unremarkable for patient's age. URINARY BLADDER: Unremarkable. ABDOMINAL WALL/INGUINAL REGIONS: Unremarkable. BONES: No acute fracture or suspicious osseous lesion.     Impression: No source for pain identified in the abdomen or pelvis. Workstation performed: [id number]     Patient underwent nuclear medicine gastric emptying scan 6/27/2023, this was reviewed, this showed a delayed rate of gastric emptying consistent with gastroparesis.     Patient underwent ultrasound pelvis complete with transvaginal 3/6/2023, this showed a uterine fibroid, otherwise grossly unremarkable.     Patient underwent breath test which was positive for both SIBO and IMO and she  completed a 14-day course of Xifaxan and neomycin in the past     She had a RUQ US 9/1/2021 which showed gallstones     Prior Procedure Results/Reports   Patient underwent both EGD and colonoscopy in 2021 at outside hospital in [place], these per reports were reviewed, both EGD and colonoscopy were completely normal. A repeat colonoscopy was recommended to be completed in 10 years.     Karina Hinojosa PA-C

## 2024-10-16 ENCOUNTER — TELEPHONE (OUTPATIENT)
Age: 49
End: 2024-10-16

## 2024-10-17 ENCOUNTER — PATIENT MESSAGE (OUTPATIENT)
Dept: GASTROENTEROLOGY | Facility: CLINIC | Age: 49
End: 2024-10-17

## 2024-10-18 ENCOUNTER — OFFICE VISIT (OUTPATIENT)
Dept: INTERNAL MEDICINE CLINIC | Facility: CLINIC | Age: 49
End: 2024-10-18

## 2024-10-18 VITALS
DIASTOLIC BLOOD PRESSURE: 75 MMHG | WEIGHT: 149 LBS | BODY MASS INDEX: 30.04 KG/M2 | TEMPERATURE: 98 F | SYSTOLIC BLOOD PRESSURE: 111 MMHG | HEIGHT: 59 IN | HEART RATE: 106 BPM

## 2024-10-18 DIAGNOSIS — E55.9 VITAMIN D DEFICIENCY: ICD-10-CM

## 2024-10-18 DIAGNOSIS — Z12.31 OTHER SCREENING MAMMOGRAM: ICD-10-CM

## 2024-10-18 DIAGNOSIS — E87.6 HYPOKALEMIA: ICD-10-CM

## 2024-10-18 DIAGNOSIS — Z23 NEED FOR INFLUENZA VACCINATION: ICD-10-CM

## 2024-10-18 DIAGNOSIS — Z83.438 FAMILY HISTORY OF HYPERLIPIDEMIA: ICD-10-CM

## 2024-10-18 DIAGNOSIS — R92.8 ABNORMAL MAMMOGRAM: ICD-10-CM

## 2024-10-18 DIAGNOSIS — Z83.3 FAMILY HISTORY OF DIABETES MELLITUS IN FATHER: ICD-10-CM

## 2024-10-18 DIAGNOSIS — Z00.00 WELL ADULT EXAM: Primary | ICD-10-CM

## 2024-10-18 NOTE — ASSESSMENT & PLAN NOTE
Pt has completed course of vit D 50 k weekly , recommend follow up Vit D OH   Orders:    Vitamin D 25 hydroxy; Future

## 2024-10-18 NOTE — PROGRESS NOTES
Adult Annual Physical  Name: Jayshree Alcantara      : 1975      MRN: [id number]  Encounter Provider: Antonia Dimas MD  Encounter Date: 10/18/2024   Encounter department: [facility]    Assessment & Plan  Need for influenza vaccination    Orders:    influenza vaccine preservative-free 0.5 mL IM (Fluzone, Afluria, Fluarix, Flulaval)    Vitamin D deficiency  Pt has completed course of vit D 50 k weekly , recommend follow up Vit D OH   Orders:    Vitamin D 25 hydroxy; Future    Family history of hyperlipidemia         Family history of diabetes mellitus in father         Abnormal mammogram         Well adult exam  Pt is agreeable to flu vaccine today , mammogram ordered        Other screening mammogram    Orders:    Mammo screening bilateral w 3d and cad; Future    Hypokalemia  When in the hospital gall bladder issue , K was low , recommend follow up BMP  Orders:    Basic metabolic panel; Future    Immunizations and preventive care screenings were discussed with patient today. Appropriate education was printed on patient's after visit summary.    Counseling:  Alcohol/drug use: discussed moderation in alcohol intake, the recommendations for healthy alcohol use, and avoidance of illicit drug use.  Dental Health: discussed importance of regular tooth brushing, flossing, and dental visits.  Exercise: the importance of regular exercise/physical activity was discussed. Recommend exercise 3-5 times per week for at least 30 minutes.          History of Present Illness     Adult Annual Physical   Vision last year   Dental this year   Hearing normal   Diet has not been doing as well her diet had vince 24  + epigastric pain and loose stools trying to do best she can with low fat bland chicken , no red meat no pork , ok with fruits and veggies , water 1 bottle , coffee 1 c , some juice     Exercise does walk daily   - Smoking  rare ETOH   Fam hx DM Dad , HTN Dad and Mom MGF CHF  "  Fam hx cancer Mom breast ca, MGM uterine ca , M Cousin throat cancer   Review of Systems   Constitutional:  Negative for chills and fever.   HENT:  Negative for ear pain and sore throat.    Eyes:  Negative for pain and visual disturbance.   Respiratory:  Negative for cough and shortness of breath.    Cardiovascular:  Negative for chest pain and palpitations.   Gastrointestinal:  Positive for abdominal pain and diarrhea. Negative for vomiting.        Chronic since cholecystectomy   Genitourinary:  Negative for dysuria and hematuria.   Musculoskeletal:  Negative for arthralgias and back pain.   Skin:  Negative for color change and rash.   Neurological:  Negative for seizures and syncope.   All other systems reviewed and are negative.        Objective     /75 (BP Location: Right arm, Patient Position: Sitting, Cuff Size: Large)   Pulse (!) 106   Temp 98 °F (36.7 °C) (Temporal)   Ht 4' 11\" (1.499 m)   Wt 67.6 kg (149 lb)   LMP  (LMP Unknown)   BMI 30.09 kg/m²     Physical Exam  Vitals and nursing note reviewed.   Constitutional:       General: She is not in acute distress.     Appearance: She is well-developed.      Comments: Skin with good color turgor , well hydrated ,no distress noted     HENT:      Head: Normocephalic and atraumatic.      Right Ear: No decreased hearing noted. No middle ear effusion. There is no impacted cerumen.      Left Ear: No decreased hearing noted.  No middle ear effusion. There is no impacted cerumen.      Mouth/Throat:      Pharynx: Oropharynx is clear.   Eyes:      Conjunctiva/sclera: Conjunctivae normal.   Neck:      Thyroid: No thyromegaly.   Cardiovascular:      Rate and Rhythm: Normal rate and regular rhythm.      Heart sounds: Normal heart sounds. No murmur heard.  Pulmonary:      Effort: Pulmonary effort is normal. No respiratory distress.      Breath sounds: Normal breath sounds.   Abdominal:      Palpations: Abdomen is soft.      Tenderness: There is no abdominal " tenderness.   Musculoskeletal:         General: No swelling.      Cervical back: Neck supple.   Lymphadenopathy:      Cervical:      Right cervical: No superficial cervical adenopathy.     Left cervical: No superficial or deep cervical adenopathy.   Skin:     General: Skin is warm and dry.      Capillary Refill: Capillary refill takes less than 2 seconds.   Neurological:      Mental Status: She is alert.      Comments: Non focal exam   Psychiatric:         Attention and Perception: Attention normal.         Mood and Affect: Mood normal.         Speech: Speech normal.         Behavior: Behavior normal.

## 2024-10-18 NOTE — ASSESSMENT & PLAN NOTE
When in the hospital gall bladder issue , K was low , recommend follow up BMP  Orders:    Basic metabolic panel; Future

## 2024-11-07 RX ORDER — SODIUM CHLORIDE, SODIUM LACTATE, POTASSIUM CHLORIDE, CALCIUM CHLORIDE 600; 310; 30; 20 MG/100ML; MG/100ML; MG/100ML; MG/100ML
50 INJECTION, SOLUTION INTRAVENOUS CONTINUOUS
Status: CANCELLED | OUTPATIENT
Start: 2024-11-07

## 2024-11-08 ENCOUNTER — ANESTHESIA (OUTPATIENT)
Dept: GASTROENTEROLOGY | Facility: HOSPITAL | Age: 49
End: 2024-11-08

## 2024-11-08 ENCOUNTER — HOSPITAL ENCOUNTER (OUTPATIENT)
Dept: GASTROENTEROLOGY | Facility: HOSPITAL | Age: 49
Setting detail: OUTPATIENT SURGERY
End: 2024-11-08

## 2024-11-08 ENCOUNTER — ANESTHESIA EVENT (OUTPATIENT)
Dept: GASTROENTEROLOGY | Facility: HOSPITAL | Age: 49
End: 2024-11-08

## 2024-11-08 ENCOUNTER — TELEPHONE (OUTPATIENT)
Age: 49
End: 2024-11-08

## 2024-11-08 VITALS
BODY MASS INDEX: 29.43 KG/M2 | SYSTOLIC BLOOD PRESSURE: 112 MMHG | HEIGHT: 59 IN | TEMPERATURE: 97.6 F | DIASTOLIC BLOOD PRESSURE: 64 MMHG | WEIGHT: 146 LBS | HEART RATE: 68 BPM | OXYGEN SATURATION: 100 % | RESPIRATION RATE: 16 BRPM

## 2024-11-08 DIAGNOSIS — R19.7 DIARRHEA, UNSPECIFIED TYPE: ICD-10-CM

## 2024-11-08 DIAGNOSIS — R10.13 DYSPEPSIA: ICD-10-CM

## 2024-11-08 DIAGNOSIS — R19.4 CHANGE IN BOWEL HABIT: ICD-10-CM

## 2024-11-08 DIAGNOSIS — K21.9 GASTROESOPHAGEAL REFLUX DISEASE, UNSPECIFIED WHETHER ESOPHAGITIS PRESENT: ICD-10-CM

## 2024-11-08 DIAGNOSIS — R63.4 WEIGHT LOSS, UNINTENTIONAL: ICD-10-CM

## 2024-11-08 DIAGNOSIS — R10.13 EPIGASTRIC PAIN: ICD-10-CM

## 2024-11-08 PROCEDURE — 88305 TISSUE EXAM BY PATHOLOGIST: CPT | Performed by: PATHOLOGY

## 2024-11-08 PROCEDURE — 45380 COLONOSCOPY AND BIOPSY: CPT | Performed by: INTERNAL MEDICINE

## 2024-11-08 PROCEDURE — 43239 EGD BIOPSY SINGLE/MULTIPLE: CPT | Performed by: INTERNAL MEDICINE

## 2024-11-08 RX ORDER — HYOSCYAMINE SULFATE 0.125 MG
125 TABLET ORAL EVERY 6 HOURS PRN
Qty: 90 TABLET | Refills: 1 | Status: CANCELLED | OUTPATIENT
Start: 2024-11-08

## 2024-11-08 RX ORDER — PROPOFOL 10 MG/ML
INJECTION, EMULSION INTRAVENOUS CONTINUOUS PRN
Status: DISCONTINUED | OUTPATIENT
Start: 2024-11-08 | End: 2024-11-08

## 2024-11-08 RX ORDER — SODIUM CHLORIDE, SODIUM LACTATE, POTASSIUM CHLORIDE, CALCIUM CHLORIDE 600; 310; 30; 20 MG/100ML; MG/100ML; MG/100ML; MG/100ML
INJECTION, SOLUTION INTRAVENOUS CONTINUOUS PRN
Status: DISCONTINUED | OUTPATIENT
Start: 2024-11-08 | End: 2024-11-08

## 2024-11-08 RX ORDER — PROPOFOL 10 MG/ML
INJECTION, EMULSION INTRAVENOUS AS NEEDED
Status: DISCONTINUED | OUTPATIENT
Start: 2024-11-08 | End: 2024-11-08

## 2024-11-08 RX ORDER — HYOSCYAMINE SULFATE 0.125 MG
125 TABLET ORAL EVERY 6 HOURS PRN
Qty: 90 TABLET | Refills: 1 | Status: SHIPPED | OUTPATIENT
Start: 2024-11-08

## 2024-11-08 RX ORDER — MONTELUKAST SODIUM 4 MG/1
1 TABLET, CHEWABLE ORAL
Qty: 90 TABLET | Refills: 2 | Status: SHIPPED | OUTPATIENT
Start: 2024-11-08 | End: 2025-02-06

## 2024-11-08 RX ORDER — LIDOCAINE HYDROCHLORIDE 10 MG/ML
INJECTION, SOLUTION EPIDURAL; INFILTRATION; INTRACAUDAL; PERINEURAL AS NEEDED
Status: DISCONTINUED | OUTPATIENT
Start: 2024-11-08 | End: 2024-11-08

## 2024-11-08 RX ADMIN — SODIUM CHLORIDE, SODIUM LACTATE, POTASSIUM CHLORIDE, AND CALCIUM CHLORIDE: .6; .31; .03; .02 INJECTION, SOLUTION INTRAVENOUS at 09:52

## 2024-11-08 RX ADMIN — SODIUM CHLORIDE, SODIUM LACTATE, POTASSIUM CHLORIDE, AND CALCIUM CHLORIDE: .6; .31; .03; .02 INJECTION, SOLUTION INTRAVENOUS at 10:22

## 2024-11-08 RX ADMIN — PROPOFOL 60 MG: 10 INJECTION, EMULSION INTRAVENOUS at 09:54

## 2024-11-08 RX ADMIN — PROPOFOL 120 MCG/KG/MIN: 10 INJECTION, EMULSION INTRAVENOUS at 09:54

## 2024-11-08 RX ADMIN — PROPOFOL 40 MG: 10 INJECTION, EMULSION INTRAVENOUS at 09:57

## 2024-11-08 RX ADMIN — LIDOCAINE HYDROCHLORIDE 50 MG: 10 SOLUTION INTRAVENOUS at 09:54

## 2024-11-08 NOTE — ANESTHESIA POSTPROCEDURE EVALUATION
Post-Op Assessment Note    CV Status:  Stable    Pain management: adequate       Mental Status:  Alert and sleepy   Hydration Status:  Euvolemic   PONV Controlled:  Controlled   Airway Patency:  Patent     Post Op Vitals Reviewed: Yes    No anethesia notable event occurred.    Staff: CRNA           Last Filed PACU Vitals:  Vitals Value Taken Time   Temp 97.6 °F (36.4 °C) 11/08/24 1037   Pulse 81 11/08/24 1037   /74 11/08/24 1037   Resp 16 11/08/24 1037   SpO2 100 % 11/08/24 1037       Modified Kristofer:  Activity: 2 (11/8/2024  9:41 AM)  Respiration: 2 (11/8/2024  9:41 AM)  Circulation: 2 (11/8/2024  9:41 AM)  Consciousness: 2 (11/8/2024  9:41 AM)  Oxygen Saturation: 2 (11/8/2024  9:41 AM)  Modified Kristofer Score: 10 (11/8/2024  9:41 AM)

## 2024-11-08 NOTE — INTERVAL H&P NOTE
H&P reviewed. Patient here for EGD/Colonoscopy for evaluation of epigastric abdominal pain, diarrhea and unintentional weight loss.  After examining the patient I find no changes in the patients condition since the H&P had been written.    Vitals:    11/08/24 0931   BP: 99/65   Pulse: 89   Resp: 14   Temp: (!) 97.4 °F (36.3 °C)   SpO2: 99%

## 2024-11-08 NOTE — TELEPHONE ENCOUNTER
Patients GI provider:  EMMETT Graves    Number to return call: [phone]    Reason for call: Pt calling to request refill on hyoscyamine.    Scheduled procedure/appointment date if applicable: N/A

## 2024-11-08 NOTE — ANESTHESIA PREPROCEDURE EVALUATION
Procedure:  EGD  COLONOSCOPY    Relevant Problems   CARDIO   (+) Migraines      GI/HEPATIC   (+) GERD (gastroesophageal reflux disease)      NEURO/PSYCH   (+) Migraines   (+) Moderate major depression, single episode (HCC)   (+) PTSD (post-traumatic stress disorder)   (+) Paresthesia of hand, bilateral      Digestive   (+) Periodontitis      FEN/Gastrointestinal   (+) Gastroparesis   (+) Irritable bowel syndrome with diarrhea        Physical Exam    Airway    Mallampati score: II  TM Distance: >3 FB  Neck ROM: full     Dental   No notable dental hx implants    Cardiovascular  Cardiovascular exam normal    Pulmonary  Pulmonary exam normal     Other Findings  post-pubertal.      Anesthesia Plan  ASA Score- 2     Anesthesia Type- IV sedation with anesthesia with ASA Monitors.         Additional Monitors:     Airway Plan:            Plan Factors-Exercise tolerance (METS): >4 METS.    Chart reviewed.   Existing labs reviewed. Patient summary reviewed.    Patient is not a current smoker. Patient not instructed to abstain from smoking on day of procedure. Patient did not smoke on day of surgery.            Induction-     Postoperative Plan-     Perioperative Resuscitation Plan - Level 1 - Full Code.       Informed Consent- Anesthetic plan and risks discussed with patient.  I personally reviewed this patient with the CRNA. Discussed and agreed on the Anesthesia Plan with the CRNA..      Lab Results   Component Value Date    HGBA1C 5.2 01/08/2021       Lab Results   Component Value Date     08/23/2015    K 3.2 (L) 09/29/2024     09/29/2024    CO2 23 09/29/2024    ANIONGAP 8 08/23/2015    BUN 14 09/29/2024    CREATININE 0.66 09/29/2024    GLUCOSE 114 08/23/2015    GLUF 95 04/26/2024    CALCIUM 8.9 09/29/2024    AST 15 09/29/2024    ALT 13 09/29/2024    ALKPHOS 80 09/29/2024    PROT 6.9 08/23/2015    BILITOT 0.13 (L) 08/23/2015    EGFR 104 09/29/2024       Lab Results   Component Value Date    WBC 8.03 09/29/2024     HGB 13.9 09/29/2024    HCT 41.3 09/29/2024    MCV 91 09/29/2024     09/29/2024     Normal sinus rhythm  Nonspecific T wave abnormality  Abnormal ECG  When compared with ECG of 30-JUN-2023 13:23,  Inverted T waves have replaced nonspecific T wave abnormality in Inferior leads  Nonspecific T wave abnormality now evident in Lateral leads  Confirmed by German Whitaker ([id number]) on 9/30/2024 9:51:05 PM      Specimen Collected: 09/29/24 11:45

## 2024-11-12 PROCEDURE — 88305 TISSUE EXAM BY PATHOLOGIST: CPT | Performed by: PATHOLOGY

## 2024-11-17 PROBLEM — Z00.00 WELL ADULT EXAM: Status: RESOLVED | Noted: 2021-01-07 | Resolved: 2024-11-17

## 2024-11-25 ENCOUNTER — RESULTS FOLLOW-UP (OUTPATIENT)
Dept: GASTROENTEROLOGY | Facility: CLINIC | Age: 49
End: 2024-11-25

## 2024-11-30 DIAGNOSIS — R19.7 DIARRHEA, UNSPECIFIED TYPE: ICD-10-CM

## 2024-12-02 ENCOUNTER — ANNUAL EXAM (OUTPATIENT)
Dept: OBGYN CLINIC | Facility: CLINIC | Age: 49
End: 2024-12-02

## 2024-12-02 VITALS
SYSTOLIC BLOOD PRESSURE: 124 MMHG | BODY MASS INDEX: 28.71 KG/M2 | HEIGHT: 59 IN | WEIGHT: 142.4 LBS | DIASTOLIC BLOOD PRESSURE: 70 MMHG | HEART RATE: 84 BPM

## 2024-12-02 DIAGNOSIS — Z12.4 CERVICAL CANCER SCREENING: ICD-10-CM

## 2024-12-02 DIAGNOSIS — Z30.013 ENCOUNTER FOR INITIAL PRESCRIPTION OF INJECTABLE CONTRACEPTIVE: ICD-10-CM

## 2024-12-02 DIAGNOSIS — Z01.419 WOMEN'S ANNUAL ROUTINE GYNECOLOGICAL EXAMINATION: Primary | ICD-10-CM

## 2024-12-02 DIAGNOSIS — Z12.31 ENCOUNTER FOR SCREENING MAMMOGRAM FOR MALIGNANT NEOPLASM OF BREAST: ICD-10-CM

## 2024-12-02 PROCEDURE — 99396 PREV VISIT EST AGE 40-64: CPT | Performed by: NURSE PRACTITIONER

## 2024-12-02 RX ORDER — MONTELUKAST SODIUM 4 MG/1
TABLET, CHEWABLE ORAL
Qty: 270 TABLET | Refills: 1 | Status: SHIPPED | OUTPATIENT
Start: 2024-12-02

## 2024-12-02 RX ORDER — MEDROXYPROGESTERONE ACETATE 150 MG/ML
150 INJECTION, SUSPENSION INTRAMUSCULAR
Qty: 1 ML | Refills: 3 | Status: SHIPPED | OUTPATIENT
Start: 2024-12-02

## 2024-12-02 NOTE — PROGRESS NOTES
ANNUAL GYNECOLOGICAL EXAMINATION    Jayshree Alcantara is a 49 y.o. female who presents today for annual GYN exam.  Her last pap smear was performed  and result was NILM, HR-HPV negative.  She reports no history of abnormal pap smears in her past.  Her last mammogram was performed 2023 and result was BIRADS1.  She had colonoscopy screening performed  with recommendation to repeat in 10 years.  She had HIV screening performed  and it was negative.  She reports menses as absent since uterine ablation & starting depo-provera in , both for treatment of AUB.  No LMP recorded (lmp unknown). Patient has had an injection.  Her general medical history has been reviewed and she reports it as follows:    Past Medical History:   Diagnosis Date    Anxiety     Calculus of gallbladder without cholecystitis without obstruction 2024    Depression     GERD (gastroesophageal reflux disease)     Migraine     takes excederine migraine     Past Surgical History:   Procedure Laterality Date    DENTAL SURGERY      ENDOMETRIAL ABLATION N/A 2022    Procedure: ABLATION ENDOMETRIAL NOVASURE;  Surgeon: Chino Ortiz MD;  [facility];  Service: Gynecology    CT HYSTEROSCOPY BX ENDOMETRIUM&/POLYPC W/WO D&C N/A 2022    Procedure: DILATATION AND CURETTAGE (D&C) WITH HYSTEROSCOPY;  Surgeon: Chino Ortiz MD;  [facility];  Service: Gynecology    CT LAPAROSCOPY SURG CHOLECYSTECTOMY N/A 2024    Procedure: CHOLECYSTECTOMY LAP;  Surgeon: Jsoe Hurd MD;  [facility];  Service: General    CT REMOVAL INTRAUTERINE DEVICE IUD N/A 2022    Procedure: REMOVAL OF INTRAUTERINE DEVICE (IUD);  Surgeon: Chino Ortiz MD;  [facility];  Service: Gynecology     OB History          5    Para   3    Term   3            AB   2    Living   3         SAB   2    IAB        Ectopic        Multiple   0    Live Births   3               Social History     Tobacco Use    Smoking  "status: Never    Smokeless tobacco: Never   Vaping Use    Vaping status: Never Used   Substance Use Topics    Alcohol use: Not Currently     Comment: social    Drug use: No     Social History     Substance and Sexual Activity   Sexual Activity Yes    Partners: Male    Birth control/protection: Injection     Cancer-related family history includes Breast cancer in her cousin and mother; Breast cancer (age of onset: 52) in her maternal grandmother; Kidney cancer (age of onset: 41) in her cousin; Lung cancer (age of onset: 50) in her maternal uncle; Lung cancer (age of onset: 64) in her maternal grandmother; Lung cancer (age of onset: 66) in her mother.    Current Outpatient Medications   Medication Instructions    colestipol (COLESTID) 1 g tablet PLEASE SEE ATTACHED FOR DETAILED DIRECTIONS    EPINEPHrine (EPIPEN) 0.3 mg, Intramuscular, Once as needed    escitalopram (LEXAPRO) 10 mg, Oral, Daily    famotidine (PEPCID) 40 mg, Oral, Daily at bedtime,       hyoscyamine (ANASPAZ,LEVSIN) 125 mcg, Oral, Every 6 hours PRN    ibuprofen (MOTRIN) 600 mg, Oral, Every 6 hours PRN    medroxyPROGESTERone (DEPO-PROVERA) 150 mg, Intramuscular, Every 3 months    omeprazole (PRILOSEC) 40 mg, Oral, 2 times daily    ondansetron (ZOFRAN) 4 mg, Oral, Every 6 hours       Review of Systems:  Review of Systems   Constitutional: Negative.    Gastrointestinal: Negative.    Genitourinary: Negative.    Skin: Negative.        Physical Exam:  /70 (BP Location: Left arm, Patient Position: Sitting)   Pulse 84   Ht 4' 11\" (1.499 m)   Wt 64.6 kg (142 lb 6.4 oz)   LMP  (LMP Unknown)   BMI 28.76 kg/m²   Physical Exam  Constitutional:       General: She is not in acute distress.     Appearance: Normal appearance.   Genitourinary:      Vulva and bladder normal.      No lesions in the vagina.      No vaginal erythema or ulceration.        Right Adnexa: not tender and no mass present.     Left Adnexa: not tender and no mass present.     No cervical " motion tenderness or lesion.      Uterus is not enlarged or tender.      No uterine mass detected.  Breasts:     Right: No mass, nipple discharge or skin change.      Left: No mass, nipple discharge or skin change.   Cardiovascular:      Rate and Rhythm: Normal rate and regular rhythm.   Pulmonary:      Effort: Pulmonary effort is normal.      Breath sounds: Normal breath sounds.   Abdominal:      General: Abdomen is flat.      Palpations: Abdomen is soft.   Musculoskeletal:      Cervical back: Neck supple.   Neurological:      Mental Status: She is alert.   Skin:     General: Skin is warm and dry.   Psychiatric:         Mood and Affect: Mood normal.         Behavior: Behavior normal.   Vitals reviewed.         Assessment/Plan:   1. Normal well-woman GYN exam.  2. Cervical cancer screening:  Normal cervical exam.  Pap smear up to date.    3. STD screening:  Patient declines.    4. Breast cancer screening:  Normal breast exam.  Order placed for bilateral screening mammogram.  Reviewed breast self-awareness.   5. Colon cancer screening:  Up to date.   6. Depression Screening: Patient's depression screening was assessed with a PHQ-2 score of 0. Clinically patient does not have depression. No treatment is required.     7. BMI Counseling: Body mass index is 28.76 kg/m². Discussed the patient's BMI with her. The BMI is above normal. Exercise recommendations include exercising 3-5 times per week.   8. Contraception:  Doing well on Depo-provera. Refills approved.    9. Return to office in one year for annual exam.    Reviewed with patient that [facility], but that they will not necessarily be reviewed by me immediately.  Explained that I will review results at my earliest opportunity and contact patient appropriately.

## 2024-12-03 ENCOUNTER — CLINICAL SUPPORT (OUTPATIENT)
Dept: OBGYN CLINIC | Facility: CLINIC | Age: 49
End: 2024-12-03

## 2024-12-03 DIAGNOSIS — Z30.42 ENCOUNTER FOR MANAGEMENT AND INJECTION OF DEPO-PROVERA: Primary | ICD-10-CM

## 2024-12-03 PROCEDURE — 96372 THER/PROPH/DIAG INJ SC/IM: CPT

## 2024-12-03 RX ADMIN — MEDROXYPROGESTERONE ACETATE 150 MG: 150 INJECTION, SUSPENSION INTRAMUSCULAR at 16:07

## 2024-12-03 NOTE — PROGRESS NOTES
Depo-Provera     [x]   Patient provided box yes   0 Refills remain  Refills submitted no  Last  Annual Date / Birth control check : 12/03/24  Last Depo date: 09/12/24  Side effects: no  HCG: no  if applicable:  Given by: Laurymar Reyes  Site: right deltoid    Calcium supplement daily teaching  Condoms for 2 weeks following first injection dose.

## 2024-12-19 DIAGNOSIS — K21.9 GASTROESOPHAGEAL REFLUX DISEASE WITHOUT ESOPHAGITIS: ICD-10-CM

## 2024-12-19 RX ORDER — FAMOTIDINE 40 MG/1
40 TABLET, FILM COATED ORAL
Qty: 90 TABLET | Refills: 1 | Status: SHIPPED | OUTPATIENT
Start: 2024-12-19

## 2025-01-23 ENCOUNTER — OFFICE VISIT (OUTPATIENT)
Dept: INTERNAL MEDICINE CLINIC | Facility: CLINIC | Age: 50
End: 2025-01-23

## 2025-01-23 VITALS
WEIGHT: 145 LBS | BODY MASS INDEX: 29.29 KG/M2 | SYSTOLIC BLOOD PRESSURE: 100 MMHG | DIASTOLIC BLOOD PRESSURE: 63 MMHG | HEART RATE: 96 BPM

## 2025-01-23 DIAGNOSIS — B35.4 TINEA CORPORIS: Primary | ICD-10-CM

## 2025-01-23 PROCEDURE — 99213 OFFICE O/P EST LOW 20 MIN: CPT | Performed by: FAMILY MEDICINE

## 2025-01-23 RX ORDER — CLOTRIMAZOLE 1 %
CREAM (GRAM) TOPICAL 2 TIMES DAILY
Qty: 42 G | Refills: 0 | Status: SHIPPED | OUTPATIENT
Start: 2025-01-23

## 2025-01-23 NOTE — PROGRESS NOTES
Name: Jayshree Alcantara      : 1975      MRN: [id number]  Encounter Provider: Antonia Dimas MD  Encounter Date: 2025   [facility]    Assessment & Plan  Tinea corporis  Local care , ok for her usual soaps , lotion , start lotrimin cream lightly bid x 7- 10 days return if needed   Orders:    clotrimazole (LOTRIMIN) 1 % cream; Apply topically 2 (two) times a day Apply bid to affected area x 7- 10 days    BMI Counseling: Body mass index is 29.29 kg/m². The BMI is above normal. Nutrition recommendations include decreasing portion sizes, encouraging healthy choices of fruits and vegetables, decreasing fast food intake, consuming healthier snacks, limiting drinks that contain sugar and reducing intake of saturated and trans fat. Exercise recommendations include exercising 3-5 times per week. Rationale for BMI follow-up plan is due to patient being overweight or obese.         History of Present Illness     Rash  Pertinent negatives include no cough, fever, shortness of breath, sore throat or vomiting.    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   HENT:  Negative for ear pain and sore throat.    Eyes:  Negative for pain and visual disturbance.   Respiratory:  Negative for cough and shortness of breath.    Cardiovascular:  Negative for chest pain and palpitations.   Gastrointestinal:  Negative for abdominal pain and vomiting.   Genitourinary:  Negative for dysuria and hematuria.   Musculoskeletal:  Negative for arthralgias and back pain.   Skin:  Positive for rash. Negative for color change.   Neurological:  Negative for seizures and syncope.   All other systems reviewed and are  negative.    Past Medical History:   Diagnosis Date    Anxiety     Calculus of gallbladder without cholecystitis without obstruction 05/22/2024    Depression     GERD (gastroesophageal reflux disease)     Migraine     takes excederine migraine     Past Surgical History:   Procedure Laterality Date    DENTAL SURGERY      ENDOMETRIAL ABLATION N/A 4/1/2022    Procedure: ABLATION ENDOMETRIAL NOVASURE;  Surgeon: Chino Ortiz MD;  [facility];  Service: Gynecology    FL HYSTEROSCOPY BX ENDOMETRIUM&/POLYPC W/WO D&C N/A 4/1/2022    Procedure: DILATATION AND CURETTAGE (D&C) WITH HYSTEROSCOPY;  Surgeon: Chino Ortiz MD;  [facility];  Service: Gynecology    FL LAPAROSCOPY SURG CHOLECYSTECTOMY N/A 6/17/2024    Procedure: CHOLECYSTECTOMY LAP;  Surgeon: Jose Hurd MD;  [facility];  Service: General    FL REMOVAL INTRAUTERINE DEVICE IUD N/A 4/1/2022    Procedure: REMOVAL OF INTRAUTERINE DEVICE (IUD);  Surgeon: Chino Ortiz MD;  [facility];  Service: Gynecology     Family History   Problem Relation Age of Onset    Breast cancer Mother         63 y/o     BRCA1 Negative Mother     Lung cancer Mother 66    Diabetes Father     Hypertension Father     No Known Problems Daughter     Breast cancer Maternal Grandmother 52    Lung cancer Maternal Grandmother 64    Alzheimer's disease Maternal Grandfather     Heart disease Paternal Grandmother     No Known Problems Paternal Grandfather     No Known Problems Brother     No Known Problems Brother     No Known Problems Brother     No Known Problems Son     No Known Problems Son     No Known Problems Maternal Aunt     No Known Problems Maternal Aunt     No Known Problems Maternal Aunt     No Known Problems Maternal Aunt     Lung cancer Maternal Uncle 50    No Known Problems Paternal Aunt     No Known Problems Paternal Aunt     No Known Problems Paternal Aunt     No Known Problems Paternal Aunt     Breast cancer Cousin         age dx unknown     Throat cancer Cousin 41    BRCA1 Positive Cousin     Kidney cancer Cousin 41     Social History     Tobacco Use    Smoking status: Never    Smokeless tobacco: Never   Vaping Use    Vaping status: Never Used   Substance and Sexual Activity    Alcohol use: Not Currently     Comment: social    Drug use: No    Sexual activity: Yes     Partners: Male     Birth control/protection: Injection     Current Outpatient Medications on File Prior to Visit   Medication Sig    colestipol (COLESTID) 1 g tablet PLEASE SEE ATTACHED FOR DETAILED DIRECTIONS    EPINEPHrine (EPIPEN) 0.3 mg/0.3 mL SOAJ Inject 0.3 mL (0.3 mg total) into a muscle once as needed for anaphylaxis for up to 1 dose    escitalopram (LEXAPRO) 10 mg tablet Take 1 tablet (10 mg total) by mouth daily (Patient not taking: Reported on 12/2/2024)    famotidine (PEPCID) 40 MG tablet TAKE 1 TABLET BY MOUTH EVERYDAY AT BEDTIME    hyoscyamine (ANASPAZ,LEVSIN) 0.125 MG tablet Take 1 tablet (125 mcg total) by mouth every 6 (six) hours as needed for cramping    ibuprofen (MOTRIN) 600 mg tablet Take 1 tablet (600 mg total) by mouth every 6 (six) hours as needed for mild pain    medroxyPROGESTERone (DEPO-PROVERA) 150 mg/mL injection Inject 1 mL (150 mg total) into a muscle every 3 (three) months    omeprazole (PriLOSEC) 40 MG capsule Take 1 capsule (40 mg total) by mouth 2 (two) times a day    ondansetron (ZOFRAN) 4 mg tablet Take 1 tablet (4 mg total) by mouth every 6 (six) hours     Allergies   Allergen Reactions    Bee Venom Anaphylaxis    Other Swelling     Patient states that she gets hives and swells when she comes in contact with clorox or bleach products.     Immunization History   Administered Date(s) Administered    COVID-19 PFIZER VACCINE 0.3 ML IM 04/29/2021, 05/20/2021    DTaP 11/29/2016    INFLUENZA 11/25/2015, 09/20/2016    Influenza Quadrivalent Preservative Free 3 years and older IM 1976    Influenza, injectable, quadrivalent, preservative free 0.5 mL  10/31/2019, 11/04/2020    Influenza, seasonal, injectable 09/20/2016    Influenza, seasonal, injectable, preservative free 10/18/2024    Tdap 11/29/2016, 06/12/2018     Objective   /63 (BP Location: Left arm, Patient Position: Sitting, Cuff Size: Standard)   Pulse 96   Wt 65.8 kg (145 lb)   BMI 29.29 kg/m²     Physical Exam  Vitals and nursing note reviewed.   Constitutional:       General: She is not in acute distress.     Appearance: She is well-developed.      Comments: Skin with good color turgor , well hydrated ,no distress noted     HENT:      Head: Normocephalic and atraumatic.   Eyes:      Conjunctiva/sclera: Conjunctivae normal.   Cardiovascular:      Rate and Rhythm: Normal rate and regular rhythm.      Heart sounds: No murmur heard.  Pulmonary:      Effort: Pulmonary effort is normal. No respiratory distress.      Breath sounds: Normal breath sounds.   Abdominal:      Palpations: Abdomen is soft.      Tenderness: There is no abdominal tenderness.   Musculoskeletal:         General: No swelling.      Cervical back: Neck supple.   Skin:     General: Skin is warm and dry.      Capillary Refill: Capillary refill takes less than 2 seconds.      Findings: Rash present. No wound. Rash is not pustular or vesicular.      Comments: Medial volar  aspect of R wrist dry erythematous round area no bleeding no pustules or vesicles    Neurological:      Mental Status: She is alert.      Comments: Non focal exam   Psychiatric:         Mood and Affect: Mood normal.

## 2025-01-23 NOTE — ASSESSMENT & PLAN NOTE
Local care , ok for her usual soaps , lotion , start lotrimin cream lightly bid x 7- 10 days return if needed   Orders:    clotrimazole (LOTRIMIN) 1 % cream; Apply topically 2 (two) times a day Apply bid to affected area x 7- 10 days

## 2025-01-24 ENCOUNTER — TELEPHONE (OUTPATIENT)
Facility: HOSPITAL | Age: 50
End: 2025-01-24

## 2025-01-24 ENCOUNTER — OFFICE VISIT (OUTPATIENT)
Dept: GASTROENTEROLOGY | Facility: CLINIC | Age: 50
End: 2025-01-24

## 2025-01-24 ENCOUNTER — HOSPITAL ENCOUNTER (OUTPATIENT)
Dept: RADIOLOGY | Age: 50
Discharge: HOME | End: 2025-01-24

## 2025-01-24 VITALS
DIASTOLIC BLOOD PRESSURE: 72 MMHG | WEIGHT: 143 LBS | BODY MASS INDEX: 28.83 KG/M2 | HEIGHT: 59 IN | TEMPERATURE: 99.1 F | SYSTOLIC BLOOD PRESSURE: 102 MMHG

## 2025-01-24 DIAGNOSIS — K90.89 BILE SALT-INDUCED DIARRHEA: Primary | ICD-10-CM

## 2025-01-24 DIAGNOSIS — K21.9 GASTROESOPHAGEAL REFLUX DISEASE WITHOUT ESOPHAGITIS: ICD-10-CM

## 2025-01-24 DIAGNOSIS — Z12.31 ENCOUNTER FOR SCREENING MAMMOGRAM FOR MALIGNANT NEOPLASM OF BREAST: ICD-10-CM

## 2025-01-24 DIAGNOSIS — K31.84 GASTROPARESIS: ICD-10-CM

## 2025-01-24 DIAGNOSIS — Z90.49 HISTORY OF CHOLECYSTECTOMY: ICD-10-CM

## 2025-01-24 PROCEDURE — 77063 BREAST TOMOSYNTHESIS BI: CPT

## 2025-01-24 PROCEDURE — 99214 OFFICE O/P EST MOD 30 MIN: CPT | Performed by: PHYSICIAN ASSISTANT

## 2025-01-24 PROCEDURE — 77067 SCR MAMMO BI INCL CAD: CPT

## 2025-01-24 RX ORDER — OMEPRAZOLE 40 MG/1
40 CAPSULE, DELAYED RELEASE ORAL
Qty: 90 CAPSULE | Refills: 2 | Status: SHIPPED | OUTPATIENT
Start: 2025-01-24

## 2025-01-24 NOTE — ASSESSMENT & PLAN NOTE
As above.  Orders:    omeprazole (PriLOSEC) 40 MG capsule; Take 1 capsule (40 mg total) by mouth daily before breakfast

## 2025-01-24 NOTE — TELEPHONE ENCOUNTER
Telephone Call    [] Called Patient regarding [employer] [facility]; Patient enrolled to [employer] [facility].    [] Called Patient regarding [employer] [facility]; Patient answered call and wants to enroll; Asked for a call back.    [] Called Patient regarding [employer] [facility]; Patient answered call; Declined to enroll in program.    [x] Called Patient regarding [employer] [facility]; Patient did not ; Left voicemail with my name and direct phone number.     [] Called Patient regarding [employer] [facility]; Patient did not ; Unable to leave voicemail.    [] Called Patient regarding [employer] [facility]; Phone number is invalid    Attempts (Max 3): [x]1   []2   []3        Additional Notes:

## 2025-01-24 NOTE — ASSESSMENT & PLAN NOTE
Stable and well-controlled on PPI twice daily and H2 blocker at bedtime.  We reviewed her recent EGD results and biopsy findings.  Thankfully, she does not have any new complaints or alarming symptoms.  At this time I recommended patient continue to follow GERD/gastroparesis diet and lifestyle and we discussed the importance of eating small, frequent meals throughout the day and avoiding trigger foods.  Given patient feeling significantly better, discussed can trial decrease of omeprazole to just once daily 30 minutes before breakfast in the morning.  She will try this.  I recommend she continue with famotidine 40 mg once daily at bedtime for now.  Orders:    omeprazole (PriLOSEC) 40 MG capsule; Take 1 capsule (40 mg total) by mouth daily before breakfast

## 2025-02-21 ENCOUNTER — CLINICAL SUPPORT (OUTPATIENT)
Dept: OBGYN CLINIC | Facility: CLINIC | Age: 50
End: 2025-02-21

## 2025-02-21 DIAGNOSIS — Z30.42 ENCOUNTER FOR MANAGEMENT AND INJECTION OF DEPO-PROVERA: Primary | ICD-10-CM

## 2025-02-21 PROCEDURE — 96372 THER/PROPH/DIAG INJ SC/IM: CPT

## 2025-02-21 RX ADMIN — MEDROXYPROGESTERONE ACETATE 150 MG: 150 INJECTION, SUSPENSION INTRAMUSCULAR at 10:41

## 2025-02-21 NOTE — PROGRESS NOTES
Depo-Provera     [x]   Patient provided box yes   3 Refills remain  Refills submitted no  Last  Annual Date / Birth control check : 12/02/2024  Last Depo date: 12/03/2024  Side effects: no  HCG: no  Given by: ely vaughan RN   Site: Right deltoid    Calcium supplement daily teaching  Condoms for 2 weeks following first injection dose.

## 2025-03-27 ENCOUNTER — TELEPHONE (OUTPATIENT)
Facility: HOSPITAL | Age: 50
End: 2025-03-27

## 2025-03-28 ENCOUNTER — TELEPHONE (OUTPATIENT)
Facility: HOSPITAL | Age: 50
End: 2025-03-28

## 2025-03-28 NOTE — TELEPHONE ENCOUNTER
Telephone Call    [] Called Patient regarding [employer] [facility]; Patient enrolled to [employer] [facility].    [] Called Patient regarding [employer] [facility]; Patient answered call and wants to enroll; Asked for a call back.    [] Called Patient regarding [employer] [facility]; Patient answered call; Declined to enroll in program.    [x] Called Patient regarding [employer] [facility]; Patient did not ; Left voicemail with my name and direct phone number.     [] Called Patient regarding [employer] [facility]; Patient did not ; Unable to leave voicemail.    [] Called Patient regarding [employer] [facility]; Phone number is invalid    Attempts (Max 3): []1   []2   [x]3        Additional Notes: Spoke to patient, was placed on hold, call got disconnected. Attempted to contact patient, left voice message. Enrolled patient in [employer], but eligibility  needs to be verified.

## 2025-04-18 ENCOUNTER — OFFICE VISIT (OUTPATIENT)
Dept: INTERNAL MEDICINE CLINIC | Facility: CLINIC | Age: 50
End: 2025-04-18

## 2025-04-18 ENCOUNTER — APPOINTMENT (OUTPATIENT)
Dept: LAB | Facility: CLINIC | Age: 50
End: 2025-04-18

## 2025-04-18 VITALS
HEART RATE: 87 BPM | BODY MASS INDEX: 29.39 KG/M2 | OXYGEN SATURATION: 98 % | WEIGHT: 145.5 LBS | SYSTOLIC BLOOD PRESSURE: 128 MMHG | DIASTOLIC BLOOD PRESSURE: 77 MMHG | TEMPERATURE: 98 F

## 2025-04-18 DIAGNOSIS — K21.9 GASTROESOPHAGEAL REFLUX DISEASE WITHOUT ESOPHAGITIS: ICD-10-CM

## 2025-04-18 DIAGNOSIS — R53.83 OTHER FATIGUE: ICD-10-CM

## 2025-04-18 DIAGNOSIS — E55.9 VITAMIN D DEFICIENCY: ICD-10-CM

## 2025-04-18 DIAGNOSIS — T78.2XXD ANAPHYLAXIS, SUBSEQUENT ENCOUNTER: ICD-10-CM

## 2025-04-18 DIAGNOSIS — T78.2XXA ANAPHYLAXIS, INITIAL ENCOUNTER: ICD-10-CM

## 2025-04-18 DIAGNOSIS — Z91.030 ALLERGIC TO BEES: ICD-10-CM

## 2025-04-18 DIAGNOSIS — B35.1 TINEA UNGUIUM: Primary | ICD-10-CM

## 2025-04-18 DIAGNOSIS — F32.1 MODERATE MAJOR DEPRESSION, SINGLE EPISODE (HCC): ICD-10-CM

## 2025-04-18 DIAGNOSIS — E78.49 OTHER HYPERLIPIDEMIA: ICD-10-CM

## 2025-04-18 LAB
25(OH)D3 SERPL-MCNC: 13.8 NG/ML (ref 30–100)
ALBUMIN SERPL BCG-MCNC: 4.2 G/DL (ref 3.5–5)
ALP SERPL-CCNC: 86 U/L (ref 34–104)
ALT SERPL W P-5'-P-CCNC: 15 U/L (ref 7–52)
ANION GAP SERPL CALCULATED.3IONS-SCNC: 7 MMOL/L (ref 4–13)
AST SERPL W P-5'-P-CCNC: 15 U/L (ref 13–39)
BASOPHILS # BLD AUTO: 0.03 THOUSANDS/ÂΜL (ref 0–0.1)
BASOPHILS NFR BLD AUTO: 0 % (ref 0–1)
BILIRUB SERPL-MCNC: 0.33 MG/DL (ref 0.2–1)
BUN SERPL-MCNC: 14 MG/DL (ref 5–25)
CALCIUM SERPL-MCNC: 9.4 MG/DL (ref 8.4–10.2)
CHLORIDE SERPL-SCNC: 106 MMOL/L (ref 96–108)
CHOLEST SERPL-MCNC: 155 MG/DL (ref ?–200)
CO2 SERPL-SCNC: 27 MMOL/L (ref 21–32)
CREAT SERPL-MCNC: 0.6 MG/DL (ref 0.6–1.3)
EOSINOPHIL # BLD AUTO: 0.04 THOUSAND/ÂΜL (ref 0–0.61)
EOSINOPHIL NFR BLD AUTO: 1 % (ref 0–6)
ERYTHROCYTE [DISTWIDTH] IN BLOOD BY AUTOMATED COUNT: 12.6 % (ref 11.6–15.1)
GFR SERPL CREATININE-BSD FRML MDRD: 108 ML/MIN/1.73SQ M
GLUCOSE P FAST SERPL-MCNC: 95 MG/DL (ref 65–99)
HCT VFR BLD AUTO: 39.6 % (ref 34.8–46.1)
HDLC SERPL-MCNC: 43 MG/DL
HGB BLD-MCNC: 13.4 G/DL (ref 11.5–15.4)
IMM GRANULOCYTES # BLD AUTO: 0.03 THOUSAND/UL (ref 0–0.2)
IMM GRANULOCYTES NFR BLD AUTO: 0 % (ref 0–2)
LDLC SERPL CALC-MCNC: 85 MG/DL (ref 0–100)
LYMPHOCYTES # BLD AUTO: 2.94 THOUSANDS/ÂΜL (ref 0.6–4.47)
LYMPHOCYTES NFR BLD AUTO: 35 % (ref 14–44)
MCH RBC QN AUTO: 31.2 PG (ref 26.8–34.3)
MCHC RBC AUTO-ENTMCNC: 33.8 G/DL (ref 31.4–37.4)
MCV RBC AUTO: 92 FL (ref 82–98)
MONOCYTES # BLD AUTO: 0.53 THOUSAND/ÂΜL (ref 0.17–1.22)
MONOCYTES NFR BLD AUTO: 6 % (ref 4–12)
NEUTROPHILS # BLD AUTO: 4.73 THOUSANDS/ÂΜL (ref 1.85–7.62)
NEUTS SEG NFR BLD AUTO: 58 % (ref 43–75)
NONHDLC SERPL-MCNC: 112 MG/DL
NRBC BLD AUTO-RTO: 0 /100 WBCS
PLATELET # BLD AUTO: 269 THOUSANDS/UL (ref 149–390)
PMV BLD AUTO: 11 FL (ref 8.9–12.7)
POTASSIUM SERPL-SCNC: 3.8 MMOL/L (ref 3.5–5.3)
PROT SERPL-MCNC: 7 G/DL (ref 6.4–8.4)
RBC # BLD AUTO: 4.29 MILLION/UL (ref 3.81–5.12)
SODIUM SERPL-SCNC: 140 MMOL/L (ref 135–147)
TRIGL SERPL-MCNC: 134 MG/DL (ref ?–150)
TSH SERPL DL<=0.05 MIU/L-ACNC: 1.13 UIU/ML (ref 0.45–4.5)
WBC # BLD AUTO: 8.3 THOUSAND/UL (ref 4.31–10.16)

## 2025-04-18 PROCEDURE — 85025 COMPLETE CBC W/AUTO DIFF WBC: CPT

## 2025-04-18 PROCEDURE — 80061 LIPID PANEL: CPT

## 2025-04-18 PROCEDURE — 82306 VITAMIN D 25 HYDROXY: CPT

## 2025-04-18 PROCEDURE — 80053 COMPREHEN METABOLIC PANEL: CPT

## 2025-04-18 PROCEDURE — 99214 OFFICE O/P EST MOD 30 MIN: CPT | Performed by: FAMILY MEDICINE

## 2025-04-18 PROCEDURE — 84443 ASSAY THYROID STIM HORMONE: CPT

## 2025-04-18 PROCEDURE — 36415 COLL VENOUS BLD VENIPUNCTURE: CPT

## 2025-04-18 RX ORDER — EPINEPHRINE 0.3 MG/.3ML
0.3 INJECTION SUBCUTANEOUS ONCE AS NEEDED
Qty: 0.6 ML | Refills: 0 | Status: SHIPPED | OUTPATIENT
Start: 2025-04-18

## 2025-04-18 RX ORDER — ESCITALOPRAM OXALATE 10 MG/1
10 TABLET ORAL DAILY
Qty: 90 TABLET | Refills: 3 | Status: SHIPPED | OUTPATIENT
Start: 2025-04-18

## 2025-04-18 NOTE — ASSESSMENT & PLAN NOTE
Patient has done well with  Formula # topical Tinactin in the past ,script sent to pharmacy , she will apply daily x 1 month , repeat process   Orders:    tolnaftate (TINACTIN) 1 % external solution; Apply topically daily at bedtime

## 2025-04-18 NOTE — ASSESSMENT & PLAN NOTE
Orders:    EPINEPHrine (EPIPEN) 0.3 mg/0.3 mL SOAJ; Inject 0.3 mL (0.3 mg total) into a muscle once as needed for anaphylaxis for up to 1 dose
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
## 2025-04-18 NOTE — ASSESSMENT & PLAN NOTE
Patient has not alan taking OTC D post completion of prescription course , check labs   Orders:    Vitamin D 25 hydroxy; Future

## 2025-04-18 NOTE — ASSESSMENT & PLAN NOTE
Please avoid offending foods and late night eating ( 2-3 hours before bedtime ) Take prescribed medications as directed and keep your follow up appointment

## 2025-04-23 ENCOUNTER — RESULTS FOLLOW-UP (OUTPATIENT)
Dept: INTERNAL MEDICINE CLINIC | Facility: CLINIC | Age: 50
End: 2025-04-23

## 2025-04-23 DIAGNOSIS — E55.9 VITAMIN D DEFICIENCY: Primary | ICD-10-CM

## 2025-05-09 ENCOUNTER — CLINICAL SUPPORT (OUTPATIENT)
Dept: OBGYN CLINIC | Facility: CLINIC | Age: 50
End: 2025-05-09

## 2025-05-09 DIAGNOSIS — Z30.42 ENCOUNTER FOR MANAGEMENT AND INJECTION OF DEPO-PROVERA: Primary | ICD-10-CM

## 2025-05-09 PROCEDURE — 96372 THER/PROPH/DIAG INJ SC/IM: CPT

## 2025-05-09 RX ADMIN — MEDROXYPROGESTERONE ACETATE 150 MG: 150 INJECTION, SUSPENSION INTRAMUSCULAR at 10:16

## 2025-05-09 NOTE — PROGRESS NOTES
Depo-Provera     [x]   Patient provided box yes   2 Refills remain  Refills submitted no  Last  Annual Date / Birth control check : 12/2/24  Last Depo date: 2/21/25  Side effects: no  HCG: no  Given by: Jackelyn Mcfarlane  Site: Right deltoid    Calcium supplement daily teaching  Condoms for 2 weeks following first injection dose.

## 2025-07-25 ENCOUNTER — OFFICE VISIT (OUTPATIENT)
Dept: GASTROENTEROLOGY | Facility: CLINIC | Age: 50
End: 2025-07-25

## 2025-07-25 ENCOUNTER — CLINICAL SUPPORT (OUTPATIENT)
Dept: OBGYN CLINIC | Facility: CLINIC | Age: 50
End: 2025-07-25

## 2025-07-25 VITALS
DIASTOLIC BLOOD PRESSURE: 70 MMHG | HEIGHT: 59 IN | WEIGHT: 150 LBS | TEMPERATURE: 98.9 F | SYSTOLIC BLOOD PRESSURE: 110 MMHG | BODY MASS INDEX: 30.24 KG/M2

## 2025-07-25 DIAGNOSIS — K31.84 GASTROPARESIS: ICD-10-CM

## 2025-07-25 DIAGNOSIS — K90.89 BILE SALT-INDUCED DIARRHEA: Primary | ICD-10-CM

## 2025-07-25 DIAGNOSIS — K21.9 GASTROESOPHAGEAL REFLUX DISEASE WITHOUT ESOPHAGITIS: ICD-10-CM

## 2025-07-25 DIAGNOSIS — Z30.42 ENCOUNTER FOR MANAGEMENT AND INJECTION OF DEPO-PROVERA: Primary | ICD-10-CM

## 2025-07-25 DIAGNOSIS — Z90.49 HISTORY OF CHOLECYSTECTOMY: ICD-10-CM

## 2025-07-25 PROBLEM — K63.8219 SMALL INTESTINAL BACTERIAL OVERGROWTH (SIBO): Status: RESOLVED | Noted: 2023-11-17 | Resolved: 2025-07-25

## 2025-07-25 PROCEDURE — 96372 THER/PROPH/DIAG INJ SC/IM: CPT

## 2025-07-25 RX ADMIN — MEDROXYPROGESTERONE ACETATE 150 MG: 150 INJECTION, SUSPENSION INTRAMUSCULAR at 10:23

## 2025-07-25 NOTE — ASSESSMENT & PLAN NOTE
Symptoms are stable and well-controlled with omeprazole 40 mg once daily before breakfast and famotidine 40 mg once daily at bedtime.  She will continue with this medication regimen for now.  We also discussed the importance of following a GERD/gastritis diet and lifestyle with avoidance of trigger foods like fatty, greasy, spicy foods, excess caffeine, chocolate, alcohol, citrus foods, tomato sauces.  I also recommended patient avoid overeating and avoid eating close to bedtime.

## 2025-07-25 NOTE — PROGRESS NOTES
Name: Jayshree Alcantara      : 1975      MRN: [id number]  Encounter Provider: Karina Hinojosa PA-C  Encounter Date: 2025   Encounter department: [facility] [place]  :  Assessment & Plan  Bile salt-induced diarrhea  Stable and doing well on Colestid tablets twice daily. She is so pleased. Will plan to continue with Colestid tablets twice daily for now. Patient understands to take this medication a few hours apart from other medications. No plans for further workup at this time.  Today on CRC screening and thankfully does not have any alarming symptoms.  I encouraged the patient to increase water intake with goal of drinking at least 64 ounces of water a day.  All questions answered. Patient was appreciative of care.        History of cholecystectomy  As above.        Gastroesophageal reflux disease without esophagitis  Symptoms are stable and well-controlled with omeprazole 40 mg once daily before breakfast and famotidine 40 mg once daily at bedtime.  She will continue with this medication regimen for now.  We also discussed the importance of following a GERD/gastritis diet and lifestyle with avoidance of trigger foods like fatty, greasy, spicy foods, excess caffeine, chocolate, alcohol, citrus foods, tomato sauces.  I also recommended patient avoid overeating and avoid eating close to bedtime.       Gastroparesis  As above.  Reviewed gastroparesis diet and lifestyle.           Patient was instructed to call the office with any questions, concerns, new/ worsening/ persisting GI symptoms. Advised patient go to the ER with any severe or worsening abdominal pain, fevers/ chills, intractable N/V, chest pain, SOB, dizziness, lightheadedness, feeling something stuck in esophagus that will not go down. Patient expressed understanding and is in agreement with treatment plan.     Will plan to follow up in 1 year       History of Present Illness   HPI  Jayshree Alcantara is a  50 y.o. female who presents to the office today for follow-up.  Patient was last seen in the office by me 1/24/2025, previous office note was reviewed.    Patient presents with her grand baby today and her son.   Patient reports feeling well. No new GI complaints or concerns today.   Since last office visit patient is taking omeprazole 40 mg once daily before breakfast and famotidine 40 mg once daily at bedtime.  She is so pleased.  BM's are regular and stool is brown and formed on Colestid twice daily.  She admits to poor water intake and occasional constipation   Patient has gained 7 pounds since last office visit.  Patient denies any current or recent fevers/ chills, abdominal pain, nausea/ vomiting, change in bowel habits,  black or bloody stools, heartburn, acid reflux, dysphagia, odynophagia.   Denies chest pain, SOB    Additional Results Reviewed:  Patient underwent nuclear medicine gastric emptying scan 6/27/2023, this was reviewed, this showed a delayed rate of gastric emptying consistent with gastroparesis.  Patient underwent ultrasound pelvis complete with transvaginal 3/6/2023, this showed a uterine fibroid, otherwise grossly unremarkable.  Patient underwent breath test which was positive for both SIBO and IMO and she completed a 14-day course of Xifaxan and neomycin in the past  Patient underwent CT abdomen and pelvis with IV contrast for abdominal pain and nausea 9/29/2024, this was reviewed, this showed no acute abnormality status post cholecystectomy.  EGD and colonoscopy were completed 11/8/2024, these reports were reviewed.  EGD revealed mild erythematous mucosa in the stomach consistent with gastritis, otherwise was normal.  No biopsy was negative for celiac disease.  Gastric biopsy was negative for H. pylori.  Colonoscopy was completely normal including a normal terminal ileum.  Small hemorrhoids were noted.  Random colon biopsies were normal and negative for microscopic colitis.  It was  "recommended patient undergo repeat screening colonoscopy in 10 years, November 2034.  Following the colonoscopy was recommended patient be started on colestipol tablets for bile salt induced diarrhea.    Review of Systems   Constitutional:  Negative for chills and fever.   HENT:  Negative for ear pain and sore throat.    Eyes:  Negative for pain and visual disturbance.   Respiratory:  Negative for cough and shortness of breath.    Cardiovascular:  Negative for chest pain and palpitations.   Gastrointestinal:  Negative for abdominal pain and vomiting.   Genitourinary:  Negative for dysuria and hematuria.   Musculoskeletal:  Negative for arthralgias and back pain.   Skin:  Negative for color change and rash.   Neurological:  Negative for seizures and syncope.   All other systems reviewed and are negative.      Objective   /70 (BP Location: Right arm, Patient Position: Sitting, Cuff Size: Adult)   Temp 98.9 °F (37.2 °C) (Tympanic)   Ht 4' 11\" (1.499 m)   Wt 68 kg (150 lb)   BMI 30.30 kg/m²      Physical Exam  Vitals reviewed.   Constitutional:       General: She is not in acute distress.     Appearance: She is not toxic-appearing.   HENT:      Head: Normocephalic and atraumatic.     Eyes:      Extraocular Movements: Extraocular movements intact.      Conjunctiva/sclera: Conjunctivae normal.       Cardiovascular:      Rate and Rhythm: Normal rate and regular rhythm.   Pulmonary:      Effort: Pulmonary effort is normal. No respiratory distress.      Breath sounds: Normal breath sounds.   Abdominal:      General: Bowel sounds are normal.      Palpations: Abdomen is soft.      Tenderness: There is no abdominal tenderness.     Musculoskeletal:         General: No swelling or tenderness.      Cervical back: Normal range of motion and neck supple.     Skin:     General: Skin is warm and dry.      Coloration: Skin is not jaundiced.     Neurological:      General: No focal deficit present.      Mental Status: She is " alert and oriented to person, place, and time. Mental status is at baseline.     Psychiatric:         Mood and Affect: Mood normal.         Behavior: Behavior normal.         Thought Content: Thought content normal.       Lab Results   Component Value Date    WBC 8.30 04/18/2025    HGB 13.4 04/18/2025    HCT 39.6 04/18/2025    MCV 92 04/18/2025     04/18/2025       Lab Results   Component Value Date     08/23/2015    SODIUM 140 04/18/2025    K 3.8 04/18/2025     04/18/2025    CO2 27 04/18/2025    ANIONGAP 8 08/23/2015    AGAP 7 04/18/2025    BUN 14 04/18/2025    CREATININE 0.60 04/18/2025    GLUC 147 (H) 09/29/2024    GLUF 95 04/18/2025    CALCIUM 9.4 04/18/2025    AST 15 04/18/2025    ALT 15 04/18/2025    ALKPHOS 86 04/18/2025    PROT 6.9 08/23/2015    TP 7.0 04/18/2025    BILITOT 0.13 (L) 08/23/2015    TBILI 0.33 04/18/2025    EGFR 108 04/18/2025

## 2025-07-25 NOTE — PATIENT INSTRUCTIONS
Small frequent meals. Try not to over- eat     Not eating close to bedtime     Increase water intake to at least 64 oz water/ day

## 2025-07-25 NOTE — PROGRESS NOTES
Depo-Provera     [x]   Patient provided box yes   1 Refills remain  Refills submitted no  Last  Annual Date / Birth control check : 12/2/24  Last Depo date: 5/9/25  Side effects: no  HCG: no  Given by: Jackelyn Mcfarlane  Site: Right deltoid    Calcium supplement daily teaching  Condoms for 2 weeks following first injection dose.

## 2025-07-25 NOTE — ASSESSMENT & PLAN NOTE
Stable and doing well on Colestid tablets twice daily. She is so pleased. Will plan to continue with Colestid tablets twice daily for now. Patient understands to take this medication a few hours apart from other medications. No plans for further workup at this time.  Today on CRC screening and thankfully does not have any alarming symptoms.  I encouraged the patient to increase water intake with goal of drinking at least 64 ounces of water a day.  All questions answered. Patient was appreciative of care.

## (undated) DEVICE — [HIGH FLOW INSUFFLATOR,  DO NOT USE IF PACKAGE IS DAMAGED,  KEEP DRY,  KEEP AWAY FROM SUNLIGHT,  PROTECT FROM HEAT AND RADIOACTIVE SOURCES.]: Brand: PNEUMOSURE

## (undated) DEVICE — GLOVE SRG BIOGEL 6.5

## (undated) DEVICE — GLOVE INDICATOR PI UNDERGLOVE SZ 6.5 BLUE

## (undated) DEVICE — ALLENTOWN LAP CHOLE APP PACK: Brand: CARDINAL HEALTH

## (undated) DEVICE — TISSUE RETRIEVAL SYSTEM: Brand: INZII RETRIEVAL SYSTEM

## (undated) DEVICE — DISPOSABLE OR TOWEL: Brand: CARDINAL HEALTH

## (undated) DEVICE — SUT MONOCRYL 4-0 PS-2 27 IN Y426H

## (undated) DEVICE — GLOVE INDICATOR PI UNDERGLOVE SZ 7.5 BLUE

## (undated) DEVICE — ELECTRODE LAP J HOOK SPLIT STEM E-Z CLEAN 33CM -0021S

## (undated) DEVICE — UNDER BUTTOCKS DRAPE W/FLUID CONTROL POUCH: Brand: CONVERTORS

## (undated) DEVICE — BLUE HEAT SCOPE WARMER

## (undated) DEVICE — DRAPE EQUIPMENT RF WAND

## (undated) DEVICE — PVC URETHRAL CATHETER: Brand: DOVER

## (undated) DEVICE — TROCAR: Brand: KII SLEEVE

## (undated) DEVICE — GLOVE PI ULTRA TOUCH SZ.7.0

## (undated) DEVICE — DRAPE LAPAROTOMY W/POUCHES

## (undated) DEVICE — CHLORAPREP HI-LITE 26ML ORANGE

## (undated) DEVICE — CYSTO TUBING SINGLE IRRIGATION

## (undated) DEVICE — SUT VICRYL 0 UR-6 27 IN J603H

## (undated) DEVICE — SCD SEQUENTIAL COMPRESSION COMFORT SLEEVE MEDIUM KNEE LENGTH: Brand: KENDALL SCD

## (undated) DEVICE — NEEDLE HYPO 22G X 1-1/2 IN

## (undated) DEVICE — TROCAR: Brand: KII FIOS FIRST ENTRY

## (undated) DEVICE — GLOVE INDICATOR PI UNDERGLOVE SZ 8 BLUE

## (undated) DEVICE — INTENDED FOR TISSUE SEPARATION, AND OTHER PROCEDURES THAT REQUIRE A SHARP SURGICAL BLADE TO PUNCTURE OR CUT.: Brand: BARD-PARKER SAFETY BLADES SIZE 11, STERILE

## (undated) DEVICE — 3000CC GUARDIAN II: Brand: GUARDIAN

## (undated) DEVICE — PMI DISPOSABLE PUNCTURE CLOSURE DEVICE / SUTURE GRASPER: Brand: PMI

## (undated) DEVICE — NOVASURE ADVANCED DEVICE

## (undated) DEVICE — TUBING SMOKE EVAC W/FILTRATION DEVICE PLUMEPORT ACTIV

## (undated) DEVICE — PLUMEPEN PRO 10FT

## (undated) DEVICE — EXOFIN PRECISION PEN HIGH VISCOSITY TOPICAL SKIN ADHESIVE: Brand: EXOFIN PRECISION PEN, 1G

## (undated) DEVICE — GLOVE SRG BIOGEL ECLIPSE 7.5

## (undated) DEVICE — METZENBAUM ADTEC SINGLE USE DISSECTING SCISSORS, SHAFT ONLY, MONOPOLAR, CURVED TO LEFT, WORKING LENGTH: 12 1/4", (310 MM), DIAM. 5 MM, INSULATED, DOUBLE ACTION, STERILE, DISPOSABLE, PACKAGE OF 10 PIECES: Brand: AESCULAP

## (undated) DEVICE — BETHLEHEM UNIVERSAL MINOR VAG: Brand: CARDINAL HEALTH

## (undated) DEVICE — LIGAMAX 5 MM ENDOSCOPIC MULTIPLE CLIP APPLIER: Brand: LIGAMAX